# Patient Record
Sex: FEMALE | Race: WHITE | NOT HISPANIC OR LATINO | Employment: PART TIME | URBAN - NONMETROPOLITAN AREA
[De-identification: names, ages, dates, MRNs, and addresses within clinical notes are randomized per-mention and may not be internally consistent; named-entity substitution may affect disease eponyms.]

---

## 2017-01-02 ENCOUNTER — HISTORY (OUTPATIENT)
Dept: INTERNAL MEDICINE | Facility: OTHER | Age: 37
End: 2017-01-02

## 2017-01-02 ENCOUNTER — OFFICE VISIT - GICH (OUTPATIENT)
Dept: INTERNAL MEDICINE | Facility: OTHER | Age: 37
End: 2017-01-02

## 2017-01-02 DIAGNOSIS — H60.502 ACUTE NONINFECTIVE OTITIS EXTERNA OF LEFT EAR: ICD-10-CM

## 2017-01-02 ASSESSMENT — ANXIETY QUESTIONNAIRES
3. WORRYING TOO MUCH ABOUT DIFFERENT THINGS: SEVERAL DAYS
7. FEELING AFRAID AS IF SOMETHING AWFUL MIGHT HAPPEN: SEVERAL DAYS
5. BEING SO RESTLESS THAT IT IS HARD TO SIT STILL: NEARLY EVERY DAY
2. NOT BEING ABLE TO STOP OR CONTROL WORRYING: SEVERAL DAYS
6. BECOMING EASILY ANNOYED OR IRRITABLE: SEVERAL DAYS
GAD7 TOTAL SCORE: 9
1. FEELING NERVOUS, ANXIOUS, OR ON EDGE: SEVERAL DAYS
4. TROUBLE RELAXING: SEVERAL DAYS

## 2017-01-02 ASSESSMENT — PATIENT HEALTH QUESTIONNAIRE - PHQ9: SUM OF ALL RESPONSES TO PHQ QUESTIONS 1-9: 8

## 2017-01-09 ENCOUNTER — COMMUNICATION - GICH (OUTPATIENT)
Dept: PEDIATRICS | Facility: OTHER | Age: 37
End: 2017-01-09

## 2017-01-09 DIAGNOSIS — F41.1 GENERALIZED ANXIETY DISORDER: ICD-10-CM

## 2017-01-09 DIAGNOSIS — F33.2 MAJOR DEPRESSIVE DISORDER, RECURRENT SEVERE WITHOUT PSYCHOTIC FEATURES (H): ICD-10-CM

## 2017-01-25 ENCOUNTER — COMMUNICATION - GICH (OUTPATIENT)
Dept: INTERNAL MEDICINE | Facility: OTHER | Age: 37
End: 2017-01-25

## 2017-01-25 DIAGNOSIS — N76.0 ACUTE VAGINITIS: ICD-10-CM

## 2017-02-10 ENCOUNTER — COMMUNICATION - GICH (OUTPATIENT)
Dept: PEDIATRICS | Facility: OTHER | Age: 37
End: 2017-02-10

## 2017-02-10 DIAGNOSIS — F41.1 GENERALIZED ANXIETY DISORDER: ICD-10-CM

## 2017-02-10 DIAGNOSIS — F33.2 MAJOR DEPRESSIVE DISORDER, RECURRENT SEVERE WITHOUT PSYCHOTIC FEATURES (H): ICD-10-CM

## 2017-09-14 ENCOUNTER — AMBULATORY - GICH (OUTPATIENT)
Dept: PEDIATRICS | Facility: OTHER | Age: 37
End: 2017-09-14

## 2017-09-19 ENCOUNTER — AMBULATORY - GICH (OUTPATIENT)
Dept: INTERNAL MEDICINE | Facility: OTHER | Age: 37
End: 2017-09-19

## 2017-09-21 ENCOUNTER — AMBULATORY - GICH (OUTPATIENT)
Dept: INTERNAL MEDICINE | Facility: OTHER | Age: 37
End: 2017-09-21

## 2017-10-31 ENCOUNTER — AMBULATORY - GICH (OUTPATIENT)
Dept: INTERNAL MEDICINE | Facility: OTHER | Age: 37
End: 2017-10-31

## 2018-01-02 NOTE — PROGRESS NOTES
"Patient Information     Patient Name MRN Sex Hodan Aguirre [id number] Female 1980      Progress Notes by Leda Medina NP at 2017  9:30 AM     Author:  Leda Medina NP Service:  (none) Author Type:  PHYS- Nurse Practitioner     Filed:  2017  9:56 AM Encounter Date:  2017 Status:  Signed     :  Leda Medina NP (PHYS- Nurse Practitioner)            SUBJECTIVE:    Hodan Iniguez is a 36 y.o. female who presents for plugged, painful left ear.    HPI  she reports that for the past 2 days her left ear has been plugged and swollen. She has been afebrile. There has been no drainage from the ear. She denies URI symptoms. No history of allergies. She does take baths daily but does not swim regularly. She reports she has taken ibuprofen for discomfort.  No Known Allergies,   Current Outpatient Prescriptions on File Prior to Visit       Medication  Sig Dispense Refill     levothyroxine (SYNTHROID) 175 mcg tablet Take 1 tablet by mouth before breakfast. 90 tablet 4     sertraline (ZOLOFT) 100 mg tablet Take 1 tablet by mouth once daily. Note dose change 90 tablet 1     traZODone (DESYREL) 50 mg tablet Take 1 tablet by mouth at bedtime if needed for Sleep. 30 tablet 1     venlafaxine (EFFEXOR XR) 75 mg cp24 Extended-Release capsule 75 mg daily x 7 days then 150 mg daily 60 capsule 1     No current facility-administered medications on file prior to visit.     and   Past Medical History     Diagnosis  Date     Anxiety      GERD (gastroesophageal reflux disease)      Hypothyroidism        REVIEW OF SYSTEMS:  ROS  see history of present illness  OBJECTIVE:  /82  Temp 97.9  F (36.6  C) (Tympanic)  Ht 1.645 m (5' 4.75\")  Wt 97.1 kg (214 lb)  LMP 2016  Breastfeeding? No  BMI 35.89 kg/m2    EXAM:   Physical Exam  pleasant female without acute distress. Affect normal. Alert and oriented ×4. Left ear canal swollen and mildly erythematous. Unable to evaluate TM. Right " TM and canal clear. Oral mucosa pink and moist. Throat without erythema. Neck supple and without adenopathy.    ASSESSMENT/PLAN:    ICD-10-CM    1. Acute otitis externa of left ear, unspecified type H60.502 neomycin-polymyxin-hydrocortisone (CORTISPORIN OTIC) otic suspension        Plan:  Treat with Cortisporin otic 4 drops left ear 3 times daily for 1 week. If symptoms not clear in the next week or if develops worsening then will need follow-up. Explained to patient that because of the swelling of the ear canal was not able to visualize the tympanic membrane. Suspect she does not have left otitis media but if symptoms are not improving she will need to have her ear rechecked.

## 2018-01-02 NOTE — NURSING NOTE
Patient Information     Patient Name MRN Sex Hodan Aguirre [id number] Female 1980      Nursing Note by Eri Nagy LPN at 2017  9:30 AM     Author:  Eri Nagy LPN Service:  (none) Author Type:  NURS- Licensed Practical Nurse     Filed:  2017  9:48 AM Encounter Date:  2017 Status:  Signed     :  Eri Nagy LPN (NURS- Licensed Practical Nurse)            Hodan Iniguez is a 36 y.o. female here today for left ear pain that has been ongoing for the past couple days.  Eri Nagy LPN.........2017   9:29 AM

## 2018-01-03 NOTE — TELEPHONE ENCOUNTER
Patient Information     Patient Name MRN Hodan Del Valle [id number] Female 1980      Telephone Encounter by Eri Nagy LPN at 2/10/2017 12:38 PM     Author:  Eri Nagy LPN Service:  (none) Author Type:  NURS- Licensed Practical Nurse     Filed:  2/10/2017 12:38 PM Encounter Date:  2/10/2017 Status:  Signed     :  Eri Nagy LPN (NURS- Licensed Practical Nurse)            Left message for patient to return call.  Eri Nagy LPN.........2/10/2017   12:38 PM

## 2018-01-03 NOTE — TELEPHONE ENCOUNTER
Patient Information     Patient Name MRN Hodan Del Valle [id number] Female 1980      Telephone Encounter by Leda Medina NP at 2017 12:33 PM     Author:  Leda Medina NP Service:  (none) Author Type:  PHYS- Nurse Practitioner     Filed:  2017 12:34 PM Encounter Date:  2017 Status:  Signed     :  Leda Medina NP (PHYS- Nurse Practitioner)            rx sent.  Let her know to be seen if doesn't get better with treatment

## 2018-01-03 NOTE — TELEPHONE ENCOUNTER
Patient Information     Patient Name MRN Hodan Del Valle [id number] Female 1980      Telephone Encounter by Nuvia Arrieta RN at 2/10/2017  8:39 AM     Author:  Nuvia Arrieta RN  Service:  (none) Author Type:  NURS- Registered Nurse     Filed:  2/10/2017  8:59 AM  Encounter Date:  2/10/2017 Status:  Addendum     :  Nuvia Arrieta RN (NURS- Registered Nurse)        Related Notes: Original Note by Nuvia Arrieta RN (NURS- Registered Nurse) filed at 2/10/2017  8:44 AM            venlafaxine (EFFEXOR XR) 75 mg cp24 Extended-Release capsule  TAKE 1 CAPSULE BY MOUTH ONCE DAILY FOR 7 DAYS THEN 2 CAPSULES(150 MG) BY MOUTH DAILY.  Disp: 60 capsule Refills: 0    Class: eRx Start: 2/10/2017    For: Severe episode of recurrent major depressive disorder, without psychotic features (HC), JUAN CARLOS (generalized anxiety disorder)  Originally ordered: 2 months ago by Sudheer Marquez MD  Last refill: 2017  To be filled at: [facility] [address] [place] [address] AT [address] - [phone]: [phone]    Last visit with SUDHEER MARQUEZ was on: 2016 in [facility]  PCP:  Leda Medina NP    New medication started on 16 and patient was to follow up in 4 weeks and no follow up on this noted.    Left message for patient to return call to schedule follow up.    Patient returned call and states she only has one day left of her medication and that the 2 capsules a day are working good for her.  Would you want to work patient in today for follow up or refill until she can get in another day.  Patients PCP in [place] and patient wants to keep her as her PCP .    Unable to complete prescription refill per RN Medication Refill Policy.................... NUVIA ARRIETA RN ....................  2/10/2017   8:40 AM

## 2018-01-03 NOTE — TELEPHONE ENCOUNTER
Patient Information     Patient Name MRN Sex Hodan Aguirre [id number] Female 1980      Telephone Encounter by Trudy Morin at 2/10/2017  9:25 AM     Author:  Trudy Morin Service:  (none) Author Type:  (none)     Filed:  2/10/2017  9:25 AM Encounter Date:  2/10/2017 Status:  Signed     :  Trudy Morin            Left message to call back.  Trudy Morin [facility] ([facility]) ....................  2/10/2017   9:25 AM

## 2018-01-03 NOTE — TELEPHONE ENCOUNTER
Patient Information     Patient Name MRN Hodan Del Valle [id number] Female 1980      Telephone Encounter by Abraham Draper RN at 2017  2:25 PM     Author:  Abraham Draper RN Service:  (none) Author Type:  NURS- Registered Nurse     Filed:  2017  2:35 PM Encounter Date:  2017 Status:  Signed     :  Abraham Draper RN (NURS- Registered Nurse)            Medication is not on refill protocol or medication list.  Unable to complete prescription refill per RN Medication Refill Policy.................... ABRAHAM DRAPER RN ....................  2017   2:25 PM  Call place to patient.  Patient states she thinks she has a yeast infection.  She is having slimy clear discharge, pain on the outside of her opening, and is chaffed.  Denies fevers, or burning with urination.  Denies abdominal pain.      This is a Refill request from: [facility]  Name of Medication: Diflucan 150mg tablet  Quantity requested: 1 tab  Last fill date: 16  Last visit with LEDA ESPINAL was on: 2017 in [facility]  PCP:  Leda Espinal NP  Controlled Substance Agreement:  na   Diagnosis r/t this medication request: unsure

## 2018-01-03 NOTE — TELEPHONE ENCOUNTER
Patient Information     Patient Name MRN Sex Hodan Aguirre [id number] Female 1980      Telephone Encounter by Trudy Morin at 2017 12:43 PM     Author:  Trudy Morin Service:  (none) Author Type:  (none)     Filed:  2017 12:43 PM Encounter Date:  2017 Status:  Signed     :  Trudy Morin            Left message to call back.   Trudy Morin [facility] ([facility]) ....................  2017   12:43 PM

## 2018-01-03 NOTE — TELEPHONE ENCOUNTER
Patient Information     Patient Name MRN Hodan Del Valle [id number] Female 1980      Telephone Encounter by Eri Nagy LPN at 2017  8:58 AM     Author:  Eri Nagy LPN Service:  (none) Author Type:  NURS- Licensed Practical Nurse     Filed:  2017  8:58 AM Encounter Date:  2/10/2017 Status:  Signed     :  Eri Nagy LPN (NURS- Licensed Practical Nurse)            Left message for patient to return call.  Eri Nagy LPN.........2017   8:58 AM

## 2018-01-03 NOTE — TELEPHONE ENCOUNTER
Patient Information     Patient Name MRN Sex Hodan Aguirre [id number] Female 1980      Telephone Encounter by Gosselin, Norma J at 2017  2:25 PM     Author:  Gosselin, Norma J Service:  (none) Author Type:  (none)     Filed:  2017  2:25 PM Encounter Date:  2017 Status:  Signed     :  Gosselin, Norma J            After patient's name and date of birth verified, patient given the below information.  Norma J Gosselin LPN....................  2017   2:25 PM

## 2018-01-08 ENCOUNTER — COMMUNICATION - GICH (OUTPATIENT)
Dept: INTERNAL MEDICINE | Facility: OTHER | Age: 38
End: 2018-01-08

## 2018-01-08 DIAGNOSIS — F33.2 MAJOR DEPRESSIVE DISORDER, RECURRENT SEVERE WITHOUT PSYCHOTIC FEATURES (H): ICD-10-CM

## 2018-01-08 DIAGNOSIS — F41.1 GENERALIZED ANXIETY DISORDER: ICD-10-CM

## 2018-01-26 VITALS
DIASTOLIC BLOOD PRESSURE: 82 MMHG | WEIGHT: 214 LBS | BODY MASS INDEX: 35.65 KG/M2 | HEIGHT: 65 IN | TEMPERATURE: 97.9 F | SYSTOLIC BLOOD PRESSURE: 120 MMHG

## 2018-02-01 ASSESSMENT — ANXIETY QUESTIONNAIRES: GAD7 TOTAL SCORE: 9

## 2018-02-01 ASSESSMENT — PATIENT HEALTH QUESTIONNAIRE - PHQ9: SUM OF ALL RESPONSES TO PHQ QUESTIONS 1-9: 8

## 2018-02-12 NOTE — TELEPHONE ENCOUNTER
Patient Information     Patient Name MRN Hodan Del Valle [id number] Female 1980      Telephone Encounter by Zina Pryor RN at 1/10/2018 10:34 AM     Author:  Zina Pryor RN Service:  (none) Author Type:  NURS- Registered Nurse     Filed:  1/10/2018 10:39 AM Encounter Date:  2018 Status:  Signed     :  Zina Pryor RN (NURS- Registered Nurse)            Depression-in adults 18 and over  Serotonin/Norepinephrine Reuptake Inhibitors    Office visit in the past 12 months or as indicated in chart.  Should have clinic visit 1-2 months after initial prescription.    Last visit with SAM ESPINAL was on: 2017 in [facility]  Next visit with SAM ESPINAL is on: No future appointment listed with this provider  Next visit with Internal Medicine is on: No future appointment listed in this department    Max refills 12 months from last office visit or per providers notes. Letter sent to remind patient she is due for annual med management appointment. 90 day supply given at this time.   Prescription refilled per RN Medication Refill Policy.................... Zina Pryor RN ....................  1/10/2018   10:35 AM

## 2018-02-16 ENCOUNTER — DOCUMENTATION ONLY (OUTPATIENT)
Dept: FAMILY MEDICINE | Facility: OTHER | Age: 38
End: 2018-02-16

## 2018-02-16 PROBLEM — K21.9 ESOPHAGEAL REFLUX: Status: ACTIVE | Noted: 2018-02-16

## 2018-02-16 PROBLEM — N76.0 ACUTE VAGINITIS: Status: ACTIVE | Noted: 2017-01-27

## 2018-02-16 RX ORDER — SERTRALINE HYDROCHLORIDE 100 MG/1
100 TABLET, FILM COATED ORAL DAILY
COMMUNITY
Start: 2016-03-31 | End: 2018-06-19

## 2018-02-16 RX ORDER — LEVOTHYROXINE SODIUM 175 UG/1
175 TABLET ORAL
COMMUNITY
Start: 2016-12-13 | End: 2018-02-22

## 2018-02-16 RX ORDER — VENLAFAXINE HYDROCHLORIDE 150 MG/1
1 CAPSULE, EXTENDED RELEASE ORAL
COMMUNITY
Start: 2018-01-10 | End: 2018-04-30

## 2018-02-16 RX ORDER — TRAZODONE HYDROCHLORIDE 50 MG/1
50 TABLET, FILM COATED ORAL
COMMUNITY
Start: 2016-12-12 | End: 2018-06-19

## 2018-02-22 DIAGNOSIS — E03.9 ACQUIRED HYPOTHYROIDISM: Primary | ICD-10-CM

## 2018-02-22 NOTE — LETTER
February 27, 2018    Hodan Iniguez  [address]  [place] [address]    Dear Miss Iniguez,      A refill of LEVOTHYROXINE has been requested by your pharmacy and we noticed that you are overdue for an annual exam and labs.  Your last comprehensive visit with Sudheer Marquez MD was on 12/12/2016.    This refill request has been sent to your provider for consideration at this time.    Your health is very important to us.  Please call the clinic at [phone] to schedule your appointment.    If you are no longer seeing Leda Medina NP for primary care, please call to let us know. Doing so will remove you from our call/contact list.    Thank you for choosing [facility] for your health care needs.    Sincerely,    Reftad BECERRA  [facility]

## 2018-02-27 RX ORDER — LEVOTHYROXINE SODIUM 175 UG/1
TABLET ORAL
Qty: 90 TABLET | Refills: 3 | Status: SHIPPED | OUTPATIENT
Start: 2018-02-27 | End: 2018-06-20

## 2018-02-27 NOTE — TELEPHONE ENCOUNTER
"Refill requested from [facility] for:  levothyroxine (SYNTHROID/LEVOTHROID) 175 MCG tablet.    LOV with prescribing- Sudheer Marquez MD:  12/12/2016  Patient was to follow-up in 4 weeks.  Was seen on 1/2/2017 by Leda Medina NP for ear problem.  LOV was with Leda Medina, NP:  1/2/2017  Last TSH 12/12/2016.    No upcoming appointments noted at this time.      Attempted to contact patient-no answer and no message left at this time.      Letter has been sent and medication teed up for 90 days for consideration.    Medication:levothyroxine (SYNTHROID) 175 mcg tablet    Qty:90 tablet   Ref:4  Start:12/13/2016  End:              Route:Oral                  NARESH:No   Class:eRx    Sig:Take 1 tablet by mouth before breakfast.    Note to Pharmacy:Dose increase    [facility] [address] [place] [address] [address]  AT [address]              OF [facility] [address] [facility] [phone]    Requested Prescriptions   Pending Prescriptions Disp Refills     levothyroxine (SYNTHROID/LEVOTHROID) 175 MCG tablet [Pharmacy Med Name: LEVOTHYROXINE 0.175MG (175MCG)TABS] 90 tablet 0     Sig: TAKE 1 TABLET BY MOUTH BEFORE BREAKFAST    Thyroid Protocol Failed    2/27/2018 10:18 AM       Failed - Recent or future visit with authorizing provider's specialty    Patient had office visit in the last year or has a visit in the next 30 days with authorizing provider.  See \"Patient Info\" tab in inbasket, or \"Choose Columns\" in Meds & Orders section of the refill encounter.            Failed - Normal TSH on file in past 12 months    No lab results found.          Failed - No positive pregnancy test in past 12 months    If patient is pregnant or has had a positive pregnancy test, please check TSH.         Passed - Patient is 12 years or older       Passed - No active pregnancy on record    If patient is pregnant or has had a positive pregnancy test, please check TSH.          Nicolle Oscar RN  " ....................  2/27/2018   10:56 AM

## 2018-02-27 NOTE — TELEPHONE ENCOUNTER
TSH lab ordered/signed for future.    Nicolle Oscar RN  ....................  2/27/2018   11:14 AM

## 2018-04-30 DIAGNOSIS — F41.1 GAD (GENERALIZED ANXIETY DISORDER): ICD-10-CM

## 2018-04-30 DIAGNOSIS — F33.9 RECURRENT MAJOR DEPRESSIVE DISORDER, REMISSION STATUS UNSPECIFIED (H): Primary | ICD-10-CM

## 2018-05-02 RX ORDER — VENLAFAXINE HYDROCHLORIDE 150 MG/1
CAPSULE, EXTENDED RELEASE ORAL
Qty: 90 CAPSULE | Refills: 3 | Status: SHIPPED | OUTPATIENT
Start: 2018-05-02 | End: 2018-06-19

## 2018-06-19 ENCOUNTER — OFFICE VISIT (OUTPATIENT)
Dept: INTERNAL MEDICINE | Facility: OTHER | Age: 38
End: 2018-06-19
Attending: NURSE PRACTITIONER
Payer: COMMERCIAL

## 2018-06-19 ENCOUNTER — MYC MEDICAL ADVICE (OUTPATIENT)
Dept: INTERNAL MEDICINE | Facility: OTHER | Age: 38
End: 2018-06-19

## 2018-06-19 VITALS
DIASTOLIC BLOOD PRESSURE: 82 MMHG | HEART RATE: 88 BPM | BODY MASS INDEX: 34.42 KG/M2 | TEMPERATURE: 97.4 F | WEIGHT: 206.6 LBS | SYSTOLIC BLOOD PRESSURE: 128 MMHG | HEIGHT: 65 IN

## 2018-06-19 DIAGNOSIS — N97.9 INABILITY TO CONCEIVE, FEMALE: ICD-10-CM

## 2018-06-19 DIAGNOSIS — Z83.3 FAMILY HISTORY OF DIABETES MELLITUS: ICD-10-CM

## 2018-06-19 DIAGNOSIS — E03.9 HYPOTHYROIDISM, UNSPECIFIED TYPE: Primary | ICD-10-CM

## 2018-06-19 DIAGNOSIS — F33.9 RECURRENT MAJOR DEPRESSIVE DISORDER, REMISSION STATUS UNSPECIFIED (H): ICD-10-CM

## 2018-06-19 PROBLEM — N76.0 ACUTE VAGINITIS: Status: RESOLVED | Noted: 2017-01-27 | Resolved: 2018-06-19

## 2018-06-19 PROBLEM — K21.9 ESOPHAGEAL REFLUX: Status: RESOLVED | Noted: 2018-02-16 | Resolved: 2018-06-19

## 2018-06-19 LAB
GLUCOSE SERPL-MCNC: 95 MG/DL (ref 70–105)
T4 FREE SERPL-MCNC: 1.37 NG/DL (ref 0.6–1.6)
TSH SERPL DL<=0.05 MIU/L-ACNC: <0.2 IU/ML (ref 0.34–5.6)

## 2018-06-19 PROCEDURE — 99215 OFFICE O/P EST HI 40 MIN: CPT | Performed by: NURSE PRACTITIONER

## 2018-06-19 PROCEDURE — 84443 ASSAY THYROID STIM HORMONE: CPT | Performed by: NURSE PRACTITIONER

## 2018-06-19 PROCEDURE — 84439 ASSAY OF FREE THYROXINE: CPT | Performed by: NURSE PRACTITIONER

## 2018-06-19 PROCEDURE — 82947 ASSAY GLUCOSE BLOOD QUANT: CPT | Performed by: NURSE PRACTITIONER

## 2018-06-19 PROCEDURE — 36415 COLL VENOUS BLD VENIPUNCTURE: CPT | Performed by: NURSE PRACTITIONER

## 2018-06-19 RX ORDER — VENLAFAXINE HYDROCHLORIDE 75 MG/1
225 CAPSULE, EXTENDED RELEASE ORAL DAILY
Qty: 270 CAPSULE | Refills: 1 | Status: SHIPPED | OUTPATIENT
Start: 2018-06-19 | End: 2018-07-05

## 2018-06-19 ASSESSMENT — ANXIETY QUESTIONNAIRES
2. NOT BEING ABLE TO STOP OR CONTROL WORRYING: MORE THAN HALF THE DAYS
6. BECOMING EASILY ANNOYED OR IRRITABLE: MORE THAN HALF THE DAYS
7. FEELING AFRAID AS IF SOMETHING AWFUL MIGHT HAPPEN: SEVERAL DAYS
5. BEING SO RESTLESS THAT IT IS HARD TO SIT STILL: SEVERAL DAYS
IF YOU CHECKED OFF ANY PROBLEMS ON THIS QUESTIONNAIRE, HOW DIFFICULT HAVE THESE PROBLEMS MADE IT FOR YOU TO DO YOUR WORK, TAKE CARE OF THINGS AT HOME, OR GET ALONG WITH OTHER PEOPLE: SOMEWHAT DIFFICULT
1. FEELING NERVOUS, ANXIOUS, OR ON EDGE: MORE THAN HALF THE DAYS
GAD7 TOTAL SCORE: 11
3. WORRYING TOO MUCH ABOUT DIFFERENT THINGS: MORE THAN HALF THE DAYS

## 2018-06-19 ASSESSMENT — ENCOUNTER SYMPTOMS
CHILLS: 0
NERVOUS/ANXIOUS: 0
SORE THROAT: 0
FREQUENCY: 0
HEARTBURN: 0
DYSPHORIC MOOD: 1
AGITATION: 0
DIARRHEA: 0
FEVER: 0
COUGH: 0
EYE DISCHARGE: 0
POLYDIPSIA: 0
ABDOMINAL PAIN: 0
UNEXPECTED WEIGHT CHANGE: 0
HALLUCINATIONS: 0
SHORTNESS OF BREATH: 0
WEAKNESS: 0
WOUND: 0
NAUSEA: 0
PALPITATIONS: 0
VOMITING: 0
DYSURIA: 0
PARESTHESIAS: 0
CONSTIPATION: 0
ADENOPATHY: 0
JOINT SWELLING: 0
SLEEP DISTURBANCE: 0
HEMATOCHEZIA: 0
NUMBNESS: 0
DIZZINESS: 0
CONFUSION: 0
MYALGIAS: 0
POLYPHAGIA: 0
WHEEZING: 0
EYE REDNESS: 0
ARTHRALGIAS: 0
HEADACHES: 0

## 2018-06-19 ASSESSMENT — PAIN SCALES - GENERAL: PAINLEVEL: NO PAIN (0)

## 2018-06-19 ASSESSMENT — PATIENT HEALTH QUESTIONNAIRE - PHQ9: 5. POOR APPETITE OR OVEREATING: SEVERAL DAYS

## 2018-06-19 NOTE — PATIENT INSTRUCTIONS
Schedule appointment with Mireya Johnson or other mental health practitioner.  You should also see a mental health counselor.  Call 911 or First call for help with any suicidal ideation.  You will be referred to gynecology for infertility.  I would like you to be stable with mental health issues before conceiving.  Increase the Effexor XR to 225 mg daily and discuss with gynecology and mental health counselor safest medication for depression if you are to get pregnant.  You will need a pap which can be done at the gynecology appointment.  We will check labs for thyroid and glucose today and will adjust medication as needed.

## 2018-06-19 NOTE — MR AVS SNAPSHOT
After Visit Summary   6/19/2018    Hodan Iniguez    MRN: [id number]           Patient Information     Date Of Birth          1980        Visit Information        Provider Department      6/19/2018 9:00 AM Leda Medina NP [facility]        Today's Diagnoses     Hypothyroidism, unspecified type    -  1    Recurrent major depressive disorder, remission status unspecified (H)        Family history of diabetes mellitus        Inability to conceive, female          Care Instructions    Schedule appointment with Mireya Johnson or other mental health practitioner.  You should also see a mental health counselor.  Call 911 or First call for help with any suicidal ideation.  You will be referred to gynecology for infertility.  I would like you to be stable with mental health issues before conceiving.  Increase the Effexor XR to 225 mg daily and discuss with gynecology and mental health counselor safest medication for depression if you are to get pregnant.  You will need a pap which can be done at the gynecology appointment.  We will check labs for thyroid and glucose today and will adjust medication as needed.            Follow-ups after your visit        Additional Services     OB/GYN REFERRAL       Your provider has referred you to:  [facility]: [facility] [phone]   [email or web address]    Please be aware that coverage of these services is subject to the terms and limitations of your health insurance plan.  Call member services at your health plan with any benefit or coverage questions.      Please bring the following with you to your appointment:    (1) Any X-Rays, CTs or MRIs which have been performed.  Contact the facility where they were done to arrange for  prior to your scheduled appointment.   (2) List of current medications   (3) This referral request   (4) Any documents/labs given to you for this referral       "            Who to contact     If you have questions or need follow up information about today's clinic visit or your schedule please contact [facility] directly at [phone].  Normal or non-critical lab and imaging results will be communicated to you by [employer], letter or phone within 4 business days after the clinic has received the results. If you do not hear from us within 7 days, please contact the clinic through [employer] or phone. If you have a critical or abnormal lab result, we will notify you by phone as soon as possible.  Submit refill requests through [employer] or call your pharmacy and they will forward the refill request to us. Please allow 3 business days for your refill to be completed.          Additional Information About Your Visit        [employer] Information     [employer] gives you secure access to your electronic health record. If you see a primary care provider, you can also send messages to your care team and make appointments. If you have questions, please call your primary care clinic.  If you do not have a primary care provider, please call [phone] and they will assist you.        Care EveryWhere ID     This is your Care EveryWhere ID. This could be used by other organizations to access your [facility] medical records  [id number]        Your Vitals Were     Pulse Temperature Height Last Period Breastfeeding? BMI (Body Mass Index)    88 97.4  F (36.3  C) (Tympanic) 1.651 m (5' 5\") 05/18/2018 No 34.38 kg/m2       Blood Pressure from Last 3 Encounters:   06/19/18 128/82   01/02/17 120/82   12/12/16 130/86    Weight from Last 3 Encounters:   06/19/18 93.7 kg (206 lb 9.6 oz)   01/02/17 97.1 kg (214 lb)   12/12/16 98.6 kg (217 lb 5 oz)              We Performed the Following     Glucose     OB/GYN REFERRAL     T4 free     Thyrotropin GH          Today's Medication Changes          These changes are accurate as of 6/19/18  9:47 AM.  If you have any questions, ask your nurse " or doctor.               These medicines have changed or have updated prescriptions.        Dose/Directions    venlafaxine 75 MG 24 hr capsule   Commonly known as:  EFFEXOR-XR   This may have changed:    - medication strength  - See the new instructions.   Used for:  Recurrent major depressive disorder, remission status unspecified (H)   Changed by:  Leda Medina NP        Dose:  225 mg   Take 3 capsules (225 mg) by mouth daily   Quantity:  270 capsule   Refills:  1            Where to get your medicines      These medications were sent to [id number] - [place], [place] - [address]  [address], [facility] [address]     Phone:  [phone]     venlafaxine 75 MG 24 hr capsule                Primary Care Provider Office Phone # Fax #    Leda Medina [phone]       [address]  [facility] [address]        Equal Access to Services     [facility]: Hadii gerry ku hadasho Soomaali, waaxda luqadaha, qaybta kaalmada adeegyada, bryce stevens . So [facility] [phone].    ATENCIÓN: Si masonla espdave, tiene a pedersen disposición servicios gratuitos de asistencia lingüística. [facility] [phone].    We comply with applicable federal civil rights laws and [place] laws. We do not discriminate on the basis of race, color, national origin, age, disability, sex, sexual orientation, or gender identity.            Thank you!     Thank you for choosing [facility]  for your care. Our goal is always to provide you with excellent care. Hearing back from our patients is one way we can continue to improve our services. Please take a few minutes to complete the written survey that you may receive in the mail after your visit with us. Thank you!             Your Updated Medication List - Protect others around you: Learn how to safely use, store and throw away your medicines at  www.disposemymeds.org.          This list is accurate as of 6/19/18  9:47 AM.  Always use your most recent med list.                   Brand Name Dispense Instructions for use Diagnosis    levothyroxine 175 MCG tablet    SYNTHROID/LEVOTHROID    90 tablet    TAKE 1 TABLET BY MOUTH BEFORE BREAKFAST    Acquired hypothyroidism       venlafaxine 75 MG 24 hr capsule    EFFEXOR-XR    270 capsule    Take 3 capsules (225 mg) by mouth daily    Recurrent major depressive disorder, remission status unspecified (H)

## 2018-06-19 NOTE — PROGRESS NOTES
Subjective:  She is here today to discuss several concerns.  Her primary concern is that she has not been able to get pregnant.  She has used no contraception in the past 5 years.  She has had more than 1 sex partner in 5 years.  Her most current partner has been with her for the past 2 years.  He has been told that he has a low sperm count.  He does have some paralysis issues.  She states that not being able to conceive does increase her stress and depression.  She feels that she needs a higher dose of medication to treat her depression.  Over the past 3 weeks she has felt more depressed and stressed.  She has had fleeting suicidal thoughts but has no plan to proceed.  She states when she feels this way normally she will talk with her significant other or mother.  She has a good family support system.  She has not seen a mental health practitioner nor mental health counselor in the past.  She also has known hypothyroidism.  Last TSH was in November and was elevated.  She has been taking Synthroid daily.  She is due to have this rechecked.  She also has a family history of diabetes.  She is overweight but denies changes in her weight, polyphagia, polydipsia and polyuria.  She is also in need of Pap smear and general GYN exam.  She is not on any contraception.  Menses is regular.    Patient Active Problem List   Diagnosis     Hypothyroidism     Major depression, recurrent (H)     Past Medical History:   Diagnosis Date     Anxiety disorder     No Comments Provided     Gastro-esophageal reflux disease without esophagitis     No Comments Provided     Hypothyroidism     No Comments Provided     Past Surgical History:   Procedure Laterality Date     CHOLECYSTECTOMY      No Comments Provided     Social History     Social History     Marital status: Single     Spouse name: N/A     Number of children: N/A     Years of education: N/A     Occupational History     Not on file.     Social History Main Topics     Smoking status:  Never Smoker     Smokeless tobacco: Never Used     Alcohol use 0.0 oz/week      Comment: Alcoholic Drinks/day: social     Drug use: No     Sexual activity: Yes     Partners: Male     Other Topics Concern     Not on file     Social History Narrative    Lives with parents.  Works at [employer]     Family History   Problem Relation Age of Onset     Prostate Cancer Father      Cancer-prostate     Hypertension Father      Hypertension     Hypertension Mother      Hypertension     Mental Illness Mother      Mental illness,sees a therapist     Other - See Comments Sister      infertility     Other - See Comments Sister      infertility     Mental Illness Brother      Mental illness,anger, therapy     Cancer Paternal Grandmother 80     Cancer,lung cancer smoker     Thyroid Disease Sister      Thyroid Disease,hypothyroid     Current Outpatient Prescriptions   Medication Sig Dispense Refill     levothyroxine (SYNTHROID/LEVOTHROID) 175 MCG tablet TAKE 1 TABLET BY MOUTH BEFORE BREAKFAST 90 tablet 3     venlafaxine (EFFEXOR-XR) 75 MG 24 hr capsule Take 3 capsules (225 mg) by mouth daily 270 capsule 1     [DISCONTINUED] venlafaxine (EFFEXOR-XR) 150 MG 24 hr capsule TAKE 1 CAPSULE BY MOUTH EVERY DAY WITH A MEAL 90 capsule 3     Review of patient's allergies indicates no known allergies.      Review of Systems:  Review of Systems   Constitutional: Negative for chills, fever and unexpected weight change.   HENT: Negative for congestion, ear pain and sore throat.    Eyes: Negative for discharge and redness.   Respiratory: Negative for cough, shortness of breath and wheezing.    Cardiovascular: Negative for chest pain, palpitations and peripheral edema.   Gastrointestinal: Negative for abdominal pain, constipation, diarrhea, heartburn, hematochezia, nausea and vomiting.   Endocrine: Negative for cold intolerance, heat intolerance, polydipsia, polyphagia and polyuria.   Genitourinary: Negative for dysuria, frequency, menstrual problem,  "pelvic pain and vaginal discharge.   Musculoskeletal: Negative for arthralgias, joint swelling and myalgias.   Skin: Negative for pallor, rash and wound.   Allergic/Immunologic: Negative for immunocompromised state.   Neurological: Negative for dizziness, weakness, numbness, headaches and paresthesias.   Hematological: Negative for adenopathy.   Psychiatric/Behavioral: Positive for dysphoric mood, mood changes and suicidal ideas. Negative for agitation, confusion, hallucinations, self-injury and sleep disturbance. The patient is not nervous/anxious.        Objective:   /82 (BP Location: Right arm, Patient Position: Chair, Cuff Size: Adult Large)  Pulse 88  Temp 97.4  F (36.3  C) (Tympanic)  Ht 5' 5\" (1.651 m)  Wt 206 lb 9.6 oz (93.7 kg)  LMP 05/18/2018  Breastfeeding? No  BMI 34.38 kg/m2  Physical Exam   Constitutional: She is oriented to person, place, and time. She appears well-developed. No distress.   Overweight   HENT:   Mouth/Throat: Oropharynx is clear and moist. No oropharyngeal exudate.   TMs clear bilaterally.  Throat without erythema.  She has several piercings in her ear, face and also chest   Eyes: Conjunctivae are normal. Right eye exhibits no discharge. Left eye exhibits no discharge. No scleral icterus.   Neck: Neck supple. No thyromegaly present.   Cardiovascular: Normal rate, regular rhythm, normal heart sounds and intact distal pulses.  Exam reveals no gallop.    No murmur heard.  Pulmonary/Chest: Effort normal and breath sounds normal. She has no wheezes. She has no rales.   Abdominal: Soft. She exhibits no distension and no mass. There is no tenderness.   No hepatosplenomegaly   Musculoskeletal: She exhibits no edema or tenderness.   Lymphadenopathy:     She has no cervical adenopathy.   Neurological: She is alert and oriented to person, place, and time. She exhibits normal muscle tone. Coordination normal.   Skin: Skin is warm. No rash noted. She is not diaphoretic. No pallor. "   Psychiatric: She has a normal mood and affect. Her behavior is normal. Judgment and thought content normal.   Nursing note and vitals reviewed.    Previous labs reviewed and discussed with patient  Last Pap 2015 normal  Assessment:    ICD-10-CM    1. Hypothyroidism, unspecified type E03.9 Thyrotropin GH     T4 free   2. Recurrent major depressive disorder, remission status unspecified (H) F33.9 venlafaxine (EFFEXOR-XR) 75 MG 24 hr capsule   3. Family history of diabetes mellitus Z83.3 Glucose   4. Inability to conceive, female N97.9 OB/GYN REFERRAL       Plan:   1.  Will check TSH and free T4.  Continue with same Synthroid dose and adjustment will be made depending upon results from today.  2.  Patient has chronic depression.  Has worsened over the past few weeks.  Infertility has been a stressor.  Had a long discussion with patient about the amount of stress that he can increase in her life if she was to become pregnant and have a child.  Explained to her that I firmly believe that she should have her depression well controlled before she proceeds with conceiving.  Increase Effexor XR to 225 mg daily.  If she is planning to conceive then she needs to discuss this with gynecologist and a mental health practitioner to make sure this is a safe medication in pregnancy.  She has fleeting suicidal thoughts but no plan.  She believes that she never would do anything to hurt herself but sometimes she thinks it would make life easier.  She has called 911 before and will do that again if needed.  She also has a number for first call for help.  I recommend that she schedule an appointment with a mental health practitioner and also mental health counselor to help manage her depression.  Also discussed with her different coping mechanisms that can help to get her through stressful situations.  3.  She has family history of diabetes.  Will check nonfasting glucose level today.  Recommend weight loss through diet and recommend  daily exercise.  4.  She is referred to gynecology to evaluate fertility issues.  Patient is also in need of Pap but she would like to wait until her gynecology appointment.    40 minutes of face-to-face time spent with patient with greater than 50% in care coordination and counseling  TIARA Brewer   6/19/2018  9:33 AM

## 2018-06-19 NOTE — NURSING NOTE
Patient is here for discuss multiple issues. Did not want any medications refilled today when asked.   Dinora Ashley LPN...................6/19/2018   9:09 AM

## 2018-06-20 RX ORDER — LEVOTHYROXINE SODIUM 150 UG/1
150 TABLET ORAL DAILY
Qty: 90 TABLET | Refills: 1 | Status: SHIPPED | OUTPATIENT
Start: 2018-06-20 | End: 2018-12-19

## 2018-06-20 ASSESSMENT — PATIENT HEALTH QUESTIONNAIRE - PHQ9: SUM OF ALL RESPONSES TO PHQ QUESTIONS 1-9: 13

## 2018-06-20 ASSESSMENT — ANXIETY QUESTIONNAIRES: GAD7 TOTAL SCORE: 11

## 2018-07-05 ENCOUNTER — MYC MEDICAL ADVICE (OUTPATIENT)
Dept: INTERNAL MEDICINE | Facility: OTHER | Age: 38
End: 2018-07-05

## 2018-07-05 ENCOUNTER — OFFICE VISIT (OUTPATIENT)
Dept: OBGYN | Facility: OTHER | Age: 38
End: 2018-07-05
Attending: NURSE PRACTITIONER
Payer: COMMERCIAL

## 2018-07-05 VITALS
HEART RATE: 100 BPM | SYSTOLIC BLOOD PRESSURE: 122 MMHG | WEIGHT: 203.3 LBS | BODY MASS INDEX: 33.83 KG/M2 | DIASTOLIC BLOOD PRESSURE: 90 MMHG

## 2018-07-05 DIAGNOSIS — N97.9 INABILITY TO CONCEIVE, FEMALE: ICD-10-CM

## 2018-07-05 PROCEDURE — G0463 HOSPITAL OUTPT CLINIC VISIT: HCPCS

## 2018-07-05 PROCEDURE — 99202 OFFICE O/P NEW SF 15 MIN: CPT | Performed by: OBSTETRICS & GYNECOLOGY

## 2018-07-05 ASSESSMENT — PAIN SCALES - GENERAL: PAINLEVEL: NO PAIN (0)

## 2018-07-05 ASSESSMENT — ANXIETY QUESTIONNAIRES
2. NOT BEING ABLE TO STOP OR CONTROL WORRYING: NOT AT ALL
1. FEELING NERVOUS, ANXIOUS, OR ON EDGE: NOT AT ALL

## 2018-07-05 NOTE — MR AVS SNAPSHOT
After Visit Summary   7/5/2018    Hodan Iniguez    MRN: [id number]           Patient Information     Date Of Birth          1980        Visit Information        Provider Department      7/5/2018 3:00 PM Wang Skinner MD [facility]        Today's Diagnoses     Inability to conceive, female          Care Instructions    Try mid-cycle luteinizing hormone ovulation prediction testing.  Consider doing a hysterosalpingogram (HSG) for tubal patency.  Consider having your partner retested to check for sperm count.          Follow-ups after your visit        Future tests that were ordered for you today     Open Future Orders        Priority Expected Expires Ordered    XR Hysterosalpingogram Routine 7/25/2018 7/5/2019 7/5/2018            Who to contact     If you have questions or need follow up information about today's clinic visit or your schedule please contact [facility] directly at [phone].  Normal or non-critical lab and imaging results will be communicated to you by [employer], letter or phone within 4 business days after the clinic has received the results. If you do not hear from us within 7 days, please contact the clinic through [employer] or phone. If you have a critical or abnormal lab result, we will notify you by phone as soon as possible.  Submit refill requests through [employer] or call your pharmacy and they will forward the refill request to us. Please allow 3 business days for your refill to be completed.          Additional Information About Your Visit        MyChart Information     [employer] gives you secure access to your electronic health record. If you see a primary care provider, you can also send messages to your care team and make appointments. If you have questions, please call your primary care clinic.  If you do not have a primary care provider, please call [phone] and they will assist you.        Care EveryWhere ID     This is  your Care EveryWhere ID. This could be used by other organizations to access your [place] medical records  [id number]        Your Vitals Were     Pulse Last Period Breastfeeding? BMI (Body Mass Index)          100 06/25/2018 No 33.83 kg/m2         Blood Pressure from Last 3 Encounters:   07/05/18 122/90   06/19/18 128/82   01/02/17 120/82    Weight from Last 3 Encounters:   07/05/18 203 lb 4.8 oz (92.2 kg)   06/19/18 206 lb 9.6 oz (93.7 kg)   01/02/17 214 lb (97.1 kg)               Primary Care Provider Office Phone # Fax #    Leda COLLIER CarolMITCHELL [phone]       [address] [facility] [address]        Equal Access to Services     [facility] : Hadii aad ku hadasho Sorojas, waaxda luqadaha, qaybta kaalmada mathew, bryce stevens . So [facility] [phone].    ATENCIÓN: Si habla español, tiene a pedersen disposición servicios gratuitos de asistencia lingüística. Isaak al [phone].    We comply with applicable federal civil rights laws and [place] laws. We do not discriminate on the basis of race, color, national origin, age, disability, sex, sexual orientation, or gender identity.            Thank you!     Thank you for choosing [facility]  for your care. Our goal is always to provide you with excellent care. Hearing back from our patients is one way we can continue to improve our services. Please take a few minutes to complete the written survey that you may receive in the mail after your visit with us. Thank you!             Your Updated Medication List - Protect others around you: Learn how to safely use, store and throw away your medicines at www.disposemymeds.org.          This list is accurate as of 7/5/18  3:45 PM.  Always use your most recent med list.                   Brand Name Dispense Instructions for use Diagnosis    levothyroxine 150 MCG tablet    SYNTHROID/LEVOTHROID    90 tablet    Take 1 tablet (150 mcg) by mouth  daily    Hypothyroidism, unspecified type

## 2018-07-05 NOTE — PROGRESS NOTES
CC: primary infertility    HPI: Hodan Iniguez presents for discussion of primary infertility. She has never conceived. Her partner has a child with another partner. He has been told he had a borderline low sperm count. They have had no testing to date for infertility otherwise. She is hypothyroid and has been recently tested.    Past Medical History:   Diagnosis Date     Anxiety disorder     No Comments Provided     Gastro-esophageal reflux disease without esophagitis     No Comments Provided     Hypothyroidism     No Comments Provided     Past Surgical History:   Procedure Laterality Date     CHOLECYSTECTOMY      No Comments Provided       Current Outpatient Prescriptions   Medication     levothyroxine (SYNTHROID/LEVOTHROID) 150 MCG tablet     No current facility-administered medications for this visit.        REVIEW OF SYSTEMS  General: negative  GI: negative  : negative  Endocrine: negative    Exam:  Gen: NAD    Results for orders placed or performed in visit on 06/19/18   Thyrotropin GH   Result Value Ref Range    Thyrotropin <0.20 (L) 0.34 - 5.60 IU/mL   T4 free   Result Value Ref Range    T4 Free 1.37 0.60 - 1.60 ng/dL   Glucose   Result Value Ref Range    Glucose 95 70 - 105 mg/dL       I/P:  (N97.9) Inability to conceive, female  Comment:   Plan: XR Hysterosalpingogram          She will discuss with her partner, recommend prior to day 9 of her cycle.  Recommend Urinary LH testing mid-cycle for ovulation  Recommend semen analysis to revisit her partner's count.    Wang Skinner MD FACOG  4:04 PM 7/5/2018

## 2018-07-05 NOTE — LETTER
7/5/2018     RE: Hodan Iniguez  [address]  [facility] [address]     Dear Colleague,    Thank you for referring your patient, Hodan Iniguez, to the [facility] at [facility]. Please see a copy of my visit note below.    CC: primary infertility    HPI: Hodan Iniguez presents for discussion of primary infertility. She has never conceived. Her partner has a child with another partner. He has been told he had a borderline low sperm count. They have had no testing to date for infertility otherwise. She is hypothyroid and has been recently tested.    Past Medical History:   Diagnosis Date     Anxiety disorder     No Comments Provided     Gastro-esophageal reflux disease without esophagitis     No Comments Provided     Hypothyroidism     No Comments Provided     Past Surgical History:   Procedure Laterality Date     CHOLECYSTECTOMY      No Comments Provided       Current Outpatient Prescriptions   Medication     levothyroxine (SYNTHROID/LEVOTHROID) 150 MCG tablet     No current facility-administered medications for this visit.        REVIEW OF SYSTEMS  General: negative  GI: negative  : negative  Endocrine: negative    Exam:  Gen: NAD    Results for orders placed or performed in visit on 06/19/18   Thyrotropin GH   Result Value Ref Range    Thyrotropin <0.20 (L) 0.34 - 5.60 IU/mL   T4 free   Result Value Ref Range    T4 Free 1.37 0.60 - 1.60 ng/dL   Glucose   Result Value Ref Range    Glucose 95 70 - 105 mg/dL     I/P:  (N97.9) Inability to conceive, female  Comment:   Plan: XR Hysterosalpingogram          She will discuss with her partner, recommend prior to day 9 of her cycle.  Recommend Urinary LH testing mid-cycle for ovulation  Recommend semen analysis to revisit her partner's count.    Wang Skinner MD FACOG  4:04 PM 7/5/2018

## 2018-07-05 NOTE — PATIENT INSTRUCTIONS
Try mid-cycle luteinizing hormone ovulation prediction testing.  Consider doing a hysterosalpingogram (HSG) for tubal patency.  Consider having your partner retested to check for sperm count.

## 2018-07-07 ENCOUNTER — MYC MEDICAL ADVICE (OUTPATIENT)
Dept: INTERNAL MEDICINE | Facility: OTHER | Age: 38
End: 2018-07-07

## 2018-07-07 DIAGNOSIS — F33.1 MODERATE EPISODE OF RECURRENT MAJOR DEPRESSIVE DISORDER (H): Primary | ICD-10-CM

## 2018-07-09 NOTE — TELEPHONE ENCOUNTER
Is she currently taking the effexor and wants to add zoloft or does she want to come off effexor and restart zoloft?   Was she ever on effexor and zoloft at the same time in the past?    Does she have an appointment with a mental health provider?

## 2018-07-09 NOTE — TELEPHONE ENCOUNTER
Patient requested I don't call back and to send a [employer] message see below  Dinora Ashley LPN...................7/9/2018   11:44 AM

## 2018-07-09 NOTE — TELEPHONE ENCOUNTER
Patient states had stopped the zoloft around when she saw you last or the day after. Wondering about going on this medication again? She would like to start with a low dose. She is not taking any anti depressant right now. She feels she needs this at this time, and states she does not feel great with out. Specifically mentioned Zoloft.       Dinora Ashley LPN...................7/9/2018   10:21 AM

## 2018-07-23 NOTE — PROGRESS NOTES
Patient Information     Patient Name  Hodan Iniguez MRN  [id number] Sex  Female   1980      Letter by Leda Medina NP at      Author:  Leda Medina NP Service:  (none) Author Type:  (none)    Filed:   Encounter Date:  2018 Status:  (Other)           Hodan Iniguez  [address] [facility] [address]          January 10, 2018        Dear Ms. Iniguez:    This letter is to remind you that you are due for your annual exam with Leda Medina NP. Your last comprehensive medication visit was more than 12 months ago.     A 90-day refill of Effexor was sent into your pharmacy. Additional refills of medications will require an annual medication management appointment with Leda Medina NP. Please call the clinic at [phone] to schedule your appointment.    Thank you for choosing [facility] and [facility] for your health care needs.     Sincerely,        The Refill Nurse  [facility]

## 2018-08-23 ENCOUNTER — MYC MEDICAL ADVICE (OUTPATIENT)
Dept: INTERNAL MEDICINE | Facility: OTHER | Age: 38
End: 2018-08-23

## 2018-08-23 DIAGNOSIS — B37.9 CANDIDA INFECTION: Primary | ICD-10-CM

## 2018-08-23 RX ORDER — FLUCONAZOLE 150 MG/1
150 TABLET ORAL ONCE
Qty: 1 TABLET | Refills: 1 | Status: SHIPPED | OUTPATIENT
Start: 2018-08-23 | End: 2018-08-23

## 2018-08-23 NOTE — TELEPHONE ENCOUNTER
Patient thinks she might have a yeast infection and would like a RX.    Tiny Waller LPN Supervisor 8/23/2018   7:05 AM

## 2018-09-17 DIAGNOSIS — E03.9 HYPOTHYROIDISM, UNSPECIFIED TYPE: ICD-10-CM

## 2018-09-17 NOTE — LETTER
September 19, 2018      Hodan D Geetha  [address]  [place] [address]        Dear Ms. Iniguez,    Your pharmacy has requested a refill of Synthroid.  This medication request is being addressed.     Just a reminder that you are overdue for a recheck on your thyroid labs. After your last visit on 6/19/2018, Amber Medina NP recommended that your TSH be checked in 6 weeks to ensure proper dosing of medication.     Your health is very important to us. Please contact our scheduling line at [phone] to set up this appointment at your earliest convenience, and before your next medication refills are needed.     Thank you for choosing [facility] for your health care needs.     Sincerely,        The Refill Nurses  [facility]

## 2018-09-19 RX ORDER — LEVOTHYROXINE SODIUM 150 UG/1
TABLET ORAL
Qty: 90 TABLET | Refills: 0 | OUTPATIENT
Start: 2018-09-19

## 2018-09-19 NOTE — TELEPHONE ENCOUNTER
Too soon, filled 6-20-18 for # 90 X 1 refill. Not due until December. Zina Pryor RN on 9/19/2018 at 2:22 PM

## 2018-12-19 ENCOUNTER — MYC REFILL (OUTPATIENT)
Dept: INTERNAL MEDICINE | Facility: OTHER | Age: 38
End: 2018-12-19

## 2018-12-19 DIAGNOSIS — E03.9 HYPOTHYROIDISM, UNSPECIFIED TYPE: ICD-10-CM

## 2018-12-19 RX ORDER — LEVOTHYROXINE SODIUM 150 UG/1
150 TABLET ORAL DAILY
Qty: 90 TABLET | Refills: 3 | Status: SHIPPED | OUTPATIENT
Start: 2018-12-19 | End: 2020-03-04

## 2019-02-15 ENCOUNTER — HEALTH MAINTENANCE LETTER (OUTPATIENT)
Age: 39
End: 2019-02-15

## 2019-05-06 ENCOUNTER — OFFICE VISIT (OUTPATIENT)
Dept: FAMILY MEDICINE | Facility: OTHER | Age: 39
End: 2019-05-06
Attending: NURSE PRACTITIONER
Payer: COMMERCIAL

## 2019-05-06 VITALS
SYSTOLIC BLOOD PRESSURE: 126 MMHG | DIASTOLIC BLOOD PRESSURE: 66 MMHG | WEIGHT: 208.5 LBS | HEART RATE: 84 BPM | TEMPERATURE: 96.1 F | RESPIRATION RATE: 18 BRPM | BODY MASS INDEX: 34.74 KG/M2 | HEIGHT: 65 IN

## 2019-05-06 DIAGNOSIS — N63.20 LEFT BREAST LUMP: ICD-10-CM

## 2019-05-06 DIAGNOSIS — Z00.00 ROUTINE GENERAL MEDICAL EXAMINATION AT A HEALTH CARE FACILITY: ICD-10-CM

## 2019-05-06 DIAGNOSIS — Z12.4 SCREENING FOR CERVICAL CANCER: ICD-10-CM

## 2019-05-06 DIAGNOSIS — B96.89 BACTERIAL VAGINOSIS: ICD-10-CM

## 2019-05-06 DIAGNOSIS — N76.0 BACTERIAL VAGINOSIS: ICD-10-CM

## 2019-05-06 DIAGNOSIS — Z11.51 SCREENING FOR HUMAN PAPILLOMAVIRUS: ICD-10-CM

## 2019-05-06 DIAGNOSIS — E03.9 HYPOTHYROIDISM, UNSPECIFIED TYPE: Primary | ICD-10-CM

## 2019-05-06 DIAGNOSIS — N89.8 VAGINAL DISCHARGE: ICD-10-CM

## 2019-05-06 DIAGNOSIS — B37.31 YEAST INFECTION OF THE VAGINA: ICD-10-CM

## 2019-05-06 LAB
ALBUMIN SERPL-MCNC: 4.5 G/DL (ref 3.5–5.7)
ALP SERPL-CCNC: 70 U/L (ref 34–104)
ALT SERPL W P-5'-P-CCNC: 10 U/L (ref 7–52)
ANION GAP SERPL CALCULATED.3IONS-SCNC: 7 MMOL/L (ref 3–14)
AST SERPL W P-5'-P-CCNC: 11 U/L (ref 13–39)
BILIRUB SERPL-MCNC: 0.3 MG/DL (ref 0.3–1)
BUN SERPL-MCNC: 20 MG/DL (ref 7–25)
C TRACH DNA SPEC QL PROBE+SIG AMP: NOT DETECTED
CALCIUM SERPL-MCNC: 9.5 MG/DL (ref 8.6–10.3)
CHLORIDE SERPL-SCNC: 106 MMOL/L (ref 98–107)
CO2 SERPL-SCNC: 27 MMOL/L (ref 21–31)
CREAT SERPL-MCNC: 0.77 MG/DL (ref 0.6–1.2)
ERYTHROCYTE [DISTWIDTH] IN BLOOD BY AUTOMATED COUNT: 12.9 % (ref 10–15)
GFR SERPL CREATININE-BSD FRML MDRD: 83 ML/MIN/{1.73_M2}
GLUCOSE SERPL-MCNC: 97 MG/DL (ref 70–105)
HCT VFR BLD AUTO: 39.4 % (ref 35–47)
HGB BLD-MCNC: 12.8 G/DL (ref 11.7–15.7)
MCH RBC QN AUTO: 30 PG (ref 26.5–33)
MCHC RBC AUTO-ENTMCNC: 32.5 G/DL (ref 31.5–36.5)
MCV RBC AUTO: 92 FL (ref 78–100)
N GONORRHOEA DNA SPEC QL PROBE+SIG AMP: NOT DETECTED
PLATELET # BLD AUTO: 336 10E9/L (ref 150–450)
POTASSIUM SERPL-SCNC: 3.4 MMOL/L (ref 3.5–5.1)
PROT SERPL-MCNC: 7.4 G/DL (ref 6.4–8.9)
RBC # BLD AUTO: 4.27 10E12/L (ref 3.8–5.2)
SODIUM SERPL-SCNC: 140 MMOL/L (ref 134–144)
SPECIMEN SOURCE: ABNORMAL
SPECIMEN SOURCE: NORMAL
TSH SERPL DL<=0.05 MIU/L-ACNC: <0.2 IU/ML (ref 0.34–5.6)
WBC # BLD AUTO: 11.8 10E9/L (ref 4–11)
WET PREP SPEC: ABNORMAL

## 2019-05-06 PROCEDURE — 87210 SMEAR WET MOUNT SALINE/INK: CPT | Mod: ZL | Performed by: NURSE PRACTITIONER

## 2019-05-06 PROCEDURE — 84443 ASSAY THYROID STIM HORMONE: CPT | Mod: ZL | Performed by: NURSE PRACTITIONER

## 2019-05-06 PROCEDURE — 80053 COMPREHEN METABOLIC PANEL: CPT | Mod: ZL | Performed by: NURSE PRACTITIONER

## 2019-05-06 PROCEDURE — 90715 TDAP VACCINE 7 YRS/> IM: CPT

## 2019-05-06 PROCEDURE — 36415 COLL VENOUS BLD VENIPUNCTURE: CPT | Mod: ZL | Performed by: NURSE PRACTITIONER

## 2019-05-06 PROCEDURE — 85027 COMPLETE CBC AUTOMATED: CPT | Mod: ZL | Performed by: NURSE PRACTITIONER

## 2019-05-06 PROCEDURE — G0463 HOSPITAL OUTPT CLINIC VISIT: HCPCS

## 2019-05-06 PROCEDURE — 99395 PREV VISIT EST AGE 18-39: CPT | Performed by: NURSE PRACTITIONER

## 2019-05-06 PROCEDURE — 88142 CYTOPATH C/V THIN LAYER: CPT | Performed by: NURSE PRACTITIONER

## 2019-05-06 PROCEDURE — 87491 CHLMYD TRACH DNA AMP PROBE: CPT | Mod: ZL | Performed by: NURSE PRACTITIONER

## 2019-05-06 PROCEDURE — 87624 HPV HI-RISK TYP POOLED RSLT: CPT | Mod: ZL | Performed by: NURSE PRACTITIONER

## 2019-05-06 PROCEDURE — 87591 N.GONORRHOEAE DNA AMP PROB: CPT | Mod: ZL | Performed by: NURSE PRACTITIONER

## 2019-05-06 PROCEDURE — 90471 IMMUNIZATION ADMIN: CPT

## 2019-05-06 PROCEDURE — G0123 SCREEN CERV/VAG THIN LAYER: HCPCS | Performed by: NURSE PRACTITIONER

## 2019-05-06 RX ORDER — FLUCONAZOLE 150 MG/1
150 TABLET ORAL ONCE
Qty: 1 TABLET | Refills: 0 | Status: SHIPPED | OUTPATIENT
Start: 2019-05-06 | End: 2019-05-30

## 2019-05-06 RX ORDER — METRONIDAZOLE 500 MG/1
500 TABLET ORAL 2 TIMES DAILY
Qty: 14 TABLET | Refills: 0 | Status: SHIPPED | OUTPATIENT
Start: 2019-05-06 | End: 2019-05-13

## 2019-05-06 RX ORDER — LEVOTHYROXINE SODIUM 100 UG/1
100 TABLET ORAL DAILY
Qty: 90 TABLET | Refills: 3 | Status: SHIPPED | OUTPATIENT
Start: 2019-05-06 | End: 2020-04-30

## 2019-05-06 SDOH — HEALTH STABILITY: MENTAL HEALTH: HOW MANY STANDARD DRINKS CONTAINING ALCOHOL DO YOU HAVE ON A TYPICAL DAY?: 1 OR 2

## 2019-05-06 SDOH — HEALTH STABILITY: MENTAL HEALTH: HOW OFTEN DO YOU HAVE A DRINK CONTAINING ALCOHOL?: MONTHLY OR LESS

## 2019-05-06 SDOH — HEALTH STABILITY: MENTAL HEALTH: HOW OFTEN DO YOU HAVE 6 OR MORE DRINKS ON ONE OCCASION?: LESS THAN MONTHLY

## 2019-05-06 ASSESSMENT — ANXIETY QUESTIONNAIRES
IF YOU CHECKED OFF ANY PROBLEMS ON THIS QUESTIONNAIRE, HOW DIFFICULT HAVE THESE PROBLEMS MADE IT FOR YOU TO DO YOUR WORK, TAKE CARE OF THINGS AT HOME, OR GET ALONG WITH OTHER PEOPLE: SOMEWHAT DIFFICULT
6. BECOMING EASILY ANNOYED OR IRRITABLE: MORE THAN HALF THE DAYS
7. FEELING AFRAID AS IF SOMETHING AWFUL MIGHT HAPPEN: NOT AT ALL
2. NOT BEING ABLE TO STOP OR CONTROL WORRYING: SEVERAL DAYS
1. FEELING NERVOUS, ANXIOUS, OR ON EDGE: NEARLY EVERY DAY
3. WORRYING TOO MUCH ABOUT DIFFERENT THINGS: SEVERAL DAYS
5. BEING SO RESTLESS THAT IT IS HARD TO SIT STILL: SEVERAL DAYS
GAD7 TOTAL SCORE: 9

## 2019-05-06 ASSESSMENT — PAIN SCALES - GENERAL: PAINLEVEL: MILD PAIN (2)

## 2019-05-06 ASSESSMENT — PATIENT HEALTH QUESTIONNAIRE - PHQ9
5. POOR APPETITE OR OVEREATING: SEVERAL DAYS
SUM OF ALL RESPONSES TO PHQ QUESTIONS 1-9: 7

## 2019-05-06 ASSESSMENT — MIFFLIN-ST. JEOR: SCORE: 1617.66
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
## 2019-05-06 NOTE — NURSING NOTE
Patient presents to clinic for physical and annual exam.  She has had some vaginal discharge with burning sensation for past 2 days.    Medication Reconciliation: complete    Zina Whitfield LPN

## 2019-05-07 PROCEDURE — G0123 SCREEN CERV/VAG THIN LAYER: HCPCS | Performed by: NURSE PRACTITIONER

## 2019-05-07 ASSESSMENT — ANXIETY QUESTIONNAIRES: GAD7 TOTAL SCORE: 9

## 2019-05-09 LAB
COPATH REPORT: NORMAL
PAP: NORMAL

## 2019-05-14 LAB
FINAL DIAGNOSIS: NORMAL
HPV HR 12 DNA CVX QL NAA+PROBE: NEGATIVE
HPV16 DNA SPEC QL NAA+PROBE: NEGATIVE
HPV18 DNA SPEC QL NAA+PROBE: NEGATIVE
SPECIMEN DESCRIPTION: NORMAL
SPECIMEN SOURCE CVX/VAG CYTO: NORMAL

## 2019-05-28 DIAGNOSIS — B37.31 YEAST INFECTION OF THE VAGINA: ICD-10-CM

## 2019-05-29 NOTE — TELEPHONE ENCOUNTER
Writer received response as follows from NP:    Wilda Solis, NP  [facility] Nurse 4 hours ago (12:25 PM)      Can you see if you can get hold of patient. I would like to know if her previous yeast infection resolved and this is new or a continuation of her previous yeast infection on 5/6.   -Wilda     Routing comment        Writer had previously attempted to reach patient without success and additional attempt was unsuccessful at this time. Writer will re-route Rx request to PCP for her consideration/approval. Writer is unable to determine if yeast infection has recurred.    George Payan RN on 5/29/2019 at 4:39 PM

## 2019-05-29 NOTE — TELEPHONE ENCOUNTER
Chart review shows that Rx as requested was last filled at office visit with PCP on 5/6/19. Patient with vaginosis as well as a yeast infection as per office visit notes on that date. Call placed to patient to see if she is again symptomatic. Patient was not available at this time. PCP is in the office and Rx as requested is not on RN refill protocol. Writer will zoya up and route Rx request to PCP for her consideration/approval.    Unable to complete prescription refill per RN Medication Refill Policy. George Payan 5/29/2019 8:29 AM

## 2019-05-30 RX ORDER — FLUCONAZOLE 150 MG/1
150 TABLET ORAL ONCE
Qty: 1 TABLET | Refills: 0 | Status: SHIPPED | OUTPATIENT
Start: 2019-05-30 | End: 2019-05-30

## 2019-06-05 DIAGNOSIS — F33.1 MODERATE EPISODE OF RECURRENT MAJOR DEPRESSIVE DISORDER (H): ICD-10-CM

## 2019-06-10 NOTE — TELEPHONE ENCOUNTER
LOV with PCP was on 5/6/19 for a physical. RN refill protocol fails as PHQ9 on that date was a score of 7. Writer is unable to fill Rx as requested. Will zoya up and route Rx request to PCP for her consideration/approval.    Unable to complete prescription refill per RN Medication Refill Policy. George Payan 6/10/2019 11:16 AM

## 2019-06-17 ENCOUNTER — MYC MEDICAL ADVICE (OUTPATIENT)
Dept: FAMILY MEDICINE | Facility: OTHER | Age: 39
End: 2019-06-17

## 2019-09-09 ENCOUNTER — MYC MEDICAL ADVICE (OUTPATIENT)
Dept: FAMILY MEDICINE | Facility: OTHER | Age: 39
End: 2019-09-09

## 2019-09-09 NOTE — TELEPHONE ENCOUNTER
Left message for patient to return call to clinic to inquire why she would like increase in Sertraline dose.  Need to completed the PHQ and JUAN CARLOS screenings.    Zina Whitfield LPN............9/9/2019 2:43 PM

## 2019-10-30 ENCOUNTER — MYC MEDICAL ADVICE (OUTPATIENT)
Dept: FAMILY MEDICINE | Facility: OTHER | Age: 39
End: 2019-10-30

## 2019-10-31 ENCOUNTER — MYC MEDICAL ADVICE (OUTPATIENT)
Dept: INTERNAL MEDICINE | Facility: OTHER | Age: 39
End: 2019-10-31

## 2019-10-31 ENCOUNTER — HOSPITAL ENCOUNTER (EMERGENCY)
Facility: OTHER | Age: 39
Discharge: HOME | End: 2019-10-31
Attending: FAMILY MEDICINE | Admitting: FAMILY MEDICINE
Payer: COMMERCIAL

## 2019-10-31 VITALS
DIASTOLIC BLOOD PRESSURE: 95 MMHG | BODY MASS INDEX: 35.85 KG/M2 | TEMPERATURE: 98.4 F | SYSTOLIC BLOOD PRESSURE: 148 MMHG | RESPIRATION RATE: 16 BRPM | HEIGHT: 64 IN | WEIGHT: 210 LBS | OXYGEN SATURATION: 100 %

## 2019-10-31 DIAGNOSIS — K08.89 PAIN, DENTAL: ICD-10-CM

## 2019-10-31 PROCEDURE — 99283 EMERGENCY DEPT VISIT LOW MDM: CPT | Mod: Z6 | Performed by: FAMILY MEDICINE

## 2019-10-31 PROCEDURE — 99282 EMERGENCY DEPT VISIT SF MDM: CPT | Performed by: FAMILY MEDICINE

## 2019-10-31 RX ORDER — HYDROCODONE BITARTRATE AND ACETAMINOPHEN 5; 325 MG/1; MG/1
1 TABLET ORAL EVERY 6 HOURS PRN
Qty: 12 TABLET | Refills: 0 | Status: SHIPPED | OUTPATIENT
Start: 2019-10-31 | End: 2020-03-04

## 2019-10-31 ASSESSMENT — MIFFLIN-ST. JEOR: SCORE: 1607.55

## 2019-10-31 NOTE — DISCHARGE INSTRUCTIONS
There is 325 mg of tylenol in each pill. Please count these in when you are taking the tylenol. You can take up to 4000 mg daily of tylenol.   You can take up to total of 2400 mg of ibuprofen daily - I 'd recommend 800 mg three times daily or 600 mg 4 times daily.   Norco 1 tablet every 6 hours as needed for pain.  Continue antibiotics.  Followup with primary dentist as directed.

## 2019-10-31 NOTE — ED PROVIDER NOTES
History     Chief Complaint   Patient presents with     Jaw Pain     HPI  Hodan Iniguez is a 39 year old female who is in the ER with dental pain that she is started to have her dentist yesterday.  She is scheduled to have a molar removed and oral surgery and was placed on antibiotics but has only taken 4 doses of it total.  She was using ibuprofen and Tylenol alternating but has not been able to control the discomfort and now is going down into her lower jaw.  She has not had any fever or chills.  She has been taking as much as 6 Tylenol at a time and 6-8 ibuprofen every time she takes them.    Allergies:  No Known Allergies    Problem List:    Patient Active Problem List    Diagnosis Date Noted     Major depression, recurrent (H) 01/04/2013     Priority: Medium     Hypothyroidism 09/24/2010     Priority: Medium        Past Medical History:    Past Medical History:   Diagnosis Date     Anxiety disorder      Gastro-esophageal reflux disease without esophagitis      Hypothyroidism      Migraine        Past Surgical History:    Past Surgical History:   Procedure Laterality Date     CHOLECYSTECTOMY      No Comments Provided       Family History:    Family History   Problem Relation Age of Onset     Prostate Cancer Father         Cancer-prostate     Hypertension Father         Hypertension     Hypertension Mother         Hypertension     Mental Illness Mother         Mental illness,sees a therapist     Infertility Sister      Thyroid Disease Sister      Infertility Sister      Thyroid Disease Sister      Cancer Paternal Grandmother 80        Cancer,lung cancer smoker     Mental Illness Brother         Mental illness,anger, therapy       Social History:  Marital Status:  Single [1]  Social History     Tobacco Use     Smoking status: Never Smoker     Smokeless tobacco: Never Used   Substance Use Topics     Alcohol use: Never     Alcohol/week: 0.0 standard drinks     Frequency: Monthly or less     Drinks per session: 1  "or 2     Binge frequency: Less than monthly     Comment: Alcoholic Drinks/day: social     Drug use: No        Medications:    HYDROcodone-acetaminophen (NORCO) 5-325 MG tablet  Cholecalciferol (UPSPRINGBABY VITAMIN D3 PO)  levothyroxine (SYNTHROID/LEVOTHROID) 100 MCG tablet  levothyroxine (SYNTHROID/LEVOTHROID) 150 MCG tablet  sertraline (ZOLOFT) 50 MG tablet          Review of Systems   All other systems reviewed and are negative.      Physical Exam   BP: (!) 148/95  Heart Rate: 90  Temp: 98.4  F (36.9  C)  Resp: 16  Height: 162.6 cm (5' 4\")  Weight: 95.3 kg (210 lb)  SpO2: 100 %      Physical Exam  Vitals signs and nursing note reviewed.   Constitutional:       Appearance: Normal appearance.   HENT:      Head: Normocephalic and atraumatic.      Nose: Nose normal.      Mouth/Throat:      Mouth: Mucous membranes are moist.      Comments: Patient does not have any obvious decay.  There is little bit of swelling around the last molar on the left upper jaw.  It is nontender to percussion.  She does have some slight discomfort to palpation around the TMJ and then down into the lower jaw.  Neurological:      Mental Status: She is alert.         ED Course   Recent seen and examined.   consulted and she has not had any narcotics in the last year.  Discussed with patient the importance of not over dosing on Tylenol or ibuprofen as this can cause other problems for her.  Recommended no more than 4000 mg of Tylenol per day and 2400 mg of ibuprofen alternating.  She understood this to mean thousand milligrams every 6 hours of Tylenol and that she needs to take away the Tylenol amount that is in the Norco she uses that instead.    Norco 5/325 mg 1 every 6 hours as needed for severe pain.  She will follow-up with her dentist for continued care.  She was comfortable that plan.  Recommended warm packs and saline gargles to help with any discomfort.     Procedures    No results found for this or any previous visit (from the past " 24 hour(s)).    Medications - No data to display    Assessments & Plan (with Medical Decision Making)     I have reviewed the nursing notes.    I have reviewed the findings, diagnosis, plan and need for follow up with the patient.    Discharge Medication List as of 10/31/2019  2:38 PM      START taking these medications    Details   HYDROcodone-acetaminophen (NORCO) 5-325 MG tablet Take 1 tablet by mouth every 6 hours as needed for severe pain, Disp-12 tablet, R-0, Local Print             Final diagnoses:   Pain, dental       10/31/2019   [facility] MD  10/31/19 [id number]

## 2019-10-31 NOTE — ED AVS SNAPSHOT
[facility]  [address]  [place] [address]  Phone:  [phone]  Fax:  [phone]                                    Hodan Iniguez   MRN: [id number]    Department:  [facility]   Date of Visit:  10/31/2019           After Visit Summary Signature Page    I have received my discharge instructions, and my questions have been answered. I have discussed any challenges I see with this plan with the nurse or doctor.    ..........................................................................................................................................  Patient/Patient Representative Signature      ..........................................................................................................................................  Patient Representative Print Name and Relationship to Patient    ..................................................               ................................................  Date                                   Time    ..........................................................................................................................................  Reviewed by Signature/Title    ...................................................              ..............................................  Date                                               Time          22EPIC Rev 08/18

## 2019-10-31 NOTE — ED TRIAGE NOTES
"ED Nursing Triage Note (General)   ________________________________    Hodan Iniguez is a 39 year old Female that presents to triage private car  With history of  Dental issues stating she has to have an molar removed and after seeing her dentist yesterday and told she has gum disease also has been having some pain at her upper jaw/incisor area that is not relieved with the combination of ibuprofen/acetaminophen reported by patient   Significant symptoms had onset 24 hour(s) ago.  BP (!) 148/95   Temp 98.4  F (36.9  C) (Oral)   Resp 16   Ht 1.626 m (5' 4\")   Wt 95.3 kg (210 lb)   LMP 09/30/2019   SpO2 100%   Breastfeeding? No   BMI 36.05 kg/m  t  Patient appears alert , in moderate distress., and cooperative behavior.    GCS Total = 15  Airway: intact  Breathing noted as Normal.  Circulation Normal  Skin normal  Action taken:  To waiting room      PRE HOSPITAL PRIOR LIVING SITUATION Spouse  "

## 2019-11-09 ENCOUNTER — MYC MEDICAL ADVICE (OUTPATIENT)
Dept: FAMILY MEDICINE | Facility: OTHER | Age: 39
End: 2019-11-09

## 2020-01-03 ENCOUNTER — TELEPHONE (OUTPATIENT)
Dept: FAMILY MEDICINE | Facility: OTHER | Age: 40
End: 2020-01-03

## 2020-01-03 NOTE — TELEPHONE ENCOUNTER
Attempted to reach pharmacy. Unable to get through, due to volume of calls. This medication has been discontinued.

## 2020-02-07 ENCOUNTER — TELEPHONE (OUTPATIENT)
Dept: INTERNAL MEDICINE | Facility: OTHER | Age: 40
End: 2020-02-07

## 2020-03-04 ENCOUNTER — MYC MEDICAL ADVICE (OUTPATIENT)
Dept: FAMILY MEDICINE | Facility: OTHER | Age: 40
End: 2020-03-04

## 2020-03-04 ENCOUNTER — OFFICE VISIT (OUTPATIENT)
Dept: FAMILY MEDICINE | Facility: OTHER | Age: 40
End: 2020-03-04
Attending: NURSE PRACTITIONER
Payer: COMMERCIAL

## 2020-03-04 VITALS
SYSTOLIC BLOOD PRESSURE: 134 MMHG | HEART RATE: 89 BPM | TEMPERATURE: 97.8 F | RESPIRATION RATE: 20 BRPM | OXYGEN SATURATION: 96 % | DIASTOLIC BLOOD PRESSURE: 80 MMHG | WEIGHT: 218.13 LBS | BODY MASS INDEX: 37.24 KG/M2 | HEIGHT: 64 IN

## 2020-03-04 DIAGNOSIS — E03.9 HYPOTHYROIDISM, UNSPECIFIED TYPE: ICD-10-CM

## 2020-03-04 DIAGNOSIS — N76.0 BACTERIAL VAGINOSIS: ICD-10-CM

## 2020-03-04 DIAGNOSIS — E05.90 HYPERTHYROIDISM: Primary | ICD-10-CM

## 2020-03-04 DIAGNOSIS — R53.83 FATIGUE, UNSPECIFIED TYPE: ICD-10-CM

## 2020-03-04 DIAGNOSIS — N89.8 VAGINAL DISCHARGE: ICD-10-CM

## 2020-03-04 DIAGNOSIS — B96.89 BACTERIAL VAGINOSIS: ICD-10-CM

## 2020-03-04 DIAGNOSIS — B37.31 YEAST INFECTION OF THE VAGINA: ICD-10-CM

## 2020-03-04 DIAGNOSIS — Z20.2 POSSIBLE EXPOSURE TO STD: ICD-10-CM

## 2020-03-04 LAB
C TRACH DNA SPEC QL PROBE+SIG AMP: NOT DETECTED
ERYTHROCYTE [DISTWIDTH] IN BLOOD BY AUTOMATED COUNT: 12.9 % (ref 10–15)
HCG UR QL: NEGATIVE
HCT VFR BLD AUTO: 39.7 % (ref 35–47)
HGB BLD-MCNC: 12.6 G/DL (ref 11.7–15.7)
MCH RBC QN AUTO: 29.9 PG (ref 26.5–33)
MCHC RBC AUTO-ENTMCNC: 31.7 G/DL (ref 31.5–36.5)
MCV RBC AUTO: 94 FL (ref 78–100)
N GONORRHOEA DNA SPEC QL PROBE+SIG AMP: NOT DETECTED
PLATELET # BLD AUTO: 332 10E9/L (ref 150–450)
RBC # BLD AUTO: 4.21 10E12/L (ref 3.8–5.2)
SPECIMEN SOURCE: ABNORMAL
SPECIMEN SOURCE: NORMAL
T4 FREE SERPL-MCNC: 0.76 NG/DL (ref 0.6–1.6)
TSH SERPL DL<=0.05 MIU/L-ACNC: 24.82 IU/ML (ref 0.34–5.6)
WBC # BLD AUTO: 6.5 10E9/L (ref 4–11)
WET PREP SPEC: ABNORMAL

## 2020-03-04 PROCEDURE — 36415 COLL VENOUS BLD VENIPUNCTURE: CPT | Mod: ZL | Performed by: NURSE PRACTITIONER

## 2020-03-04 PROCEDURE — 81025 URINE PREGNANCY TEST: CPT | Mod: ZL | Performed by: NURSE PRACTITIONER

## 2020-03-04 PROCEDURE — 87210 SMEAR WET MOUNT SALINE/INK: CPT | Mod: ZL | Performed by: NURSE PRACTITIONER

## 2020-03-04 PROCEDURE — 84439 ASSAY OF FREE THYROXINE: CPT | Mod: ZL | Performed by: NURSE PRACTITIONER

## 2020-03-04 PROCEDURE — G0463 HOSPITAL OUTPT CLINIC VISIT: HCPCS

## 2020-03-04 PROCEDURE — 85027 COMPLETE CBC AUTOMATED: CPT | Mod: ZL | Performed by: NURSE PRACTITIONER

## 2020-03-04 PROCEDURE — 87491 CHLMYD TRACH DNA AMP PROBE: CPT | Mod: ZL | Performed by: NURSE PRACTITIONER

## 2020-03-04 PROCEDURE — 84443 ASSAY THYROID STIM HORMONE: CPT | Mod: ZL | Performed by: NURSE PRACTITIONER

## 2020-03-04 PROCEDURE — 87591 N.GONORRHOEAE DNA AMP PROB: CPT | Mod: ZL | Performed by: NURSE PRACTITIONER

## 2020-03-04 PROCEDURE — 99214 OFFICE O/P EST MOD 30 MIN: CPT | Performed by: NURSE PRACTITIONER

## 2020-03-04 RX ORDER — METRONIDAZOLE 500 MG/1
500 TABLET ORAL 2 TIMES DAILY
Qty: 14 TABLET | Refills: 0 | Status: SHIPPED | OUTPATIENT
Start: 2020-03-04 | End: 2020-03-11

## 2020-03-04 RX ORDER — LEVOTHYROXINE SODIUM 25 UG/1
25 TABLET ORAL DAILY
Qty: 90 TABLET | Refills: 0 | Status: SHIPPED | OUTPATIENT
Start: 2020-03-04 | End: 2020-06-03

## 2020-03-04 RX ORDER — FLUCONAZOLE 150 MG/1
150 TABLET ORAL ONCE
Qty: 1 TABLET | Refills: 0 | Status: SHIPPED | OUTPATIENT
Start: 2020-03-04 | End: 2020-03-04

## 2020-03-04 ASSESSMENT — ENCOUNTER SYMPTOMS
FATIGUE: 1
CHILLS: 0
FEVER: 0
UNEXPECTED WEIGHT CHANGE: 0

## 2020-03-04 ASSESSMENT — PAIN SCALES - GENERAL: PAINLEVEL: NO PAIN (0)

## 2020-03-04 ASSESSMENT — MIFFLIN-ST. JEOR: SCORE: 1644.41

## 2020-03-04 NOTE — PROGRESS NOTES
"  SUBJECTIVE:   Hodan Iniguez is a 39 year old female who presents to clinic today for the following health issues:    HPI  Patient presents for evaluation of fatigue and STD check. Concern for gonorrhea from boyfriend. He reports being tested yesterday, but did not have his results. Patient reports no vaginal discharge or vaginal pain. No urinary symptoms. Is not on birth control. Has PCOS and they are trying to get pregnant. Difficult as her boyfriend lives in [place], but sounds like he is planning on moving up here.   Notes ongoing fatigue. Reports worse in the last week feeling like she can't keep her eyes open. Worked 40 hours last week, which is more than usual. She reports sleep unchanged. Depression remains unchanged. Still on Zoloft.   LMP last week. Periods have seemed more heavy than usual. Usually consistently getting one a month.      Patient Active Problem List    Diagnosis Date Noted     Major depression, recurrent (H) 01/04/2013     Priority: Medium     Hypothyroidism 09/24/2010     Priority: Medium     Past Medical History:   Diagnosis Date     Anxiety disorder     No Comments Provided     Gastro-esophageal reflux disease without esophagitis     No Comments Provided     Hypothyroidism     No Comments Provided     Migraine       Past Surgical History:   Procedure Laterality Date     CHOLECYSTECTOMY      No Comments Provided       Review of Systems   Constitutional: Positive for fatigue. Negative for chills, fever and unexpected weight change.   HENT: Negative.    Genitourinary: Negative for menstrual problem, pelvic pain, vaginal bleeding, vaginal discharge and vaginal pain.        OBJECTIVE:     /80 (BP Location: Right arm, Patient Position: Sitting, Cuff Size: Adult Large)   Pulse 89   Temp 97.8  F (36.6  C) (Tympanic)   Resp 20   Ht 1.626 m (5' 4\")   Wt 98.9 kg (218 lb 2 oz)   LMP  (LMP Unknown)   SpO2 96%   Breastfeeding No   BMI 37.44 kg/m    Body mass index is 37.44 " kg/m .  Physical Exam  Genitourinary:     Vagina: Vaginal discharge present. No erythema, tenderness, bleeding or lesions.         Diagnostic Test Results:  Results for orders placed or performed in visit on 03/04/20 (from the past 24 hour(s))   TSH Reflex GH   Result Value Ref Range    TSH Reflex 24.82 (H) 0.34 - 5.60 IU/mL   CBC W PLT No Diff   Result Value Ref Range    WBC 6.5 4.0 - 11.0 10e9/L    RBC Count 4.21 3.8 - 5.2 10e12/L    Hemoglobin 12.6 11.7 - 15.7 g/dL    Hematocrit 39.7 35.0 - 47.0 %    MCV 94 78 - 100 fl    MCH 29.9 26.5 - 33.0 pg    MCHC 31.7 31.5 - 36.5 g/dL    RDW 12.9 10.0 - 15.0 %    Platelet Count 332 150 - 450 10e9/L   Pregnancy, Urine (HCG)   Result Value Ref Range    HCG Qual Urine Negative NEG^Negative   GC/Chlamydia by PCR - [facility]   Result Value Ref Range    Specimen Source Cervical     Neisseria gonorrhoreae PCR Not Detected NDET^Not Detected    Chlamydia Trachomatis PCR Not Detected NDET^Not Detected   Wet Prep, Genital   Result Value Ref Range    Specimen Description Vagina     Wet Prep Yeast seen (A)     Wet Prep Clue cells seen (A)     Wet Prep No Trichomonas seen        ASSESSMENT/PLAN:   1. Hyperthyroidism  TSH pending. Previously level was <0.20 on 5/6/2019 and had dose decreased. She did not complete further follow-up as directed. TSH from today shows hypothyroidism and is likely contributing to her fatigue. Medication to be adjusted to 125 mcg. Recheck in 6 weeks. Lab work ordered. 90 day supply given.   - TSH Reflex GH; Future  - TSH Reflex GH    2. Fatigue, unspecified type  Pregnancy negative. No evidence of low hemoglobin. Likely related to hypothyroidism. Medication to be adjusted.   - Pregnancy, Urine (HCG)  - CBC W PLT No Diff; Future  - CBC W PLT No Diff    3. Possible exposure to STD  GC/Chlamydia negative. She will call if boyfriend has positive results and then would advise treatment.   - GC/Chlamydia by PCR - [facility]    4. Vaginal discharge  Moderate amount of white  thick discharge noted upon exam. Shows both yeast and clue cells. Unable to get a hold of patient to ask preference for oral treatment or intravaginal treatment. Plan on intravaginal miconazole x 1 and oral treatment with Flagyl x 7 days. Follow-up if discharge persists.   - Wet Prep, Genital          Wilda Solis FNP-[facility] AND [facility]

## 2020-03-04 NOTE — TELEPHONE ENCOUNTER
Prescription has been sent into [facility] pharmacy for patient's treatment of bacterial vaginosis.    Zina Whitfield LPN............3/4/2020 3:03 PM

## 2020-03-04 NOTE — NURSING NOTE
Patient presents to clinic experiencing constant fatigue and headaches.  Also, boyfriend might have gonorrhea and she would like STD testing.    Medication Reconciliation: complete    Zina Whitfield LPN

## 2020-03-11 ENCOUNTER — HEALTH MAINTENANCE LETTER (OUTPATIENT)
Age: 40
End: 2020-03-11

## 2020-04-17 ENCOUNTER — VIRTUAL VISIT (OUTPATIENT)
Dept: FAMILY MEDICINE | Facility: OTHER | Age: 40
End: 2020-04-17
Attending: PHYSICIAN ASSISTANT
Payer: COMMERCIAL

## 2020-04-17 ENCOUNTER — MYC MEDICAL ADVICE (OUTPATIENT)
Dept: FAMILY MEDICINE | Facility: OTHER | Age: 40
End: 2020-04-17

## 2020-04-17 VITALS — HEIGHT: 64 IN | BODY MASS INDEX: 36.02 KG/M2 | TEMPERATURE: 98.7 F | WEIGHT: 211 LBS

## 2020-04-17 DIAGNOSIS — B37.0 THRUSH: ICD-10-CM

## 2020-04-17 DIAGNOSIS — E03.9 HYPOTHYROIDISM, UNSPECIFIED TYPE: ICD-10-CM

## 2020-04-17 DIAGNOSIS — E28.2 PCOS (POLYCYSTIC OVARIAN SYNDROME): Primary | ICD-10-CM

## 2020-04-17 DIAGNOSIS — R09.82 PND (POST-NASAL DRIP): ICD-10-CM

## 2020-04-17 DIAGNOSIS — N97.9 FEMALE INFERTILITY: ICD-10-CM

## 2020-04-17 PROCEDURE — 99442 ZZC PHYSICIAN TELEPHONE EVALUATION 11-20 MIN: CPT | Performed by: PHYSICIAN ASSISTANT

## 2020-04-17 RX ORDER — FLUTICASONE PROPIONATE 50 MCG
2 SPRAY, SUSPENSION (ML) NASAL DAILY
Qty: 16 G | Refills: 3 | Status: SHIPPED | OUTPATIENT
Start: 2020-04-17 | End: 2021-04-08

## 2020-04-17 RX ORDER — NYSTATIN 100000/ML
500000 SUSPENSION, ORAL (FINAL DOSE FORM) ORAL 4 TIMES DAILY
Qty: 140 ML | Refills: 0 | Status: SHIPPED | OUTPATIENT
Start: 2020-04-17 | End: 2020-04-24

## 2020-04-17 RX ORDER — LEVOTHYROXINE SODIUM 150 UG/1
TABLET ORAL
Qty: 90 TABLET | Refills: 3 | OUTPATIENT
Start: 2020-04-17

## 2020-04-17 ASSESSMENT — PAIN SCALES - GENERAL: PAINLEVEL: NO PAIN (0)

## 2020-04-17 ASSESSMENT — MIFFLIN-ST. JEOR: SCORE: 1612.09

## 2020-04-17 NOTE — PATIENT INSTRUCTIONS
Sent nystatin to the pharmacy for thrush treatment.  Use 4 times daily for 7 days for treatment.  Please call or return if symptoms are persistent or worsening.    Started on metformin once every morning for PCOS treatment.  Please take with breakfast.  Please complete a telephone recheck in the next month for medication monitoring.    Patient was given a referral to OB/GYN to discuss infertility concerns along with PCOS.  Please schedule your convenience.    Please set up a lab only appointment in approximately 3 to 4 weeks to have your thyroid level rechecked.

## 2020-04-17 NOTE — TELEPHONE ENCOUNTER
Patient states she would like to try Metformin for weight loss, hair growth and PCOS ( trying to get pregnant.)  She is experiencing sinus drainage, white film with bumps on the tongue.    Zina Whitfield LPN............4/17/2020 11:41 AM

## 2020-04-17 NOTE — TELEPHONE ENCOUNTER
Levothyroxine 100 mcg refilled on 5/6/19 #90 x 3 reffills  Levothyroxine 25 mcg refilled on 3/4/2020 #90 to [facility] [place].    Nuvia Arrieta RN on 4/17/2020 at 2:17 PM

## 2020-04-17 NOTE — TELEPHONE ENCOUNTER
Are we able to change this note into a telephone visit to discuss her concerns?  Toña Beckham PA-C.......... 4/17/2020 3:25 PM

## 2020-04-17 NOTE — PROGRESS NOTES
"Hodan Iniguez is a 39 year old female who is being evaluated via a billable telephone visit.      The patient has been notified of following:     \"This telephone visit will be conducted via a call between you and your physician/provider. We have found that certain health care needs can be provided without the need for a physical exam.  This service lets us provide the care you need with a short phone conversation.  If a prescription is necessary we can send it directly to your pharmacy.  If lab work is needed we can place an order for that and you can then stop by our lab to have the test done at a later time.    Telephone visits are billed at different rates depending on your insurance coverage. During this emergency period, for some insurers they may be billed the same as an in-person visit.  Please reach out to your insurance provider with any questions.    If during the course of the call the physician/provider feels a telephone visit is not appropriate, you will not be charged for this service.\"    Patient has given verbal consent for Telephone visit?  Yes    How would you like to obtain your AVS? Carla Olivera     Hodan Iniguez is a 39 year old female who presents to clinic today for the following health issues:    Previously discussed starting on metformin with Wilda Solis NP.  Patient is requesting starting on this medication.  She has history of PCOS..  Have body hair that is getting worse.  Her hair tends to grow very fast.  She is interested in trying to get pregnant.  She has been trying with her partner for several years.  Is already been to OB/GYN to discuss infertility.  Interested in going OB/GYN again.  Previously took metformin several years ago.  Helped before.  Would like to restart.    Recently she has noticed bumps on back of tongue with white film.  Concerned about thrush. Bumps on back of tongue.  Able to scrape white stuff off tongue.  No sore throat, fevers, chills.  No " recent antibiotics.      Nose has been running for months. Bad cough a few weeks ago.  She has noticed some postnasal drip.  She has been sick for a few weeks.  Initially had hot and cold symptoms a few weeks ago.  Sinus pain and pressure is also been resolving.  She has headaches off and on.  Has been using sinus medication as needed.  DayQuil is not helping.  Wondering what she can use for her postnasal drip.  Had to take some time off work.  Keeping food and fluids down.  No dehydration concerns.      Patient Active Problem List   Diagnosis     Hypothyroidism     Major depression, recurrent (H)     Past Surgical History:   Procedure Laterality Date     CHOLECYSTECTOMY      No Comments Provided       Social History     Tobacco Use     Smoking status: Never Smoker     Smokeless tobacco: Never Used   Substance Use Topics     Alcohol use: Never     Alcohol/week: 0.0 standard drinks     Frequency: Monthly or less     Drinks per session: 1 or 2     Binge frequency: Less than monthly     Comment: Alcoholic Drinks/day: social     Family History   Problem Relation Age of Onset     Prostate Cancer Father         Cancer-prostate     Hypertension Father         Hypertension     Hypertension Mother         Hypertension     Mental Illness Mother         Mental illness,sees a therapist     Infertility Sister      Thyroid Disease Sister      Infertility Sister      Thyroid Disease Sister      Cancer Paternal Grandmother 80        Cancer,lung cancer smoker     Mental Illness Brother         Mental illness,anger, therapy         Current Outpatient Medications   Medication Sig Dispense Refill     levothyroxine (SYNTHROID/LEVOTHROID) 100 MCG tablet Take 1 tablet (100 mcg) by mouth daily 90 tablet 3     levothyroxine (SYNTHROID/LEVOTHROID) 25 MCG tablet Take 1 tablet (25 mcg) by mouth daily Take with 100 mcg dose for a total of 125 mcg daily. Follow-up with lab work in 6 weeks. 90 tablet 0     metFORMIN (GLUCOPHAGE) 500 MG tablet  "Take 1 tablet (500 mg) by mouth daily (with breakfast) 90 tablet 1     nystatin (MYCOSTATIN) 908616 UNIT/ML suspension Take 5 mLs (500,000 Units) by mouth 4 times daily for 7 days 140 mL 0     sertraline (ZOLOFT) 50 MG tablet TAKE 1 TABLET(50 MG) BY MOUTH DAILY 90 tablet 3     No Known Allergies  BP Readings from Last 3 Encounters:   03/04/20 134/80   10/31/19 (!) 148/95   05/06/19 126/66    Wt Readings from Last 3 Encounters:   04/17/20 95.7 kg (211 lb)   03/04/20 98.9 kg (218 lb 2 oz)   10/31/19 95.3 kg (210 lb)                    Reviewed and updated as needed this visit by Provider         Review of Systems   ROS COMP: Constitutional, HEENT, cardiovascular, pulmonary, gi and gu systems are negative, except as otherwise noted.       Objective   Reported vitals:  Temp 98.7  F (37.1  C) (Oral)   Ht 1.626 m (5' 4\")   Wt 95.7 kg (211 lb)   LMP  (LMP Unknown)   Breastfeeding No   BMI 36.22 kg/m     healthy, alert and no distress  PSYCH: Alert and oriented times 3; coherent speech, normal   rate and volume, able to articulate logical thoughts, able   to abstract reason, no tangential thoughts, no hallucinations   or delusions  Her affect is normal  RESP: No cough, no audible wheezing, able to talk in full sentences  Remainder of exam unable to be completed due to telephone visits    Diagnostic Test Results:  none         Assessment/Plan:  1. PCOS (polycystic ovarian syndrome)  Started on metformin once every morning for PCOS treatment.  Please take with breakfast.  Please complete a telephone recheck in the next month for medication monitoring.  Side effect profile was discussed.  Encouraged to set up an appointment with OB/GYN for consult.  - OB/GYN REFERRAL  - metFORMIN (GLUCOPHAGE) 500 MG tablet; Take 1 tablet (500 mg) by mouth daily (with breakfast)  Dispense: 90 tablet; Refill: 1    2. Thrush  Sent nystatin to the pharmacy for thrush treatment.  Use 4 times daily for 7 days for treatment.  Please call or " return if symptoms are persistent or worsening.  - nystatin (MYCOSTATIN) 646931 UNIT/ML suspension; Take 5 mLs (500,000 Units) by mouth 4 times daily for 7 days  Dispense: 140 mL; Refill: 0    3. Female infertility  Patient was given a referral to OB/GYN to discuss infertility concerns along with PCOS.  Please schedule your convenience.  - OB/GYN REFERRAL    4.  Postnasal drip  Patient was started on fluticasone nasal spray daily.  Encouraged to use over-the-counter cough and cold remedies as needed.  Can also try Claritin or Zyrtec daily as needed. Return to clinic or call with change/worsening of symptoms.     Return if symptoms worsen or fail to improve.    Patient Instructions   Sent nystatin to the pharmacy for thrush treatment.  Use 4 times daily for 7 days for treatment.  Please call or return if symptoms are persistent or worsening.    Started on metformin once every morning for PCOS treatment.  Please take with breakfast.  Please complete a telephone recheck in the next month for medication monitoring.    Patient was given a referral to OB/GYN to discuss infertility concerns along with PCOS.  Please schedule your convenience.    Please set up a lab only appointment in approximately 3 to 4 weeks to have your thyroid level rechecked.     Phone call duration:  11 minutes    Toña Beckham PA-C

## 2020-04-24 ENCOUNTER — MYC REFILL (OUTPATIENT)
Dept: FAMILY MEDICINE | Facility: OTHER | Age: 40
End: 2020-04-24

## 2020-04-24 DIAGNOSIS — E03.9 HYPOTHYROIDISM, UNSPECIFIED TYPE: ICD-10-CM

## 2020-04-27 RX ORDER — LEVOTHYROXINE SODIUM 100 UG/1
100 TABLET ORAL DAILY
Qty: 90 TABLET | Refills: 3 | Status: CANCELLED | OUTPATIENT
Start: 2020-04-27

## 2020-04-28 NOTE — TELEPHONE ENCOUNTER
Can we have patient come in for TSH lab visit? I have it as a future order. Then I can refill her on her prescription based on her lab work. Please confirm that her current dosing is 125 mcg of synthroid.    Wilda Solis NP

## 2020-04-29 ENCOUNTER — MYC REFILL (OUTPATIENT)
Dept: FAMILY MEDICINE | Facility: OTHER | Age: 40
End: 2020-04-29

## 2020-04-29 ENCOUNTER — MYC MEDICAL ADVICE (OUTPATIENT)
Dept: FAMILY MEDICINE | Facility: OTHER | Age: 40
End: 2020-04-29

## 2020-04-29 DIAGNOSIS — E03.9 HYPOTHYROIDISM, UNSPECIFIED TYPE: ICD-10-CM

## 2020-04-29 RX ORDER — LEVOTHYROXINE SODIUM 100 UG/1
100 TABLET ORAL DAILY
Qty: 90 TABLET | Refills: 3 | Status: CANCELLED | OUTPATIENT
Start: 2020-04-29

## 2020-04-29 NOTE — TELEPHONE ENCOUNTER
Left message for patient to return call to clinic.  Please see below response per Leda BAILEY.    Zina Whitfield LPN............4/29/2020 9:51 AM

## 2020-04-29 NOTE — TELEPHONE ENCOUNTER
Per Carla encounter on 04/24/20.  Sent refill request to provider and her response was:    Can we have patient come in for TSH lab visit? I have it as a future order. Then I can refill her on her prescription based on her lab work. Please confirm that her current dosing is 125 mcg of synthroid.  Wilda Solis NP    Replying to patient with this message.  Zina Whitfield LPN............4/29/2020 3:23 PM

## 2020-04-30 RX ORDER — LEVOTHYROXINE SODIUM 25 UG/1
25 TABLET ORAL DAILY
Qty: 30 TABLET | Refills: 0 | Status: CANCELLED | OUTPATIENT
Start: 2020-04-30

## 2020-04-30 RX ORDER — LEVOTHYROXINE SODIUM 100 UG/1
100 TABLET ORAL DAILY
Qty: 90 TABLET | Refills: 0 | Status: SHIPPED | OUTPATIENT
Start: 2020-04-30 | End: 2020-09-13

## 2020-05-14 DIAGNOSIS — F33.1 MODERATE EPISODE OF RECURRENT MAJOR DEPRESSIVE DISORDER (H): ICD-10-CM

## 2020-05-18 NOTE — TELEPHONE ENCOUNTER
Refilled Rx.   Please call and help the patient set up a lab appointment to have her thyroid rechecked.   Toña Beckham PA-C ..................5/18/2020 1:07 PM

## 2020-05-18 NOTE — TELEPHONE ENCOUNTER
"Requested Prescriptions   Pending Prescriptions Disp Refills     sertraline (ZOLOFT) 50 MG tablet 90 tablet 3     Sig: Take 1 tablet (50 mg) by mouth daily       SSRIs Protocol Failed - 5/14/2020 12:11 PM        Failed - PHQ-9 score less than 5 in past 6 months     Please review last PHQ-9 score.           Failed - Recent (6 mo) or future (30 days) visit within the authorizing provider's specialty     Patient had office visit in the last 6 months or has a visit in the next 30 days with authorizing provider or within the authorizing provider's specialty.  See \"Patient Info\" tab in inbasket, or \"Choose Columns\" in Meds & Orders section of the refill encounter.            Passed - Medication is active on med list        Passed - Patient is age 18 or older        Passed - No active pregnancy on record        Passed - No positive pregnancy test in last 12 months            Last Written Prescription Date:  06/10/2019  Last Fill Quantity: 90,   # refills: 3  Last Office Visit: 04/17/2020 with Toña Beckahm NP  Future Office visit:  None noted.  Unable to complete prescription refill per RN medication refill policy. Will route to provider for review and consideration.  Amy Barboza RN on 5/18/2020 at 8:25 AM                 "

## 2020-06-01 DIAGNOSIS — E03.9 HYPOTHYROIDISM, UNSPECIFIED TYPE: ICD-10-CM

## 2020-06-02 NOTE — TELEPHONE ENCOUNTER
"Routing refill request to provider for review/approval because:  Labs out of range:  TSH  LOV: 4/17/2020  Per [employer] message on 4/30/2020  \"Hello,  I refilled levothyroxine 100 mcg to the pharmacy.  Please continue taking the 100 mcg plus the 25 mcg tablet for a total of 125 mcg.  Please walk into the clinic in 1 to 2 weeks to have your labs rechecked for monitoring.   Please let me know if you have any questions or concerns.  Warm regards,  Toña Beckham PA-C.......... \"    My chart message sent to remind patient she is due for follow up TSH.  Nuvia Arrieta RN on 6/2/2020 at 8:48 AM              "

## 2020-06-03 RX ORDER — LEVOTHYROXINE SODIUM 25 UG/1
25 TABLET ORAL DAILY
Qty: 30 TABLET | Refills: 0 | Status: SHIPPED | OUTPATIENT
Start: 2020-06-03 | End: 2020-09-13

## 2020-06-03 NOTE — TELEPHONE ENCOUNTER
Please call and notify her that I refilled the medication for 30 days.  Needs to come in for a lab appointment prior to future refills.  Toña Beckham PA-C.......... 6/3/2020 9:12 AM

## 2020-06-27 DIAGNOSIS — E03.9 HYPOTHYROIDISM, UNSPECIFIED TYPE: ICD-10-CM

## 2020-06-29 RX ORDER — LEVOTHYROXINE SODIUM 25 UG/1
TABLET ORAL
Qty: 15 TABLET | Refills: 0 | OUTPATIENT
Start: 2020-06-29

## 2020-06-29 NOTE — TELEPHONE ENCOUNTER
[facility] [place]  sent Rx request for the following:    levothyroxine (SYNTHROID/LEVOTHROID) 25 MCG tablet   Sig:Take 1 tablet (25 mcg) by mouth daily Take with 100 mcg dose for a total of 125 mcg daily. Follow-up with lab work in 6 weeks. - Oral     Last Prescription Date:   06/03/2020  Last Fill Qty/Refills:         30, R-0    Last Office Visit:              04/17/2020-virtual visit   Future Office visit:           none    Routing refill request to provider for review/approval because:    Labs not current:  TSH      Pt has had letters and mychart messages sent, has not followed up with labs or established with a PCP. Attempted to call pt, unable to leave message. Routing to teamlet for review and consideration of refill.        Katharine Escobar RN  ....................  6/29/2020   2:43 PM

## 2020-07-31 NOTE — TELEPHONE ENCOUNTER
I have attempted to contact this patient by phone to return their call or discuss lab results, but there is no response.  Adina Bermudez LPN .............7/31/2020  3:29 PM

## 2020-09-13 ENCOUNTER — MYC REFILL (OUTPATIENT)
Dept: FAMILY MEDICINE | Facility: OTHER | Age: 40
End: 2020-09-13

## 2020-09-13 DIAGNOSIS — E03.9 HYPOTHYROIDISM, UNSPECIFIED TYPE: ICD-10-CM

## 2020-09-15 ENCOUNTER — MYC REFILL (OUTPATIENT)
Dept: FAMILY MEDICINE | Facility: OTHER | Age: 40
End: 2020-09-15

## 2020-09-15 DIAGNOSIS — E03.9 HYPOTHYROIDISM, UNSPECIFIED TYPE: ICD-10-CM

## 2020-09-15 RX ORDER — LEVOTHYROXINE SODIUM 100 UG/1
100 TABLET ORAL DAILY
Qty: 90 TABLET | Refills: 0 | Status: CANCELLED | OUTPATIENT
Start: 2020-09-15

## 2020-09-15 RX ORDER — LEVOTHYROXINE SODIUM 25 UG/1
25 TABLET ORAL DAILY
Qty: 30 TABLET | Refills: 0 | Status: CANCELLED | OUTPATIENT
Start: 2020-09-15

## 2020-09-16 RX ORDER — LEVOTHYROXINE SODIUM 25 UG/1
25 TABLET ORAL DAILY
Qty: 60 TABLET | Refills: 0 | Status: SHIPPED | OUTPATIENT
Start: 2020-09-16 | End: 2020-11-18

## 2020-09-16 RX ORDER — LEVOTHYROXINE SODIUM 100 UG/1
100 TABLET ORAL DAILY
Qty: 60 TABLET | Refills: 0 | Status: SHIPPED | OUTPATIENT
Start: 2020-09-16 | End: 2020-11-18

## 2020-09-16 NOTE — TELEPHONE ENCOUNTER
Reviewed other telephone encounter.  Patient has lab appointment set up tomorrow.  Toña Beckham PA-C.......... 9/16/2020 2:18 PM

## 2020-09-16 NOTE — TELEPHONE ENCOUNTER
Routing refill request to provider for review/approval because:  Labs out of range:  TSH  Labs not current:  TSH    LOV: 4/17/2020    Nuvia Arrieta RN on 9/16/2020 at 8:13 AM

## 2020-09-17 DIAGNOSIS — E03.9 HYPOTHYROIDISM, UNSPECIFIED TYPE: ICD-10-CM

## 2020-09-17 LAB — TSH SERPL DL<=0.05 MIU/L-ACNC: 3.76 IU/ML (ref 0.34–5.6)

## 2020-09-17 PROCEDURE — 36415 COLL VENOUS BLD VENIPUNCTURE: CPT | Mod: ZL | Performed by: NURSE PRACTITIONER

## 2020-09-17 PROCEDURE — 84443 ASSAY THYROID STIM HORMONE: CPT | Mod: ZL | Performed by: NURSE PRACTITIONER

## 2020-09-17 NOTE — TELEPHONE ENCOUNTER
Both Levothyroxine 100mcg and 25mcg refilled on 9/16/2020 to [facility].    Nuvia Arrieta RN on 9/17/2020 at 7:52 AM

## 2020-10-04 ENCOUNTER — MYC MEDICAL ADVICE (OUTPATIENT)
Dept: FAMILY MEDICINE | Facility: OTHER | Age: 40
End: 2020-10-04

## 2020-10-04 DIAGNOSIS — E28.2 PCOS (POLYCYSTIC OVARIAN SYNDROME): Primary | ICD-10-CM

## 2020-10-05 NOTE — TELEPHONE ENCOUNTER
[facility] sent Rx request for the following:      Requested Prescriptions   Pending Prescriptions Disp Refills   metFORMIN (GLUCOPHAGE) 500 MG tablet [Pharmacy Med Name: METFORMIN 500MG TABLETS] 90 tablet 1    Sig: TAKE 1 TABLET(500 MG) BY MOUTH DAILY WITH BREAKFAST   Last Prescription Date:   4/17/20  Last Fill Qty/Refills:         90, R-1    Last Office Visit:              4/17/20  Future Office visit:           None  Routing refill request to provider for review/approval because:   Biguanide Agents Failed - 10/5/2020  2:48 PM       Failed - Patient has documented A1c within the specified period of time.    If HgbA1C is 8 or greater, it needs to be on file within the past 3 months.  If less than 8, must be on file within the past 6 months.     No lab results found.         Failed - Patient's CR is NOT>1.4 OR Patient's EGFR is NOT<45 within past 12 mos.     Unable to complete prescription refill per RN Medication Refill Policy. Zina Kim RN .............. 10/5/2020  2:50 PM

## 2020-10-05 NOTE — TELEPHONE ENCOUNTER
It looks like this message is for FREIDA Aranda.  I have not seen patient in a few years.  Please find out who PCP is but in the meantime, it looks like the last TSH was ordered by Wilda Solis who is no longer here.  TSH was normal.

## 2020-11-18 DIAGNOSIS — E03.9 HYPOTHYROIDISM, UNSPECIFIED TYPE: ICD-10-CM

## 2020-11-18 RX ORDER — LEVOTHYROXINE SODIUM 100 UG/1
100 TABLET ORAL DAILY
Qty: 60 TABLET | Refills: 1 | Status: SHIPPED | OUTPATIENT
Start: 2020-11-18 | End: 2021-03-09

## 2020-11-18 RX ORDER — LEVOTHYROXINE SODIUM 25 UG/1
25 TABLET ORAL DAILY
Qty: 60 TABLET | Refills: 1 | Status: SHIPPED | OUTPATIENT
Start: 2020-11-18 | End: 2021-04-08

## 2020-11-18 NOTE — TELEPHONE ENCOUNTER
[facility] [place] sent Rx request for the following:   levothyroxine (SYNTHROID/LEVOTHROID) 25 MCG tablet  Sig:  Take 1 tablet (25 mcg) by mouth daily Take with 100 mcg dose for a total of 125 mcg daily.  Last Prescription Date:   9/16/2020  Last Fill Qty/Refills:         60, R-0        levothyroxine (SYNTHROID/LEVOTHROID) 25 MCG tablet  Sig: Take 1 tablet (100 mcg) by mouth daily    Last Prescription Date:   9/16/2020  Last Fill Qty/Refills:         60, R-0    Last Office Visit:              4/17/2020   Future Office visit:           None noted     Thyroid Protocol Passed - 11/18/2020  3:16 PM     Prescription approved per [facility] Refill Protocol.  Nona Belel RN ....................  11/18/2020   4:46 PM

## 2020-11-20 DIAGNOSIS — F33.1 MODERATE EPISODE OF RECURRENT MAJOR DEPRESSIVE DISORDER (H): ICD-10-CM

## 2020-11-20 NOTE — LETTER
November 23, 2020      Hodan Iniguez  [address]  [place] [address]        Dear Hodan,     A refill request was received from your pharmacy for Zoloft.    Additional refills require an office visit with a primary care provider here at [facility] to Establish care and 6 month follow up.    Please call [phone] to schedule appointment.          Sincerely,        Refill Nurse

## 2020-11-23 NOTE — TELEPHONE ENCOUNTER
Routing refill request to provider for review/approval because:   PHQ-9 score less than 5 in past 6 months Protocol Details    Recent (6 mo) or future (30 days) visit within the authorizing provider's specialty      LOV: 4/17/2020  Letter sent patient is due for 6 month follow up  Letter and my chart message sent.  Nuvia Arrieta RN on 11/23/2020 at 10:04 AM

## 2020-12-27 ENCOUNTER — HEALTH MAINTENANCE LETTER (OUTPATIENT)
Age: 40
End: 2020-12-27

## 2021-01-04 DIAGNOSIS — E28.2 PCOS (POLYCYSTIC OVARIAN SYNDROME): ICD-10-CM

## 2021-01-05 NOTE — TELEPHONE ENCOUNTER
Refilled medication.  Needs to be seen in clinic for recheck along with lab work prior to future refills.  Toña Beckham PA-C.......... 1/5/2021 2:27 PM

## 2021-01-05 NOTE — TELEPHONE ENCOUNTER
" Disp Refills Start End NARESH   metFORMIN (GLUCOPHAGE) 500 MG tablet 90 tablet 0 10/5/2020  No   Sig: TAKE 1 TABLET(500 MG) BY MOUTH DAILY WITH BREAKFAST       LOV: 4/17/2020  Future Office visit:  No appointment scheduled at this time. Patient was to set up an appointment and establish with a provider.      Made attempt to contact patient via phone to schedule an appointment. Unsure if patient is still living in the area as pharmacy is from [place]. Left message waiting on call back as patient is overdue for an office visit and labs.      Routing refill request to provider for review/approval because:  Failed protocol    Requested Prescriptions   Pending Prescriptions Disp Refills     metFORMIN (GLUCOPHAGE) 500 MG tablet 90 tablet 0       Biguanide Agents Failed - 1/4/2021  1:54 PM        Failed - Patient has documented A1c within the specified period of time.     If HgbA1C is 8 or greater, it needs to be on file within the past 3 months.  If less than 8, must be on file within the past 6 months.     No lab results found.          Failed - Patient's CR is NOT>1.4 OR Patient's EGFR is NOT<45 within past 12 mos.     Recent Labs   Lab Test 05/06/19  1614   GFRESTIMATED 83   GFRESTBLACK >90       Recent Labs   Lab Test 05/06/19  1614   CR 0.77             Failed - Recent (6 mo) or future (30 days) visit within the authorizing provider's specialty     Patient had office visit in the last 6 months or has a visit in the next 30 days with authorizing provider or within the authorizing provider's specialty.  See \"Patient Info\" tab in inbasket, or \"Choose Columns\" in Meds & Orders section of the refill encounter.            Passed - Patient is age 10 or older        Passed - Patient does NOT have a diagnosis of CHF.        Passed - Medication is active on med list        Passed - Patient is not pregnant        Passed - Patient has not had a positive pregnancy test within the past 12 mos.            "

## 2021-02-08 DIAGNOSIS — F33.1 MODERATE EPISODE OF RECURRENT MAJOR DEPRESSIVE DISORDER (H): Primary | ICD-10-CM

## 2021-02-09 NOTE — TELEPHONE ENCOUNTER
[facility] [place] [place] sent Rx request for the following:      Requested Prescriptions   Pending Prescriptions Disp Refills   sertraline (ZOLOFT) 50 MG tablet 90 tablet 0    Sig: Take 1 tablet (50 mg) by mouth daily   Last Prescription Date:   11/23/20  Last Fill Qty/Refills:         90, R-0  Last Office Visit:              4/17/20  Future Office visit:           None  Routing refill request to provider for review/approval because:   SSRIs Protocol Failed - 2/9/2021  9:06 AM       Failed - PHQ-9 score less than 5 in past 6 months       Failed - Recent (6 mo) or future (30 days) visit within the authorizing provider's specialty     Per refill protocol, Pt was due for 6-month follow up around 10/17/20. Letter and MyChart message sent to Pt on 11/20/20. Per refill encounter, dated 1/4/21, Pt needs to be seen in clinic for recheck along with lab work prior to refills (of other medication - Metformin). Attempted reaching Pt, to assist her in scheduling appointment. Left message on machine to call back. Zina Kim RN .............. 2/9/2021  9:56 AM

## 2021-02-10 NOTE — TELEPHONE ENCOUNTER
Unable to reach Pt. Routing to Toña Beckham for refill refusal/approval consideration. Zina Kim RN .............. 2/10/2021  12:47 PM

## 2021-03-08 DIAGNOSIS — E03.9 HYPOTHYROIDISM, UNSPECIFIED TYPE: ICD-10-CM

## 2021-03-08 NOTE — LETTER
March 9, 2021      Hodan Iniguez  [address]  [place] [address]        Dear Hodan,       A refill of Synthroid has been requested by your pharmacy and it appears you are a former patient of Wilda Solis.  As Wilda Solis is no longer with the clinic, we ask that you please contact [facility] for assistance in scheduling a medication management appointment with another one of our providers.    The refill request for this medication can then be discussed and addressed accordingly with your new primary care physician.  Your health is very important to us.  Please call the clinic at [phone] to schedule your appointment.    A limited michael refill has been sent to your pharmacy to allow time to schedule your appointment.      Thank you for choosing [facility] and [facility] for your health care needs.    Sincerely,    Refill RN  [facility]

## 2021-03-09 RX ORDER — LEVOTHYROXINE SODIUM 100 UG/1
100 TABLET ORAL DAILY
Qty: 90 TABLET | Refills: 0 | Status: SHIPPED | OUTPATIENT
Start: 2021-03-09 | End: 2021-04-08

## 2021-03-09 NOTE — TELEPHONE ENCOUNTER
[facility] sent Rx request for the following:      levothyroxine (SYNTHROID/LEVOTHROID) 100 MCG tablet  Sig: Take 1 tablet (100 mcg) by mouth daily      Last Prescription Date:   11/18/2020  Last Fill Qty/Refills:         60, R-1    Last Office Visit:              4/17/2020   Future Office visit:           none    Reminder letter sent to patient to schedule annual appt and establish care with a new PCP. Will give 90 day michael refill. Michael Crump RN, BSN  ....................  12/8/2020   1:21 PM

## 2021-04-01 DIAGNOSIS — E28.2 PCOS (POLYCYSTIC OVARIAN SYNDROME): ICD-10-CM

## 2021-04-01 NOTE — TELEPHONE ENCOUNTER
"[facility] [place] sent Rx request for the following:      metFORMIN (GLUCOPHAGE) 500 MG tablet  Sig: TAKE 1 TABLET(500 MG) BY MOUTH DAILY WITH BREAKFAST      Last Prescription Date:   1/5/2021  Last Fill Qty/Refills:         90, R-0    Last Office Visit:              4/17/2020   Future Office visit:           none    Attempted contact with patient to request that she schedule an appt with Toña Beckham to establish care in order to get med refills. Patient has been contacted multiple times and has not yet scheduled an appointment. Medications are also being requested at a pharmacy out of state. Will send to a provider for review.    Requested Prescriptions   Pending Prescriptions Disp Refills     metFORMIN (GLUCOPHAGE) 500 MG tablet 90 tablet 0     Sig: TAKE 1 TABLET(500 MG) BY MOUTH DAILY WITH BREAKFAST       Biguanide Agents Failed - 4/1/2021 10:32 AM        Failed - Patient has documented A1c within the specified period of time.     If HgbA1C is 8 or greater, it needs to be on file within the past 3 months.  If less than 8, must be on file within the past 6 months.     No lab results found.          Failed - Patient's CR is NOT>1.4 OR Patient's EGFR is NOT<45 within past 12 mos.     Recent Labs   Lab Test 05/06/19  1614   GFRESTIMATED 83   GFRESTBLACK >90       Recent Labs   Lab Test 05/06/19  1614   CR 0.77             Failed - Recent (6 mo) or future (30 days) visit within the authorizing provider's specialty     Patient had office visit in the last 6 months or has a visit in the next 30 days with authorizing provider or within the authorizing provider's specialty.  See \"Patient Info\" tab in inbasket, or \"Choose Columns\" in Meds & Orders section of the refill encounter.            Passed - Patient is age 10 or older        Passed - Patient does NOT have a diagnosis of CHF.        Passed - Medication is active on med list        Passed - Patient is not pregnant        Passed - Patient has not had a positive " pregnancy test within the past 12 mos.            In clinical absence of Toña Beckham,  this message will be sent to the primary provider's Teamlet for consideration.        Unable to complete prescription refill per RN Medication Refill Policy.................... Michael Crump RN ....................  4/1/2021   10:49 AM

## 2021-04-06 ENCOUNTER — IMMUNIZATION (OUTPATIENT)
Dept: FAMILY MEDICINE | Facility: OTHER | Age: 41
End: 2021-04-06
Attending: FAMILY MEDICINE
Payer: COMMERCIAL

## 2021-04-06 PROCEDURE — 91303 PR COVID VAC JANSSEN AD26 0.5ML: CPT

## 2021-04-08 ENCOUNTER — OFFICE VISIT (OUTPATIENT)
Dept: FAMILY MEDICINE | Facility: OTHER | Age: 41
End: 2021-04-08
Attending: PHYSICIAN ASSISTANT
Payer: COMMERCIAL

## 2021-04-08 VITALS
WEIGHT: 216.4 LBS | RESPIRATION RATE: 18 BRPM | DIASTOLIC BLOOD PRESSURE: 84 MMHG | TEMPERATURE: 98.5 F | SYSTOLIC BLOOD PRESSURE: 132 MMHG | BODY MASS INDEX: 37.14 KG/M2 | HEART RATE: 84 BPM

## 2021-04-08 DIAGNOSIS — E78.2 MIXED HYPERLIPIDEMIA: ICD-10-CM

## 2021-04-08 DIAGNOSIS — E28.2 PCOS (POLYCYSTIC OVARIAN SYNDROME): ICD-10-CM

## 2021-04-08 DIAGNOSIS — F33.1 MODERATE EPISODE OF RECURRENT MAJOR DEPRESSIVE DISORDER (H): ICD-10-CM

## 2021-04-08 DIAGNOSIS — E03.9 HYPOTHYROIDISM, UNSPECIFIED TYPE: Primary | ICD-10-CM

## 2021-04-08 LAB
ANION GAP SERPL CALCULATED.3IONS-SCNC: 7 MMOL/L (ref 3–14)
BUN SERPL-MCNC: 20 MG/DL (ref 7–25)
CALCIUM SERPL-MCNC: 9 MG/DL (ref 8.6–10.3)
CHLORIDE SERPL-SCNC: 107 MMOL/L (ref 98–107)
CHOLEST SERPL-MCNC: 188 MG/DL
CO2 SERPL-SCNC: 24 MMOL/L (ref 21–31)
CREAT SERPL-MCNC: 0.72 MG/DL (ref 0.6–1.2)
GFR SERPL CREATININE-BSD FRML MDRD: 89 ML/MIN/{1.73_M2}
GLUCOSE SERPL-MCNC: 100 MG/DL (ref 70–105)
HBA1C MFR BLD: 5.4 % (ref 4–6)
HDLC SERPL-MCNC: 51 MG/DL (ref 23–92)
LDLC SERPL CALC-MCNC: 113 MG/DL
NONHDLC SERPL-MCNC: 137 MG/DL
POTASSIUM SERPL-SCNC: 3.8 MMOL/L (ref 3.5–5.1)
SODIUM SERPL-SCNC: 138 MMOL/L (ref 134–144)
TRIGL SERPL-MCNC: 120 MG/DL
TSH SERPL DL<=0.05 MIU/L-ACNC: 2.9 IU/ML (ref 0.34–5.6)

## 2021-04-08 PROCEDURE — 83036 HEMOGLOBIN GLYCOSYLATED A1C: CPT | Mod: ZL | Performed by: PHYSICIAN ASSISTANT

## 2021-04-08 PROCEDURE — 80048 BASIC METABOLIC PNL TOTAL CA: CPT | Mod: ZL | Performed by: PHYSICIAN ASSISTANT

## 2021-04-08 PROCEDURE — G0463 HOSPITAL OUTPT CLINIC VISIT: HCPCS

## 2021-04-08 PROCEDURE — 80061 LIPID PANEL: CPT | Mod: ZL | Performed by: PHYSICIAN ASSISTANT

## 2021-04-08 PROCEDURE — 99214 OFFICE O/P EST MOD 30 MIN: CPT | Performed by: PHYSICIAN ASSISTANT

## 2021-04-08 PROCEDURE — 36415 COLL VENOUS BLD VENIPUNCTURE: CPT | Mod: ZL | Performed by: PHYSICIAN ASSISTANT

## 2021-04-08 PROCEDURE — 84443 ASSAY THYROID STIM HORMONE: CPT | Mod: ZL | Performed by: PHYSICIAN ASSISTANT

## 2021-04-08 RX ORDER — LEVOTHYROXINE SODIUM 125 UG/1
125 TABLET ORAL DAILY
Qty: 90 TABLET | Refills: 0 | Status: SHIPPED | OUTPATIENT
Start: 2021-04-08 | End: 2021-07-12

## 2021-04-08 RX ORDER — SERTRALINE HYDROCHLORIDE 100 MG/1
100 TABLET, FILM COATED ORAL DAILY
Qty: 90 TABLET | Refills: 3 | Status: SHIPPED | OUTPATIENT
Start: 2021-04-08 | End: 2021-09-17

## 2021-04-08 ASSESSMENT — PATIENT HEALTH QUESTIONNAIRE - PHQ9
5. POOR APPETITE OR OVEREATING: MORE THAN HALF THE DAYS
SUM OF ALL RESPONSES TO PHQ QUESTIONS 1-9: 9

## 2021-04-08 ASSESSMENT — ANXIETY QUESTIONNAIRES
5. BEING SO RESTLESS THAT IT IS HARD TO SIT STILL: MORE THAN HALF THE DAYS
7. FEELING AFRAID AS IF SOMETHING AWFUL MIGHT HAPPEN: MORE THAN HALF THE DAYS
1. FEELING NERVOUS, ANXIOUS, OR ON EDGE: NEARLY EVERY DAY
2. NOT BEING ABLE TO STOP OR CONTROL WORRYING: MORE THAN HALF THE DAYS
3. WORRYING TOO MUCH ABOUT DIFFERENT THINGS: MORE THAN HALF THE DAYS
GAD7 TOTAL SCORE: 15
6. BECOMING EASILY ANNOYED OR IRRITABLE: MORE THAN HALF THE DAYS

## 2021-04-08 NOTE — PROGRESS NOTES
Nursing Notes:   Jeanette Vargas LPN  4/8/2021  3:06 PM  Signed  Chief Complaint   Patient presents with     Medication Therapy Management     Headache         Medication Reconciliation: complete    Jeanette Vargas LPN        HPI:    Hodan Iniguez is a 40 year old female who presents for medication check.   Depression: Patient is wondering about increasing her sertraline dose.  Currently taking 50 mg daily.  Her cat passed away this past year.  She has had a lot of increased pressors.  Increased depression.  Symptoms are worsening lately.  Feels tired and fatigued.  No suicidal or homicidal ideation.  Keeping food and fluids down.  No dehydration concerns.    PCOS: Patient is currently taking Metformin 500 mg daily in order to help decrease her hair growth.  She has not noticed a difference.  Wondering about increasing the dose to help better treat symptoms.  Her periods tend to shift with her thyroid medication.  Has occasional cramps.  Gets some cramping/bleeding with her menstrual cycles.  No STD concerns.  Not lightheaded or dizzy.  Wondering about getting her thyroid level rechecked.  She has been tired lately.  Sleeping all day.  Wondering if her mid dose is off.  No chest pain, palpitations, problems breathing, cough or cold symptoms, fevers, chills.    Hyperlipidemia: Patient has history of hyperlipidemia that is diet controlled.  Wondering about getting her labs rechecked for monitoring.    Past Medical History:   Diagnosis Date     Anxiety disorder     No Comments Provided     Gastro-esophageal reflux disease without esophagitis     No Comments Provided     Hypothyroidism     No Comments Provided     Migraine        Past Surgical History:   Procedure Laterality Date     CHOLECYSTECTOMY      No Comments Provided       Family History   Problem Relation Age of Onset     Prostate Cancer Father         Cancer-prostate     Hypertension Father         Hypertension     Hypertension Mother          Hypertension     Mental Illness Mother         Mental illness,sees a therapist     Infertility Sister      Thyroid Disease Sister      Infertility Sister      Thyroid Disease Sister      Cancer Paternal Grandmother 80        Cancer,lung cancer smoker     Mental Illness Brother         Mental illness,anger, therapy       Social History     Tobacco Use     Smoking status: Never Smoker     Smokeless tobacco: Never Used   Substance Use Topics     Alcohol use: Never     Alcohol/week: 0.0 standard drinks     Frequency: Monthly or less     Drinks per session: 1 or 2     Binge frequency: Less than monthly     Comment: Alcoholic Drinks/day: social       Current Outpatient Medications   Medication Sig Dispense Refill     levothyroxine (SYNTHROID/LEVOTHROID) 125 MCG tablet Take 1 tablet (125 mcg) by mouth daily 90 tablet 0     metFORMIN (GLUCOPHAGE) 500 MG tablet TAKE 1 TABLET(500 MG) BY MOUTH BID with meals 180 tablet 3     sertraline (ZOLOFT) 100 MG tablet Take 1 tablet (100 mg) by mouth daily 90 tablet 3       No Known Allergies    REVIEW OF SYSTEMS:  Refer to HPI.    EXAM:   Vitals:    /84 (BP Location: Right arm, Patient Position: Sitting, Cuff Size: Adult Large)   Pulse 84   Temp 98.5  F (36.9  C)   Resp 18   Wt 98.2 kg (216 lb 6.4 oz)   BMI 37.14 kg/m      General Appearance: Pleasant, alert, appropriate appearance for age. No acute distress  Neck Exam:  Supple, no masses or nodes.  Thyroid Exam: No nodules or enlargement.  Chest/Respiratory Exam: Normal chest wall and respirations. Clear to auscultation.  Cardiovascular Exam: Regular rate and rhythm. S1, S2, no murmur, click, gallop, or rubs.  Skin: no rash or abnormalities  Neurologic Exam: Nonfocal, normal gross motor, tone coordination and no tremor.  Psychiatric Exam: Alert and oriented - appropriate affect.    PHQ Depression Screen  PHQ-9 SCORE 6/19/2018 5/6/2019 4/8/2021   PHQ-9 Total Score 13 7 9     Labs:   Results for orders placed or performed in  visit on 04/08/21   Hemoglobin A1c     Status: None   Result Value Ref Range    Hemoglobin A1C 5.4 4.0 - 6.0 %   Lipid Panel     Status: Abnormal   Result Value Ref Range    Cholesterol 188 <200 mg/dL    Triglycerides 120 <150 mg/dL    HDL Cholesterol 51 23 - 92 mg/dL    LDL Cholesterol Calculated 113 (H) <100 mg/dL    Non HDL Cholesterol 137 (H) <130 mg/dL   Basic Metabolic Panel     Status: None   Result Value Ref Range    Sodium 138 134 - 144 mmol/L    Potassium 3.8 3.5 - 5.1 mmol/L    Chloride 107 98 - 107 mmol/L    Carbon Dioxide 24 21 - 31 mmol/L    Anion Gap 7 3 - 14 mmol/L    Glucose 100 70 - 105 mg/dL    Urea Nitrogen 20 7 - 25 mg/dL    Creatinine 0.72 0.60 - 1.20 mg/dL    GFR Estimate 89 >60 mL/min/[1.73_m2]    GFR Estimate If Black >90 >60 mL/min/[1.73_m2]    Calcium 9.0 8.6 - 10.3 mg/dL   TSH     Status: None   Result Value Ref Range    Thyrotropin 2.90 0.34 - 5.60 IU/mL       ASSESSMENT AND PLAN:      ICD-10-CM    1. Hypothyroidism, unspecified type  E03.9 TSH     levothyroxine (SYNTHROID/LEVOTHROID) 125 MCG tablet     TSH   2. PCOS (polycystic ovarian syndrome)  E28.2 Basic Metabolic Panel     Hemoglobin A1c     metFORMIN (GLUCOPHAGE) 500 MG tablet     Hemoglobin A1c     Basic Metabolic Panel   3. Mixed hyperlipidemia  E78.2 Lipid Panel     Lipid Panel   4. Moderate episode of recurrent major depressive disorder (H)  F33.1 sertraline (ZOLOFT) 100 MG tablet     Hypothyroidism: Completed TSH for monitoring.  Labs stable.  Refill levothyroxine 125 mcg daily.    PCOS:  Increase metformin to 500 mg twice daily.  No acute concerns at this time.  Completed hemoglobin A1c and BMP for monitoring.  Labs are stable.    Depression:  Started on sertraline 100 mg.  Gave side effect profile.  Encouraged good diet and exercise.   Encouraged to see a counselor.   Return in 4-6 weeks for recheck.   Return to clinic with change/worsening of symptoms.     Hyperlipidemia: Complete lipid panel for monitoring.  Lab is  stable.  No medications are warranted at this time.  Encourage good diet and exercise.  Encouraged weight loss.    30 minutes spent on the date of the encounter doing chart review, history and exam, documentation and further activities as noted above        Patient Instructions     Recheck thyroid in 2 months with a lab only appointment.     Increase metformin to 500 mg twice daily.     Started on sertraline 100 mg.  Encouraged good diet and exercise.   Encouraged to see a counselor.   Return in 4-6 weeks for recheck.   Return to clinic with change/worsening of symptoms.       Suicide Emergency First call for help:  [phone]    Depression: Tips to Help Yourself  As your healthcare providers help treat your depression, you can also help yourself. Keep in mind that your illness affects you emotionally, physically, mentally, and socially. So full recovery will take time. Take care of your body and your soul, and be patient with yourself as you get better.    Self-care    Educate yourself. Read about treatment and medicine options. If you have the energy, attend local conferences or support groups. Keep a list of useful websites and helpful books and use them as needed. This illness is not your fault. Don t blame yourself for your depression.    Manage early symptoms. If you notice symptoms returning, experience triggers, or identify other factors that may lead to a depressive episode, get help as soon as possible. Ask trusted friends and family to monitor your behavior and let you know if they see anything of concern.    Work with your provider. Find a provider you can trust. Communicate honestly with that person and share information on your treatment for depression and your reaction to medicines.    Be prepared for a crisis. Know what to do if you experience a crisis. Keep the phone number of a crisis hotline and know the location of your community's urgent care centers and the closest emergency  department.    Hold off on big decisions. Depression can cloud your judgment. So wait until you feel better before making major life decisions, such as changing jobs, moving, or getting  or .    Be patient. Recovering from depression is a process. Don t be discouraged if it takes some time to feel better.    Keep it simple. Depression saps your energy and concentration. So you won t be able to do all the things you used to do. Set small goals and do what you can.    Be with others. Don t isolate yourself--you ll only feel worse. Try to be with other people. And take part in fun activities when you can. Go to a movie, [employer]game, Worship service, or social event. Talk openly with people you can trust. And accept help when it s offered.  Take care of your body  People with depression often lose the desire to take care of themselves. That only makes their problems worse. During treatment and afterward, make a point to:    Exercise. It s a great way to take care of your body. And studies have shown that exercise helps fight depression.    Avoid drugs and alcohol. These may ease the pain in the short term. But they ll only make your problems worse in the long run.    Get relief from stress. Ask your healthcare provider for relaxation exercises and techniques to help relieve stress.    Eat right. A balanced and healthy diet helps keep your body healthy.  Date Last Reviewed: 1/1/2017 2000-2017 [employer]. [address] [place] [id number]. All rights reserved. This information is not intended as a substitute for professional medical care. Always follow your healthcare professional's instructions.         Treating Anxiety Disorders with Therapy    If you have an anxiety disorder, you don t have to suffer anymore. Treatment is available. Therapy (also called counseling) is often a helpful treatment for anxiety disorders. With therapy, a specially trained professional (therapist) helps  you face and learn to manage your anxiety. Therapy can be short-term or long-term depending on your needs. In some cases, medicine may also be prescribed with therapy. It may take time before you notice how much therapy is helping, but stick with it. With therapy, you can feel better.  Cognitive behavioral therapy (CBT)  Cognitive behavioral therapy (CBT) teaches you to manage anxiety. It does this by helping you understand how you think and act when you re anxious. Research has shown CBT to be a very effective treatment for anxiety disorders. How CBT is run is almost like a class. It involves homework and activities to build skills that teach you to cope with anxiety step by step. It can be done in a group or one-on-one, and often takes place for a set number of sessions. CBT has two main parts:    Cognitive therapy helps you identify the negative, irrational thoughts that occur with your anxiety. You ll learn to replace these with more positive, realistic thoughts.    Behavioral therapy helps you change how you react to anxiety. You ll learn coping skills and methods for relaxing to help you better deal with anxiety.  Other forms of therapy  Other therapy methods may work better for you than CBT. Or, you may move from CBT to another form of therapy as your treatment needs change. This may mean meeting with a therapist by yourself or in a group. Therapy can also help you work through problems in your life, such as drug or alcohol dependence, that may be making your anxiety worse.  Getting better takes time  Therapy will help you feel better and teach you skills to help manage anxiety long term. But change doesn t happen right away. It takes a commitment from you. And treatment only works if you learn to face the causes of your anxiety. So, you might feel worse before you feel better. This can sometimes make it hard to stick with it. But remember: Therapy is a very effective treatment. The results will be well worth  it.  Helping yourself  If anxiety is wearing you down, here are some things you can do to cope:    Check with your doctor and rule out any physical problems that may be causing the anxiety symptoms.    If an anxiety disorder is diagnosed, seek mental healthcare. This is an illness and it can respond to treatment. Most types of anxiety disorders will respond to talk therapy and medicine.    Educate yourself about anxiety disorders. Keep track of helpful online resources and books you can use during stressful periods.    Try stress management techniques such as meditation.    Consider online or in-person support groups.    Don t fight your feelings. Anxiety feeds itself. The more you worry about it, the worse it gets. Instead, try to identify what might have triggered your anxiety. Then try to put this threat in perspective.    Keep in mind that you can t control everything about a situation. Change what you can and let the rest take its course.    Exercise -- it s a great way to relieve tension and help your body feel relaxed.    Examine your life for stress, and try to find ways to reduce it.    Avoid caffeine and nicotine, which can make anxiety symptoms worse.    Fight the temptation to turn to alcohol or unprescribed drugs for relief. They only make things worse in the long run.   Date Last Reviewed: 1/1/2017 2000-2017 [employer]. [address] [place] [address], [place] [address]. All rights reserved. This information is not intended as a substitute for professional medical care. Always follow your healthcare professional's instructions.               Toña Beckham PA-C PA-C..................4/8/2021 3:05 PM

## 2021-04-08 NOTE — PATIENT INSTRUCTIONS
Recheck thyroid in 2 months with a lab only appointment.     Increase metformin to 500 mg twice daily.     Started on sertraline 100 mg.  Encouraged good diet and exercise.   Encouraged to see a counselor.   Return in 4-6 weeks for recheck.   Return to clinic with change/worsening of symptoms.       Suicide Emergency First call for help:  [phone]    Depression: Tips to Help Yourself  As your healthcare providers help treat your depression, you can also help yourself. Keep in mind that your illness affects you emotionally, physically, mentally, and socially. So full recovery will take time. Take care of your body and your soul, and be patient with yourself as you get better.    Self-care    Educate yourself. Read about treatment and medicine options. If you have the energy, attend local conferences or support groups. Keep a list of useful websites and helpful books and use them as needed. This illness is not your fault. Don t blame yourself for your depression.    Manage early symptoms. If you notice symptoms returning, experience triggers, or identify other factors that may lead to a depressive episode, get help as soon as possible. Ask trusted friends and family to monitor your behavior and let you know if they see anything of concern.    Work with your provider. Find a provider you can trust. Communicate honestly with that person and share information on your treatment for depression and your reaction to medicines.    Be prepared for a crisis. Know what to do if you experience a crisis. Keep the phone number of a crisis hotline and know the location of your community's urgent care centers and the closest emergency department.    Hold off on big decisions. Depression can cloud your judgment. So wait until you feel better before making major life decisions, such as changing jobs, moving, or getting  or .    Be patient. Recovering from depression is a process. Don t be discouraged  if it takes some time to feel better.    Keep it simple. Depression saps your energy and concentration. So you won t be able to do all the things you used to do. Set small goals and do what you can.    Be with others. Don t isolate yourself--you ll only feel worse. Try to be with other people. And take part in fun activities when you can. Go to a movie, ballgame, [employer] service, or social event. Talk openly with people you can trust. And accept help when it s offered.  Take care of your body  People with depression often lose the desire to take care of themselves. That only makes their problems worse. During treatment and afterward, make a point to:    Exercise. It s a great way to take care of your body. And studies have shown that exercise helps fight depression.    Avoid drugs and alcohol. These may ease the pain in the short term. But they ll only make your problems worse in the long run.    Get relief from stress. Ask your healthcare provider for relaxation exercises and techniques to help relieve stress.    Eat right. A balanced and healthy diet helps keep your body healthy.  Date Last Reviewed: 1/1/2017 2000-2017 [employer]. [address] [place], [place] [id number]. All rights reserved. This information is not intended as a substitute for professional medical care. Always follow your healthcare professional's instructions.         Treating Anxiety Disorders with Therapy    If you have an anxiety disorder, you don t have to suffer anymore. Treatment is available. Therapy (also called counseling) is often a helpful treatment for anxiety disorders. With therapy, a specially trained professional (therapist) helps you face and learn to manage your anxiety. Therapy can be short-term or long-term depending on your needs. In some cases, medicine may also be prescribed with therapy. It may take time before you notice how much therapy is helping, but stick with it. With therapy, you can feel  better.  Cognitive behavioral therapy (CBT)  Cognitive behavioral therapy (CBT) teaches you to manage anxiety. It does this by helping you understand how you think and act when you re anxious. Research has shown CBT to be a very effective treatment for anxiety disorders. How CBT is run is almost like a class. It involves homework and activities to build skills that teach you to cope with anxiety step by step. It can be done in a group or one-on-one, and often takes place for a set number of sessions. CBT has two main parts:    Cognitive therapy helps you identify the negative, irrational thoughts that occur with your anxiety. You ll learn to replace these with more positive, realistic thoughts.    Behavioral therapy helps you change how you react to anxiety. You ll learn coping skills and methods for relaxing to help you better deal with anxiety.  Other forms of therapy  Other therapy methods may work better for you than CBT. Or, you may move from CBT to another form of therapy as your treatment needs change. This may mean meeting with a therapist by yourself or in a group. Therapy can also help you work through problems in your life, such as drug or alcohol dependence, that may be making your anxiety worse.  Getting better takes time  Therapy will help you feel better and teach you skills to help manage anxiety long term. But change doesn t happen right away. It takes a commitment from you. And treatment only works if you learn to face the causes of your anxiety. So, you might feel worse before you feel better. This can sometimes make it hard to stick with it. But remember: Therapy is a very effective treatment. The results will be well worth it.  Helping yourself  If anxiety is wearing you down, here are some things you can do to cope:    Check with your doctor and rule out any physical problems that may be causing the anxiety symptoms.    If an anxiety disorder is diagnosed, seek mental healthcare. This is an  illness and it can respond to treatment. Most types of anxiety disorders will respond to talk therapy and medicine.    Educate yourself about anxiety disorders. Keep track of helpful online resources and books you can use during stressful periods.    Try stress management techniques such as meditation.    Consider online or in-person support groups.    Don t fight your feelings. Anxiety feeds itself. The more you worry about it, the worse it gets. Instead, try to identify what might have triggered your anxiety. Then try to put this threat in perspective.    Keep in mind that you can t control everything about a situation. Change what you can and let the rest take its course.    Exercise -- it s a great way to relieve tension and help your body feel relaxed.    Examine your life for stress, and try to find ways to reduce it.    Avoid caffeine and nicotine, which can make anxiety symptoms worse.    Fight the temptation to turn to alcohol or unprescribed drugs for relief. They only make things worse in the long run.   Date Last Reviewed: 1/1/2017 2000-2017 [employer]. [address] [place] [address], [place] [address]. All rights reserved. This information is not intended as a substitute for professional medical care. Always follow your healthcare professional's instructions.

## 2021-04-08 NOTE — NURSING NOTE
Chief Complaint   Patient presents with     Medication Therapy Management     Headache         Medication Reconciliation: complete    Jeanette Vargas, LPN

## 2021-04-09 ASSESSMENT — ANXIETY QUESTIONNAIRES: GAD7 TOTAL SCORE: 15

## 2021-05-06 ENCOUNTER — VIRTUAL VISIT (OUTPATIENT)
Dept: FAMILY MEDICINE | Facility: OTHER | Age: 41
End: 2021-05-06
Attending: PHYSICIAN ASSISTANT
Payer: COMMERCIAL

## 2021-05-06 DIAGNOSIS — R09.82 PND (POST-NASAL DRIP): Primary | ICD-10-CM

## 2021-05-06 DIAGNOSIS — K21.9 GASTROESOPHAGEAL REFLUX DISEASE WITHOUT ESOPHAGITIS: ICD-10-CM

## 2021-05-06 DIAGNOSIS — G43.109 MIGRAINE WITH AURA AND WITHOUT STATUS MIGRAINOSUS, NOT INTRACTABLE: ICD-10-CM

## 2021-05-06 PROCEDURE — 99213 OFFICE O/P EST LOW 20 MIN: CPT | Performed by: PHYSICIAN ASSISTANT

## 2021-05-06 RX ORDER — SUMATRIPTAN 50 MG/1
50 TABLET, FILM COATED ORAL
Qty: 12 TABLET | Refills: 3 | Status: SHIPPED | OUTPATIENT
Start: 2021-05-06 | End: 2021-08-10

## 2021-05-06 RX ORDER — FLUTICASONE PROPIONATE 50 MCG
2 SPRAY, SUSPENSION (ML) NASAL DAILY
Qty: 16 G | Refills: 3 | Status: SHIPPED | OUTPATIENT
Start: 2021-05-06 | End: 2021-09-17

## 2021-05-06 NOTE — PROGRESS NOTES
Hodan is a 40 year old who is being evaluated via a billable telephone visit.      What phone number would you like to be contacted at? [phone]   How would you like to obtain your AVS? [email or web address]    Assessment & Plan   Problem List Items Addressed This Visit     None      Visit Diagnoses     PND (post-nasal drip)    -  Primary    Relevant Medications    fluticasone (FLONASE) 50 MCG/ACT nasal spray    Migraine with aura and without status migrainosus, not intractable        Relevant Medications    SUMAtriptan (IMITREX) 50 MG tablet    Gastroesophageal reflux disease without esophagitis             GERD: Patient would like to do omeprazole over-the-counter over the next 2 to 4 weeks to help calm down her GERD.  She will discontinue Pepcid while taking omeprazole.  She will then restart the Pepcid to see if this continues to maintain control of her reflux.  Encourage good diet and exercise along with weight loss in order to improve heartburn symptoms.    Migraines: Discussed symptoms at length.  Patient can continue to use Tylenol ibuprofen sparingly as needed however encouraged to not take daily.  Can use over-the-counter migraine medications as needed.  Patient was also given a prescription for Imitrex to use sparingly as needed.  Return for a clinic appointment for recheck if symptoms are not calming down or worsening if needed.    Postnasal drip: Encouraged to restart her Flonase nasal spray.  Can use over-the-counter allergy medications as needed for symptomatic relief.  Return as needed for recheck.    No follow-ups on file.    Toña Beckham PA-C  [facility]    Subjective   Hodan is a 40 year old who presents for the following health issues     HPI     Sore throat, acid reflux, headaches, sharp lower back pain on the left side    Patient has had a sore throat over the last couple weeks.  Hurts with swallowing.  Unsure if it is due to her reflux or postnasal drip.  No cough or cold  symptoms.  Except 17 and trying to swallow with a postnasal drip.  Her postnasal drip has been flaring for a bit.  She did have stopping her Flonase nasal spray as symptoms had been resolved with her postnasal drip.  Currently flaring.  History of struggling with acid reflux in the past.  Currently taking famotidine 40 mg every morning.  Occasionally will take an extra tablet in the evening.  Heartburn tends to flare at nighttime.  Notices it when she is sleeping.  Worse with certain foods.    Patient gets headaches often.  Father has history of migraines.  Her dad gets cluster headaches.  Currently using Tylenol and ibuprofen 800 mg as needed.  Does not seem to help.  Tries to lay in bed and fall asleep when she gets symptoms coming on.  Affected by light and noise.  Pain is above her eyes, worse over the left eye.  Nauseous at times.  No vomiting.  No head injury or trauma.  She has had headaches for long time however they are recently worsening.    Headache  Description  Location: bilateral in the frontal area   Character: throbbing pain, sharp pain, constant pressure  Frequency:  Goes up and down  Duration:  changes  Wake with headaches: sometimes lingers from the night before  Able to do daily activities when headache present: YES  Intensity:  moderate  Progression of Symptoms:  worsening  Accompanying signs and symptoms:  Stiff neck: no  Neck or upper back pain: no  Sinus or URI symptoms no   Fever: no  Nausea or vomiting: nausea  Dizziness: no  Numbness/tingling: no  Weakness: no  History  Head trauma: no  Family history of migraines: YES - father  Daily pain medication use: take tylenol or ibuprofen daily for body aches while at work  Previous tests for headaches: no  Neurologist evaluation: no  Precipitating or Alleviating factors (light/sound/sleep/caffeine): light and sound  Therapies tried and outcome: Ibuprofen (Advil, Motrin) and Tylenol              Outcome - not effective      Review of Systems    Constitutional, HEENT, cardiovascular, pulmonary, gi and gu systems are negative, except as otherwise noted.      Objective           Vitals:  No vitals were obtained today due to virtual visit.    Physical Exam   healthy, alert and no distress  PSYCH: Alert and oriented times 3; coherent speech, normal   rate and volume, able to articulate logical thoughts, able   to abstract reason, no tangential thoughts, no hallucinations   or delusions  Her affect is normal  RESP: No cough, no audible wheezing, able to talk in full sentences  Remainder of exam unable to be completed due to telephone visits            Phone call duration: 19 minutes

## 2021-06-03 ENCOUNTER — MYC MEDICAL ADVICE (OUTPATIENT)
Dept: FAMILY MEDICINE | Facility: OTHER | Age: 41
End: 2021-06-03

## 2021-06-03 DIAGNOSIS — E03.9 HYPOTHYROIDISM, UNSPECIFIED TYPE: ICD-10-CM

## 2021-06-03 RX ORDER — LEVOTHYROXINE SODIUM 125 UG/1
125 TABLET ORAL DAILY
Qty: 90 TABLET | Refills: 0 | Status: CANCELLED | OUTPATIENT
Start: 2021-06-03

## 2021-06-03 NOTE — TELEPHONE ENCOUNTER
Called and spoke with patient and patient states she only has a few tablets left of her Sertraline.  Informed patient that Sertraline was refilled on 4/8/2021 for a years supply.  Informed patient that Levothyroxine was refilled only for #90 as Toña Beckham wanted thyroid lab done in 2 months.    Patient verbalized understanding.    Nuvia Arrieta RN on 6/3/2021 at 10:07 AM

## 2021-06-23 DIAGNOSIS — E28.2 PCOS (POLYCYSTIC OVARIAN SYNDROME): ICD-10-CM

## 2021-06-24 NOTE — TELEPHONE ENCOUNTER
Disp Refills Start End NARESH   metFORMIN (GLUCOPHAGE) 500 MG tablet 180 tablet 3 4/8/2021  No   Sig: TAKE 1 TABLET(500 MG) BY MOUTH BID with meals     Attempted to contact patient. Left message as patient should have enough refills. Request is too soon. Attempt to verify if patient requested refill from requesting pharmacy. Faye Sanford RN  ....................  6/24/2021   10:54 AM

## 2021-06-25 NOTE — TELEPHONE ENCOUNTER
Unable to reach Pt. [facility] in [place] sent message to transfer the following as appropriate:    metFORMIN (GLUCOPHAGE) 500 MG tablet 180 tablet 3 4/8/2021  No   Sig: TAKE 1 TABLET(500 MG) BY MOUTH BID with meals     [id number] - [place], [place] - [address]     Unable to complete prescription refill per RN Medication Refill Policy. Zina Kim RN .............. 6/25/2021  9:39 AM

## 2021-07-12 ENCOUNTER — MYC REFILL (OUTPATIENT)
Dept: FAMILY MEDICINE | Facility: OTHER | Age: 41
End: 2021-07-12

## 2021-07-12 DIAGNOSIS — E03.9 HYPOTHYROIDISM, UNSPECIFIED TYPE: ICD-10-CM

## 2021-07-13 RX ORDER — LEVOTHYROXINE SODIUM 125 UG/1
125 TABLET ORAL DAILY
Qty: 90 TABLET | Refills: 0 | Status: SHIPPED | OUTPATIENT
Start: 2021-07-13 | End: 2021-09-17

## 2021-07-13 NOTE — TELEPHONE ENCOUNTER
"Pt  sent Rx request for the following:     Requested Prescriptions   Pending Prescriptions Disp Refills     levothyroxine (SYNTHROID/LEVOTHROID) 125 MCG tablet 90 tablet 0     Sig: Take 1 tablet (125 mcg) by mouth daily        Last Prescription Date:   4/8/2021  Last Fill Qty/Refills:         90, R-0    Last Office Visit:              4/8/2021  Future Office visit:           none     Thyroid Protocol Passed - 7/12/2021 11:55 AM        Passed - Patient is 12 years or older        Passed - Recent (12 mo) or future (30 days) visit within the authorizing provider's specialty     Patient has had an office visit with the authorizing provider or a provider within the authorizing providers department within the previous 12 mos or has a future within next 30 days. See \"Patient Info\" tab in inbasket, or \"Choose Columns\" in Meds & Orders section of the refill encounter.              Passed - Medication is active on med list        Passed - Normal TSH on file in past 12 months     No lab results found.           Passed - No active pregnancy on record     If patient is pregnant or has had a positive pregnancy test, please check TSH.          Passed - No positive pregnancy test in past 12 months     If patient is pregnant or has had a positive pregnancy test, please check TSH.             Prescription approved per [facility] Refill Protocol.  Unique Bhagat RN ,....................  7/13/2021   10:30 AM    "

## 2021-08-09 DIAGNOSIS — G43.109 MIGRAINE WITH AURA AND WITHOUT STATUS MIGRAINOSUS, NOT INTRACTABLE: ICD-10-CM

## 2021-08-10 RX ORDER — SUMATRIPTAN 50 MG/1
50 TABLET, FILM COATED ORAL
Qty: 12 TABLET | Refills: 11 | Status: SHIPPED | OUTPATIENT
Start: 2021-08-10 | End: 2021-09-27

## 2021-08-10 NOTE — TELEPHONE ENCOUNTER
[facility] [place] sent Rx request for the following:     Requested Prescriptions   Pending Prescriptions Disp Refills     SUMAtriptan (IMITREX) 50 MG tablet 12 tablet 3     Sig: Take 1 tablet (50 mg) by mouth at onset of headache for migraine May repeat in 2 hours. Max 4 tablets/24 hours.     Last Prescription Date:   5/6/2021  Last Fill Qty/Refills:         12, R-3    Last Office Visit:              5/6/2021  Future Office visit:           none    Routing refill request to provider for review/approval because:        Serotonin Agonists Failed - 8/9/2021  9:47 AM        Failed - Serotonin Agonist request needs review.     Please review patient's record. If patient has had 8 or more treatments in the past month, please forward to provider.       Unable to complete prescription refill per RN Medication Refill Policy.................... Unique Bhagat RN ....................  8/10/2021   3:37 PM

## 2021-08-16 ENCOUNTER — MYC MEDICAL ADVICE (OUTPATIENT)
Dept: FAMILY MEDICINE | Facility: OTHER | Age: 41
End: 2021-08-16

## 2021-09-01 ENCOUNTER — OFFICE VISIT (OUTPATIENT)
Dept: FAMILY MEDICINE | Facility: OTHER | Age: 41
End: 2021-09-01
Attending: NURSE PRACTITIONER
Payer: COMMERCIAL

## 2021-09-01 VITALS
DIASTOLIC BLOOD PRESSURE: 80 MMHG | BODY MASS INDEX: 37.04 KG/M2 | OXYGEN SATURATION: 97 % | SYSTOLIC BLOOD PRESSURE: 122 MMHG | RESPIRATION RATE: 18 BRPM | HEART RATE: 92 BPM | WEIGHT: 215.8 LBS | TEMPERATURE: 98.5 F

## 2021-09-01 DIAGNOSIS — T63.441A BEE STING REACTION, ACCIDENTAL OR UNINTENTIONAL, INITIAL ENCOUNTER: Primary | ICD-10-CM

## 2021-09-01 PROCEDURE — G0463 HOSPITAL OUTPT CLINIC VISIT: HCPCS | Performed by: NURSE PRACTITIONER

## 2021-09-01 PROCEDURE — 99213 OFFICE O/P EST LOW 20 MIN: CPT | Performed by: NURSE PRACTITIONER

## 2021-09-01 RX ORDER — PREDNISONE 20 MG/1
40 TABLET ORAL DAILY
Qty: 10 TABLET | Refills: 0 | Status: SHIPPED | OUTPATIENT
Start: 2021-09-01 | End: 2021-09-06

## 2021-09-01 ASSESSMENT — PAIN SCALES - GENERAL: PAINLEVEL: MODERATE PAIN (4)

## 2021-09-01 NOTE — PATIENT INSTRUCTIONS
Continue your allergy medication (antihistamine).   Prednisone x5 days, if totally resolved within 2-3 days may stop. I want you to take first dose right away to decrease the swelling inside of your ear canal and external ear.   Follow up if symptoms persist or worsen. At this time, not treating for ear infection. Cannot visualize the middle ear but I do not believe middle ear infection is likely at this time. I believe your symptoms are related to the bee sting that occurred to your external ear.   May take ibuprofen/tylenol.     Cool compresses/ice may be helpful.

## 2021-09-01 NOTE — PROGRESS NOTES
ASSESSMENT/PLAN:    I have reviewed the nursing notes.  I have reviewed the findings, diagnosis, plan and need for follow up with the patient.    1. Bee sting reaction, accidental or unintentional, initial encounter  - predniSONE (DELTASONE) 20 MG tablet; Take 2 tablets (40 mg) by mouth daily for 5 days  Dispense: 10 tablet; Refill: 0  -Recommend ice or cool compresses to the site of swelling, take prednisone but if symptoms are completely improved within 2-3 doses do not need to take full 5 days.  Prescribed a 5-day course because of the significance of external ear canal swelling.  Is agreeable to this.  I am unable to visualize the tympanic membrane but at this time I do not suspect she has a middle ear infection.  Patient was stung by a bee 3 days ago on the triangular fossa prior to onset of symptoms.  Follow-up if symptoms persist or worsen or if have further concerns.  I suspect that this is going to improve over the next 2 days or so,  But if it does not recommend following up in case a secondary infection has presented.  -May use over-the-counter Tylenol or ibuprofen PRN  -Continue daily antihistamine.    Discussed warning signs/symptoms indicative of need to f/u    Follow up if symptoms persist or worsen or concerns    I explained my diagnostic considerations and recommendations to the patient, who voiced understanding and agreement with the treatment plan. All questions were answered. We discussed potential side effects of any prescribed or recommended therapies, as well as expectations for response to treatments.    Hodan Michelle NP  9/1/2021  2:50 PM    HPI:  Hodan DAVE Geetha is a 41 year old female who presents to [facility] today for concerns of left ear pain that started Sunday after being stung by a bee in the external ear. Patient has tried Ibuprofen, rinsing with peroxide and states she has stuck things in the ear to try unplugging it. No drainage. Has decreased hearing from left, no tinnitus.  No fever/chills. Right ear is unaffected. No significant ear infections as an adult, had had issues with cerumen impaction before in the past.     Otherwise negative ROS.     Past Medical History:   Diagnosis Date     Anxiety disorder     No Comments Provided     Gastro-esophageal reflux disease without esophagitis     No Comments Provided     Hypothyroidism     No Comments Provided     Migraine      Past Surgical History:   Procedure Laterality Date     CHOLECYSTECTOMY      No Comments Provided     Social History     Tobacco Use     Smoking status: Never Smoker     Smokeless tobacco: Never Used   Substance Use Topics     Alcohol use: Yes     Alcohol/week: 0.0 standard drinks     Comment: Alcoholic Drinks/day: social     Current Outpatient Medications   Medication Sig Dispense Refill     levothyroxine (SYNTHROID/LEVOTHROID) 125 MCG tablet Take 1 tablet (125 mcg) by mouth daily 90 tablet 0     metFORMIN (GLUCOPHAGE) 500 MG tablet TAKE 1 TABLET(500 MG) BY MOUTH BID with meals 180 tablet 3     predniSONE (DELTASONE) 20 MG tablet Take 2 tablets (40 mg) by mouth daily for 5 days 10 tablet 0     sertraline (ZOLOFT) 100 MG tablet Take 1 tablet (100 mg) by mouth daily 90 tablet 3     fluticasone (FLONASE) 50 MCG/ACT nasal spray Spray 2 sprays into both nostrils daily for post nasal drip (Patient not taking: Reported on 9/1/2021) 16 g 3     SUMAtriptan (IMITREX) 50 MG tablet Take 1 tablet (50 mg) by mouth at onset of headache for migraine May repeat in 2 hours. Max 4 tablets/24 hours. (Patient not taking: Reported on 9/1/2021) 12 tablet 11     No Known Allergies  Past medical history, past surgical history, current medications and allergies reviewed and accurate to the best of my knowledge.      ROS:  Refer to HPI    /80   Pulse 92   Temp 98.5  F (36.9  C) (Tympanic)   Resp 18   Wt 97.9 kg (215 lb 12.8 oz)   SpO2 97%   BMI 37.04 kg/m      EXAM:  General Appearance: Well appearing 41 year old female, appropriate  appearance for age. No acute distress   Ears: Left TM is unable to be visualized due to external ear canal swelling, There is no drainage present in the canal. No purulence. Right TM intact, translucent with bony landmarks appreciated, no erythema, no effusion, no bulging, no purulence.  Left auditory canal clear.  Right auditory canal clear.  Normal external ears, non tender.  Eyes: conjunctivae normal without erythema or irritation, corneas clear, no drainage or crusting, no eyelid swelling, pupils equal   Orophayrnx: airway is patent. moist mucous membranes, posterior pharynx without erythema, tonsils without hypertrophy, no erythema, no exudates or petechiae, no post nasal drip seen, no trismus, voice clear.    Nose:  Bilateral nares: no erythema, no edema, no drainage or congestion   Respiratory: normal chest wall and respirations.  Normal effort.  Clear to auscultation bilaterally, no wheezing, crackles or rhonchi.  No increased work of breathing.  No cough appreciated.  Cardiac: RRR with no murmurs  Psychological: normal affect, alert, oriented, and pleasant.

## 2021-09-01 NOTE — NURSING NOTE
"Chief Complaint   Patient presents with     Otalgia     Patient is here for pain in her left ear that started Sunday. Patient thinks she may have been stung by a bee but now feels like her ear is plugged. Patient has tried Ibuprofen, rinsing with peroxide and states she has stuck things in the ear to try unplugging it.     Initial /80   Pulse 92   Temp 98.5  F (36.9  C) (Tympanic)   Resp 18   Wt 97.9 kg (215 lb 12.8 oz)   SpO2 97%   BMI 37.04 kg/m   Estimated body mass index is 37.04 kg/m  as calculated from the following:    Height as of 4/17/20: 1.626 m (5' 4\").    Weight as of this encounter: 97.9 kg (215 lb 12.8 oz).  Medication Reconciliation: complete    Soo Huerta LPN  "

## 2021-09-06 ENCOUNTER — OFFICE VISIT (OUTPATIENT)
Dept: FAMILY MEDICINE | Facility: OTHER | Age: 41
End: 2021-09-06
Attending: NURSE PRACTITIONER
Payer: COMMERCIAL

## 2021-09-06 VITALS
BODY MASS INDEX: 37.39 KG/M2 | TEMPERATURE: 98 F | SYSTOLIC BLOOD PRESSURE: 118 MMHG | DIASTOLIC BLOOD PRESSURE: 70 MMHG | WEIGHT: 219 LBS | OXYGEN SATURATION: 99 % | HEART RATE: 86 BPM | HEIGHT: 64 IN | RESPIRATION RATE: 18 BRPM

## 2021-09-06 DIAGNOSIS — H66.002 NON-RECURRENT ACUTE SUPPURATIVE OTITIS MEDIA OF LEFT EAR WITHOUT SPONTANEOUS RUPTURE OF TYMPANIC MEMBRANE: Primary | ICD-10-CM

## 2021-09-06 PROCEDURE — 99213 OFFICE O/P EST LOW 20 MIN: CPT | Performed by: NURSE PRACTITIONER

## 2021-09-06 PROCEDURE — G0463 HOSPITAL OUTPT CLINIC VISIT: HCPCS

## 2021-09-06 ASSESSMENT — PAIN SCALES - GENERAL: PAINLEVEL: MILD PAIN (2)

## 2021-09-06 ASSESSMENT — MIFFLIN-ST. JEOR: SCORE: 1638.38

## 2021-09-06 NOTE — PATIENT INSTRUCTIONS
Take full course of augmentin for 10 days twice daily.     Follow up if symptoms persist or worsen.

## 2021-09-06 NOTE — NURSING NOTE
"Chief Complaint   Patient presents with     Ear Problem     Patient presented to the clinic with left ear pain that she was seen for on 9/1/21 and has not gotten any better. Patient reports that she is having increased drainage and pain in that ear.    Initial /70 (BP Location: Right arm, Patient Position: Sitting, Cuff Size: Adult Large)   Pulse 86   Temp 98  F (36.7  C) (Tympanic)   Resp 18   Ht 1.626 m (5' 4\")   Wt 99.3 kg (219 lb)   LMP  (LMP Unknown)   SpO2 99%   BMI 37.59 kg/m   Estimated body mass index is 37.59 kg/m  as calculated from the following:    Height as of this encounter: 1.626 m (5' 4\").    Weight as of this encounter: 99.3 kg (219 lb).     FOOD SECURITY SCREENING QUESTIONS  Hunger Vital Signs:  Within the past 12 months we worried whether our food would run out before we got money to buy more. Never  Within the past 12 months the food we bought just didn't last and we didn't have money to get more. Never      Medication Reconciliation: Complete      Maranda Hughes, NERY   "

## 2021-09-06 NOTE — PROGRESS NOTES
ASSESSMENT/PLAN:    I have reviewed the nursing notes.  I have reviewed the findings, diagnosis, plan and need for follow up with the patient.    1. Non-recurrent acute suppurative otitis media of left ear without spontaneous rupture of tympanic membrane  - amoxicillin-clavulanate (AUGMENTIN) 875-125 MG tablet; Take 1 tablet by mouth 2 times daily for 10 days  Dispense: 20 tablet; Refill: 0  -take full course of augmentin for purulent middle ear infection.  -May use over-the-counter Tylenol or ibuprofen PRN    Discussed warning signs/symptoms indicative of need to f/u    Follow up if symptoms persist or worsen or concerns    I explained my diagnostic considerations and recommendations to the patient, who voiced understanding and agreement with the treatment plan. All questions were answered. We discussed potential side effects of any prescribed or recommended therapies, as well as expectations for response to treatments.    Hodan Michelle NP  9/6/2021  10:24 AM    HPI:  Hodan Iniguez is a 41 year old female who presents to [facility] today for concerns of clinic with left ear pain. She was seen on 9/1/21 with ear pain and swelling after being stung by bee to that area. Swelling has improved, but discomfort has not. She has since started to notice drainage from the ear. Some muffled hearing, no ringing of the ear. Feels plugged. Right ear is unaffected. No fevers or chills. Does not feel generally unwell.     ROS otherwise negative.     Past Medical History:   Diagnosis Date     Anxiety disorder     No Comments Provided     Gastro-esophageal reflux disease without esophagitis     No Comments Provided     Hypothyroidism     No Comments Provided     Migraine      Past Surgical History:   Procedure Laterality Date     CHOLECYSTECTOMY      No Comments Provided     Social History     Tobacco Use     Smoking status: Never Smoker     Smokeless tobacco: Never Used   Substance Use Topics     Alcohol use: Yes      "Alcohol/week: 0.0 standard drinks     Comment: Alcoholic Drinks/day: social     Current Outpatient Medications   Medication Sig Dispense Refill     amoxicillin-clavulanate (AUGMENTIN) 875-125 MG tablet Take 1 tablet by mouth 2 times daily for 10 days 20 tablet 0     fluticasone (FLONASE) 50 MCG/ACT nasal spray Spray 2 sprays into both nostrils daily for post nasal drip 16 g 3     levothyroxine (SYNTHROID/LEVOTHROID) 125 MCG tablet Take 1 tablet (125 mcg) by mouth daily 90 tablet 0     metFORMIN (GLUCOPHAGE) 500 MG tablet TAKE 1 TABLET(500 MG) BY MOUTH BID with meals 180 tablet 3     predniSONE (DELTASONE) 20 MG tablet Take 2 tablets (40 mg) by mouth daily for 5 days 10 tablet 0     sertraline (ZOLOFT) 100 MG tablet Take 1 tablet (100 mg) by mouth daily 90 tablet 3     SUMAtriptan (IMITREX) 50 MG tablet Take 1 tablet (50 mg) by mouth at onset of headache for migraine May repeat in 2 hours. Max 4 tablets/24 hours. 12 tablet 11     No Known Allergies  Past medical history, past surgical history, current medications and allergies reviewed and accurate to the best of my knowledge.      ROS:  Refer to HPI    /70 (BP Location: Right arm, Patient Position: Sitting, Cuff Size: Adult Large)   Pulse 86   Temp 98  F (36.7  C) (Tympanic)   Resp 18   Ht 1.626 m (5' 4\")   Wt 99.3 kg (219 lb)   LMP  (LMP Unknown)   SpO2 99%   BMI 37.59 kg/m      EXAM:  General Appearance: Well appearing 41 year old female, appropriate appearance for age. No acute distress  Ears: Left TM intact, with decreased bony landmarks, marked erythema and purulent drainage observed in the middle ear.  Right TM intact, translucent with bony landmarks appreciated, no erythema, no effusion, no bulging, no purulence.  Left auditory canal clear.  Right auditory canal clear.  Normal external ears, non tender.  Eyes: conjunctivae normal without erythema or irritation, corneas clear, no drainage or crusting, no eyelid swelling, pupils equal "   Orophayrnx: moist mucous membranes, posterior pharynx without erythema, tonsils without hypertrophy, no erythema, no exudates or petechiae, no post nasal drip seen, no trismus, voice clear.    Sinuses:  No sinus tenderness upon palpation of the frontal or maxillary sinuses  Nose:  Bilateral nares: no erythema, no edema, no drainage or congestion   Neck: supple without adenopathy  Respiratory: normal chest wall and respirations.  Normal effort.  Clear to auscultation bilaterally, no wheezing, crackles or rhonchi.  No increased work of breathing.  No cough appreciated.  Cardiac: RRR with no murmurs  Psychological: normal affect, alert, oriented, and pleasant.

## 2021-09-11 ENCOUNTER — OFFICE VISIT (OUTPATIENT)
Dept: FAMILY MEDICINE | Facility: OTHER | Age: 41
End: 2021-09-11
Attending: FAMILY MEDICINE
Payer: COMMERCIAL

## 2021-09-11 VITALS
OXYGEN SATURATION: 97 % | HEIGHT: 64 IN | SYSTOLIC BLOOD PRESSURE: 136 MMHG | TEMPERATURE: 97.8 F | BODY MASS INDEX: 36.4 KG/M2 | HEART RATE: 101 BPM | DIASTOLIC BLOOD PRESSURE: 82 MMHG | WEIGHT: 213.19 LBS | RESPIRATION RATE: 20 BRPM

## 2021-09-11 DIAGNOSIS — L30.9 ECZEMA, UNSPECIFIED TYPE: ICD-10-CM

## 2021-09-11 DIAGNOSIS — H66.012 NON-RECURRENT ACUTE SUPPURATIVE OTITIS MEDIA OF LEFT EAR WITH SPONTANEOUS RUPTURE OF TYMPANIC MEMBRANE: ICD-10-CM

## 2021-09-11 DIAGNOSIS — Z32.00 POSSIBLE PREGNANCY: Primary | ICD-10-CM

## 2021-09-11 DIAGNOSIS — Z32.01 PREGNANCY TEST POSITIVE: ICD-10-CM

## 2021-09-11 LAB — HCG UR QL: POSITIVE

## 2021-09-11 PROCEDURE — G0463 HOSPITAL OUTPT CLINIC VISIT: HCPCS

## 2021-09-11 PROCEDURE — 81025 URINE PREGNANCY TEST: CPT | Mod: ZL | Performed by: PHYSICIAN ASSISTANT

## 2021-09-11 PROCEDURE — 99214 OFFICE O/P EST MOD 30 MIN: CPT | Performed by: PHYSICIAN ASSISTANT

## 2021-09-11 RX ORDER — PRENATAL VIT/IRON FUM/FOLIC AC 27MG-0.8MG
1 TABLET ORAL DAILY
Qty: 90 TABLET | Refills: 0 | Status: SHIPPED | OUTPATIENT
Start: 2021-09-11 | End: 2021-09-17

## 2021-09-11 RX ORDER — CEFDINIR 300 MG/1
300 CAPSULE ORAL 2 TIMES DAILY
Qty: 20 CAPSULE | Refills: 0 | Status: SHIPPED | OUTPATIENT
Start: 2021-09-11 | End: 2021-09-21

## 2021-09-11 RX ORDER — OFLOXACIN 3 MG/ML
1-2 SOLUTION/ DROPS OPHTHALMIC 4 TIMES DAILY
Qty: 4 ML | Refills: 0 | Status: SHIPPED | OUTPATIENT
Start: 2021-09-11 | End: 2021-09-21

## 2021-09-11 RX ORDER — TRIAMCINOLONE ACETONIDE 1 MG/G
CREAM TOPICAL 2 TIMES DAILY
Qty: 28.4 G | Refills: 0 | Status: SHIPPED | OUTPATIENT
Start: 2021-09-11 | End: 2021-09-17

## 2021-09-11 ASSESSMENT — MIFFLIN-ST. JEOR: SCORE: 1612.01

## 2021-09-11 ASSESSMENT — PAIN SCALES - GENERAL: PAINLEVEL: NO PAIN (0)

## 2021-09-11 NOTE — PATIENT INSTRUCTIONS
"Please refer to your AVS for follow up and pain/symptoms management recommendations (I.e.: medications, helpful conservative treatment modalities, appropriate follow up if need to a specialist or family practice, etc.). Please return to urgent care if your symptoms change or worsen.     Discharge instructions:  -If you were prescribed a medication(s), please take this as prescribed/directed  -Monitor your symptoms, if changing/worsening, return to UC/ER or PCP for follow up    - For ear infection. Take entire course of antibiotics to ensure this clears (even if feeling better).  - Tylenol or ibuprofen for pain and fevers.   - Eat yogurt, kefir or take over-the-counter \"probiotic\" at least 2 hours before or after a dose of antibiotic. This will replace good bacteria that may have been lost due to the antibiotic. (This may also help to prevent yeast infections and upset stomach during the course of antibiotic.)  - In the future at onset of congestion: Blow nose or use bulb syringe to keep nasal congestion cleared and use saline nasal spray/flush.  -Alternative ibuprofen and tylenol as needed.   -Rest/relaxation and keeping hydrated with clear liquids (ie: water or gatorade). Using a humidifier may be beneficial as well.     * Recheck with family practice as needed or ER sooner with worsening or concerns.     You were prescribed an antibiotic, please take into consideration the following information:  - Take entire course of antibiotic even if you start to feel better.  - Antibiotics can cause stomach upset including nausea and diarrhea. Read your bottle or ask the pharmacist if antibiotic can be taken with food to help prevent nausea. If you have symptoms of diarrhea you can take an over-the-counter probiotic and/or increase foods with probiotics such as yogurt, [place], sauerkraut.  -Use caution in sunlight as can lead to increased risk of sunburn while on ABX (antibiotics).     "

## 2021-09-11 NOTE — PROGRESS NOTES
ASSESSMENT/PLAN:    I have reviewed the nursing notes.  I have reviewed the findings, diagnosis, plan and need for follow up with the patient.    1. Possible pregnancy  - Pregnancy, Urine (HCG)  - Refer to #2    2. Pregnancy test positive  - Ob/Gyn Referral; Future  - Prenatal Vit-Fe Fumarate-FA (PRENATAL MULTIVITAMIN W/IRON) 27-0.8 MG tablet; Take 1 tablet by mouth daily  Dispense: 90 tablet; Refill: 0  - Pregnancy confirmed on UA today, suspect approximately 3 weeks and 5 days along since LMP. Will refer to OB as high risk pregnancy. Patient to be started on prenatal and will allow OB to update treatment plan as needed. Avoid NSAIDs and teratogenic medications. Patient in understanding of this. No further questions/concerns.     3. Non-recurrent acute suppurative otitis media of left ear with spontaneous rupture of tympanic membrane  - cefdinir (OMNICEF) 300 MG capsule; Take 1 capsule (300 mg) by mouth 2 times daily for 10 days  Dispense: 20 capsule; Refill: 0  - ofloxacin (OCUFLOX) 0.3 % ophthalmic solution; Place 1-2 drops Into the left ear 4 times daily for 10 days  Dispense: 4 mL; Refill: 0  - Persistent otitis media with small TM rupture since seen on 9/6. Will change to Omnicef as not resolving with Augmentin as suspected. As well, will add in Ofloxacin drops. Both x 10 days duration. Alternate tylenol, heat and ice. Avoid NSAIDs due to pregnancy. Monitor for changing or worsening symptoms, follow up if these occur. Patient is in agreement and understanding of the above treatment plan. All questions and concerns were addressed and answered to patient's satisfaction. AVS reviewed with patient.     4. Eczema, unspecified type  - triamcinolone (KENALOG) 0.1 % external cream; Apply topically 2 times daily  Dispense: 28.4 g; Refill: 0  - Small patch of eczema to DIP joint of left index finger. Kenalog cream prescribe to utilize TID as needed. Recommend emollients such as CeraVae or Cetaphil, unscented/dye free  products/clean and gentle formularies. Follow up with PCP if symptoms persist.     Discussed warning signs/symptoms indicative of need to f/u    Follow up if symptoms persist or worsen or concerns    I explained my diagnostic considerations and recommendations to the patient, who voiced understanding and agreement with the treatment plan. All questions were answered. We discussed potential side effects of any prescribed or recommended therapies, as well as expectations for response to treatments.    Gregoria Galloway PA-C  2021  5:18 PM    HPI:    Hodan Iniguez is a 41 year old female  who presents to [facility] today for concerns of the following:     -Possible pregnancy. LMP: 21. . Home test: positive x 2 this AM. Unprotected sex. Unplanned pregnancy. Increased fatigue and urinary urgency. No prenatals.   -Concerns of left ear infection which has not resolved, pain has, but still feeling plugged. Patient seen on  and minor infection, then seen on  - placed on Augmentin BID x 10 d. Since this time: feeling plugged. Fevers, chills: none. Drainage: dark yellow. Pain level: 0/10. Additional treatments tried: Tylenol.     -Rash:  Description:   Location: left index DIP joint  Character: red  Itching (Pruritis): extremely itchy  Description: Stable    Progression of Symptoms:  same    Accompanying Signs & Symptoms:  Fever: no  Body aches or joint pain: no  Sore throat symptoms: no  Recent cold symptoms: no    History:   Previous similar rash: no    Precipitating factors:   Similar rash in past: no  New exposures: None   Recent travel: no  Exacerbating Factors: heat and humidity    Alleviating factors: none    Therapies Tried and outcome: Benadryl    Exposure History: none known    Additional symptoms: none    Recent medication or other changes: none    PCP: None     Past Medical History:   Diagnosis Date     Anxiety disorder     No Comments Provided     Gastro-esophageal reflux disease without  "esophagitis     No Comments Provided     Hypothyroidism     No Comments Provided     Migraine      Past Surgical History:   Procedure Laterality Date     CHOLECYSTECTOMY      No Comments Provided     Social History     Tobacco Use     Smoking status: Never Smoker     Smokeless tobacco: Never Used   Substance Use Topics     Alcohol use: Yes     Alcohol/week: 0.0 standard drinks     Comment: Alcoholic Drinks/day: social     Current Outpatient Medications   Medication Sig Dispense Refill     amoxicillin-clavulanate (AUGMENTIN) 875-125 MG tablet Take 1 tablet by mouth 2 times daily for 10 days 20 tablet 0     fluticasone (FLONASE) 50 MCG/ACT nasal spray Spray 2 sprays into both nostrils daily for post nasal drip 16 g 3     levothyroxine (SYNTHROID/LEVOTHROID) 125 MCG tablet Take 1 tablet (125 mcg) by mouth daily 90 tablet 0     metFORMIN (GLUCOPHAGE) 500 MG tablet TAKE 1 TABLET(500 MG) BY MOUTH BID with meals 180 tablet 3     sertraline (ZOLOFT) 100 MG tablet Take 1 tablet (100 mg) by mouth daily 90 tablet 3     SUMAtriptan (IMITREX) 50 MG tablet Take 1 tablet (50 mg) by mouth at onset of headache for migraine May repeat in 2 hours. Max 4 tablets/24 hours. 12 tablet 11     No Known Allergies  Past medical history, past surgical history, current medications and allergies reviewed and accurate to the best of my knowledge.      ROS:  Refer to HPI    /82 (BP Location: Right arm, Patient Position: Sitting, Cuff Size: Adult Large)   Pulse 101   Temp 97.8  F (36.6  C) (Tympanic)   Resp 20   Ht 1.626 m (5' 4\")   Wt 96.7 kg (213 lb 3 oz)   LMP 08/16/2021 (Exact Date)   SpO2 97%   Breastfeeding No   BMI 36.59 kg/m      EXAM:  General Appearance: Well appearing 41-year old female, appropriate appearance for age. No acute distress  Ears: Left TM has a very small rupture with mild yellow fluid noted, effusion and bulging, mild purulence. Right TM intact, translucent with bony landmarks appreciated, no erythema, no " effusion, no bulging, no purulence.  Left auditory canal clear.  Right auditory canal clear.  Normal external ears, non tender.  Eyes: conjunctivae normal without erythema or irritation, corneas clear, no drainage or crusting, no eyelid swelling, pupils equal   Orophayrnx: moist mucous membranes, posterior pharynx without erythema, tonsils without hypertrophy, no erythema, no exudates or petechiae, no post nasal drip seen, no trismus, voice clear.    Sinuses:  No sinus tenderness upon palpation of the frontal or maxillary sinuses  Nose:  Bilateral nares: no erythema, no edema, no drainage or congestion   Neck: supple without adenopathy  Respiratory: normal chest wall and respirations.  Normal effort.  Clear to auscultation bilaterally, no wheezing, crackles or rhonchi.  No increased work of breathing.  No cough appreciated.  Cardiac: RRR with no murmurs  Abdomen: soft, nontender, no rigidity, no rebound tenderness or guarding, normal bowel sounds present  :  No suprapubic tenderness to palpation.  No CVA tenderness to palpation.    Dermatological:  Small flake like, pruritic rash to DIP joint of dorsal left index finger. No signs of cellulitis.   Psychological: normal affect, alert, oriented, and pleasant.     Labs:  Urine pregnancy is positive    Xray:  None

## 2021-09-16 ENCOUNTER — MYC MEDICAL ADVICE (OUTPATIENT)
Dept: FAMILY MEDICINE | Facility: OTHER | Age: 41
End: 2021-09-16

## 2021-09-17 ENCOUNTER — OFFICE VISIT (OUTPATIENT)
Dept: FAMILY MEDICINE | Facility: OTHER | Age: 41
End: 2021-09-17
Attending: PHYSICIAN ASSISTANT
Payer: COMMERCIAL

## 2021-09-17 VITALS
HEART RATE: 106 BPM | OXYGEN SATURATION: 98 % | BODY MASS INDEX: 36.7 KG/M2 | TEMPERATURE: 99.1 F | SYSTOLIC BLOOD PRESSURE: 130 MMHG | RESPIRATION RATE: 20 BRPM | WEIGHT: 213.8 LBS | DIASTOLIC BLOOD PRESSURE: 82 MMHG

## 2021-09-17 DIAGNOSIS — F33.1 MODERATE EPISODE OF RECURRENT MAJOR DEPRESSIVE DISORDER (H): Primary | ICD-10-CM

## 2021-09-17 DIAGNOSIS — Z32.01 PREGNANCY TEST POSITIVE: ICD-10-CM

## 2021-09-17 DIAGNOSIS — E03.9 HYPOTHYROIDISM, UNSPECIFIED TYPE: ICD-10-CM

## 2021-09-17 DIAGNOSIS — Z11.3 SCREEN FOR STD (SEXUALLY TRANSMITTED DISEASE): ICD-10-CM

## 2021-09-17 LAB
C TRACH DNA SPEC QL PROBE+SIG AMP: NEGATIVE
CLUE CELLS: ABNORMAL
N GONORRHOEA DNA SPEC QL NAA+PROBE: NEGATIVE
TRICHOMONAS, WET PREP: ABNORMAL
WBC'S/HIGH POWER FIELD, WET PREP: ABNORMAL
YEAST, WET PREP: ABNORMAL

## 2021-09-17 PROCEDURE — 87491 CHLMYD TRACH DNA AMP PROBE: CPT | Mod: ZL | Performed by: PHYSICIAN ASSISTANT

## 2021-09-17 PROCEDURE — 99213 OFFICE O/P EST LOW 20 MIN: CPT | Performed by: PHYSICIAN ASSISTANT

## 2021-09-17 PROCEDURE — G0463 HOSPITAL OUTPT CLINIC VISIT: HCPCS

## 2021-09-17 PROCEDURE — 87210 SMEAR WET MOUNT SALINE/INK: CPT | Mod: ZL | Performed by: PHYSICIAN ASSISTANT

## 2021-09-17 RX ORDER — LEVOTHYROXINE SODIUM 125 UG/1
125 TABLET ORAL DAILY
Qty: 90 TABLET | Refills: 3 | Status: SHIPPED | OUTPATIENT
Start: 2021-09-17 | End: 2021-12-09

## 2021-09-17 RX ORDER — SERTRALINE HYDROCHLORIDE 100 MG/1
150 TABLET, FILM COATED ORAL DAILY
Qty: 135 TABLET | Refills: 3 | Status: SHIPPED | OUTPATIENT
Start: 2021-09-17 | End: 2022-04-08

## 2021-09-17 RX ORDER — PRENATAL VIT/IRON FUM/FOLIC AC 27MG-0.8MG
1 TABLET ORAL DAILY
Qty: 90 TABLET | Refills: 3 | Status: SHIPPED | OUTPATIENT
Start: 2021-09-17 | End: 2022-11-30

## 2021-09-17 ASSESSMENT — ANXIETY QUESTIONNAIRES
IF YOU CHECKED OFF ANY PROBLEMS ON THIS QUESTIONNAIRE, HOW DIFFICULT HAVE THESE PROBLEMS MADE IT FOR YOU TO DO YOUR WORK, TAKE CARE OF THINGS AT HOME, OR GET ALONG WITH OTHER PEOPLE: SOMEWHAT DIFFICULT
1. FEELING NERVOUS, ANXIOUS, OR ON EDGE: NEARLY EVERY DAY
GAD7 TOTAL SCORE: 16
6. BECOMING EASILY ANNOYED OR IRRITABLE: MORE THAN HALF THE DAYS
7. FEELING AFRAID AS IF SOMETHING AWFUL MIGHT HAPPEN: MORE THAN HALF THE DAYS
2. NOT BEING ABLE TO STOP OR CONTROL WORRYING: NEARLY EVERY DAY
3. WORRYING TOO MUCH ABOUT DIFFERENT THINGS: NEARLY EVERY DAY
5. BEING SO RESTLESS THAT IT IS HARD TO SIT STILL: SEVERAL DAYS

## 2021-09-17 ASSESSMENT — PAIN SCALES - GENERAL: PAINLEVEL: NO PAIN (0)

## 2021-09-17 ASSESSMENT — PATIENT HEALTH QUESTIONNAIRE - PHQ9
5. POOR APPETITE OR OVEREATING: MORE THAN HALF THE DAYS
SUM OF ALL RESPONSES TO PHQ QUESTIONS 1-9: 12

## 2021-09-17 NOTE — PATIENT INSTRUCTIONS
-Take prenatal multivitamin daily.    -Encouraged increase of fluids and a balanced diet.    -Encouraged routine exercise.    -Abstain from alcohol, smoking and illicit drug use.   -Avoid taking medications like ibuprofen, advil, aspirin and aleve. You may take tylenol for pain.   -Limit caffeine consumption to less than 200 to 300 mg per day .      Continue sertraline 150 mg daily.  Encouraged good diet and exercise.   Encouraged to see a counselor.   Return to clinic with change/worsening of symptoms.       Suicide Emergency First call for help:  [phone]    Depression: Tips to Help Yourself  As your healthcare providers help treat your depression, you can also help yourself. Keep in mind that your illness affects you emotionally, physically, mentally, and socially. So full recovery will take time. Take care of your body and your soul, and be patient with yourself as you get better.    Self-care    Educate yourself. Read about treatment and medicine options. If you have the energy, attend local conferences or support groups. Keep a list of useful websites and helpful books and use them as needed. This illness is not your fault. Don t blame yourself for your depression.    Manage early symptoms. If you notice symptoms returning, experience triggers, or identify other factors that may lead to a depressive episode, get help as soon as possible. Ask trusted friends and family to monitor your behavior and let you know if they see anything of concern.    Work with your provider. Find a provider you can trust. Communicate honestly with that person and share information on your treatment for depression and your reaction to medicines.    Be prepared for a crisis. Know what to do if you experience a crisis. Keep the phone number of a crisis hotline and know the location of your community's urgent care centers and the closest emergency department.    Hold off on big decisions. Depression can cloud your  judgment. So wait until you feel better before making major life decisions, such as changing jobs, moving, or getting  or .    Be patient. Recovering from depression is a process. Don t be discouraged if it takes some time to feel better.    Keep it simple. Depression saps your energy and concentration. So you won t be able to do all the things you used to do. Set small goals and do what you can.    Be with others. Don t isolate yourself--you ll only feel worse. Try to be with other people. And take part in fun activities when you can. Go to a movie, [employer], [employer] service, or social event. Talk openly with people you can trust. And accept help when it s offered.  Take care of your body  People with depression often lose the desire to take care of themselves. That only makes their problems worse. During treatment and afterward, make a point to:    Exercise. It s a great way to take care of your body. And studies have shown that exercise helps fight depression.    Avoid drugs and alcohol. These may ease the pain in the short term. But they ll only make your problems worse in the long run.    Get relief from stress. Ask your healthcare provider for relaxation exercises and techniques to help relieve stress.    Eat right. A balanced and healthy diet helps keep your body healthy.  Date Last Reviewed: 1/1/2017 2000-2017 [employer]. [address] [place] [address], [place] [address]. All rights reserved. This information is not intended as a substitute for professional medical care. Always follow your healthcare professional's instructions.         Treating Anxiety Disorders with Therapy    If you have an anxiety disorder, you don t have to suffer anymore. Treatment is available. Therapy (also called counseling) is often a helpful treatment for anxiety disorders. With therapy, a specially trained professional (therapist) helps you face and learn to manage your anxiety. Therapy can be  short-term or long-term depending on your needs. In some cases, medicine may also be prescribed with therapy. It may take time before you notice how much therapy is helping, but stick with it. With therapy, you can feel better.  Cognitive behavioral therapy (CBT)  Cognitive behavioral therapy (CBT) teaches you to manage anxiety. It does this by helping you understand how you think and act when you re anxious. Research has shown CBT to be a very effective treatment for anxiety disorders. How CBT is run is almost like a class. It involves homework and activities to build skills that teach you to cope with anxiety step by step. It can be done in a group or one-on-one, and often takes place for a set number of sessions. CBT has two main parts:    Cognitive therapy helps you identify the negative, irrational thoughts that occur with your anxiety. You ll learn to replace these with more positive, realistic thoughts.    Behavioral therapy helps you change how you react to anxiety. You ll learn coping skills and methods for relaxing to help you better deal with anxiety.  Other forms of therapy  Other therapy methods may work better for you than CBT. Or, you may move from CBT to another form of therapy as your treatment needs change. This may mean meeting with a therapist by yourself or in a group. Therapy can also help you work through problems in your life, such as drug or alcohol dependence, that may be making your anxiety worse.  Getting better takes time  Therapy will help you feel better and teach you skills to help manage anxiety long term. But change doesn t happen right away. It takes a commitment from you. And treatment only works if you learn to face the causes of your anxiety. So, you might feel worse before you feel better. This can sometimes make it hard to stick with it. But remember: Therapy is a very effective treatment. The results will be well worth it.  Helping yourself  If anxiety is wearing you down,  here are some things you can do to cope:    Check with your doctor and rule out any physical problems that may be causing the anxiety symptoms.    If an anxiety disorder is diagnosed, seek mental healthcare. This is an illness and it can respond to treatment. Most types of anxiety disorders will respond to talk therapy and medicine.    Educate yourself about anxiety disorders. Keep track of helpful online resources and books you can use during stressful periods.    Try stress management techniques such as meditation.    Consider online or in-person support groups.    Don t fight your feelings. Anxiety feeds itself. The more you worry about it, the worse it gets. Instead, try to identify what might have triggered your anxiety. Then try to put this threat in perspective.    Keep in mind that you can t control everything about a situation. Change what you can and let the rest take its course.    Exercise -- it s a great way to relieve tension and help your body feel relaxed.    Examine your life for stress, and try to find ways to reduce it.    Avoid caffeine and nicotine, which can make anxiety symptoms worse.    Fight the temptation to turn to alcohol or unprescribed drugs for relief. They only make things worse in the long run.   Date Last Reviewed: 2017-2017 [employer]. [address] [place] [address]. All rights reserved. This information is not intended as a substitute for professional medical care. Always follow your healthcare professional's instructions.          Counselors:     [facility]: [phone]    Jada Arrieta: [phone]    Jackie Salazar: [phone]    Cristhian Arrieta CNP, [facility]: [phone]    Soo Estevez: [phone]    [facility]: [phone]    Maxi Becker: [phone]    [phone]    [facility]: [phone]    Tabitha Goldberg: [phone]    [facility]: [phone]    Adolescent Counseling:   [facility]: [phone]    [facility]: [phone]    [facility]: [phone]

## 2021-09-17 NOTE — NURSING NOTE
"Chief Complaint   Patient presents with     Recheck Medication   Patient is here to discuss her medication. Patient would like to discuss anxiety and depression medication. Patient increased her dose with extra medication at home. She is almost out of the extra medication at home.    Initial /82   Pulse 106   Temp 99.1  F (37.3  C) (Tympanic)   Resp 20   Wt 97 kg (213 lb 12.8 oz)   LMP 08/16/2021 (Exact Date)   SpO2 98%   Breastfeeding No   BMI 36.70 kg/m   Estimated body mass index is 36.7 kg/m  as calculated from the following:    Height as of 9/11/21: 1.626 m (5' 4\").    Weight as of this encounter: 97 kg (213 lb 12.8 oz).  Medication Reconciliation: complete    FOOD SECURITY SCREENING QUESTIONS  Hunger Vital Signs:  Within the past 12 months we worried whether our food would run out before we got money to buy more. Never  Within the past 12 months the food we bought just didn't last and we didn't have money to get more. Never      Advanced Care Directive Reviewed    Krystian Sosa LPN  "

## 2021-09-17 NOTE — PROGRESS NOTES
"Nursing Notes:   Krystian Sosa LPN  9/17/2021  1:51 PM  Signed  Chief Complaint   Patient presents with     Recheck Medication   Patient is here to discuss her medication. Patient would like to discuss anxiety and depression medication. Patient increased her dose with extra medication at home. She is almost out of the extra medication at home.    Initial /82   Pulse 106   Temp 99.1  F (37.3  C) (Tympanic)   Resp 20   Wt 97 kg (213 lb 12.8 oz)   LMP 08/16/2021 (Exact Date)   SpO2 98%   Breastfeeding No   BMI 36.70 kg/m   Estimated body mass index is 36.7 kg/m  as calculated from the following:    Height as of 9/11/21: 1.626 m (5' 4\").    Weight as of this encounter: 97 kg (213 lb 12.8 oz).  Medication Reconciliation: complete    FOOD SECURITY SCREENING QUESTIONS  Hunger Vital Signs:  Within the past 12 months we worried whether our food would run out before we got money to buy more. Never  Within the past 12 months the food we bought just didn't last and we didn't have money to get more. Never      Advanced Care Directive Reviewed    Krystian Sosa LPN      HPI:    Hodan Iniguez is a 41 year old female who presents for recheck. Patient was recently found to be pregnant. Here to discuss her medications. Wanting to discuss her anxiety and depression medication. Patient states that she increase the dose of her sertraline. Did well with increase. Previously was taking 100 mg of Zoloft. Increased up to 150 mg and noticed improvement in mood. Wondering if this is okay. No suicidal or homicidal ideation.    Patient would like to be screened for STDs. Asymptomatic. No known exposures.    Patient is history of hypothyroidism. Needs medication refill. Patient had appropriate TSH level on 4/8/2021.    Past Medical History:   Diagnosis Date     Anxiety disorder     No Comments Provided     Gastro-esophageal reflux disease without esophagitis     No Comments Provided     Hypothyroidism     No Comments " Provided     Migraine        Past Surgical History:   Procedure Laterality Date     CHOLECYSTECTOMY      No Comments Provided       Family History   Problem Relation Age of Onset     Prostate Cancer Father         Cancer-prostate     Hypertension Father         Hypertension     Hypertension Mother         Hypertension     Mental Illness Mother         Mental illness,sees a therapist     Infertility Sister      Thyroid Disease Sister      Infertility Sister      Thyroid Disease Sister      Cancer Paternal Grandmother 80        Cancer,lung cancer smoker     Mental Illness Brother         Mental illness,anger, therapy       Social History     Tobacco Use     Smoking status: Never Smoker     Smokeless tobacco: Never Used   Substance Use Topics     Alcohol use: Yes     Alcohol/week: 0.0 standard drinks     Comment: Alcoholic Drinks/day: social       Current Outpatient Medications   Medication Sig Dispense Refill     cefdinir (OMNICEF) 300 MG capsule Take 1 capsule (300 mg) by mouth 2 times daily for 10 days 20 capsule 0     levothyroxine (SYNTHROID/LEVOTHROID) 125 MCG tablet Take 1 tablet (125 mcg) by mouth daily 90 tablet 3     metFORMIN (GLUCOPHAGE) 500 MG tablet TAKE 1 TABLET(500 MG) BY MOUTH BID with meals 180 tablet 3     ofloxacin (OCUFLOX) 0.3 % ophthalmic solution Place 1-2 drops Into the left ear 4 times daily for 10 days 4 mL 0     Prenatal Vit-Fe Fumarate-FA (PRENATAL MULTIVITAMIN W/IRON) 27-0.8 MG tablet Take 1 tablet by mouth daily 90 tablet 3     sertraline (ZOLOFT) 100 MG tablet Take 1.5 tablets (150 mg) by mouth daily 135 tablet 3     SUMAtriptan (IMITREX) 50 MG tablet Take 1 tablet (50 mg) by mouth at onset of headache for migraine May repeat in 2 hours. Max 4 tablets/24 hours. 12 tablet 11       No Known Allergies    REVIEW OF SYSTEMS:  Refer to HPI.    EXAM:   Vitals:    /82   Pulse 106   Temp 99.1  F (37.3  C) (Tympanic)   Resp 20   Wt 97 kg (213 lb 12.8 oz)   LMP 08/16/2021 (Exact Date)    SpO2 98%   Breastfeeding No   BMI 36.70 kg/m      General Appearance: Pleasant, alert, appropriate appearance for age. No acute distress  Chest/Respiratory Exam: Normal chest wall and respirations. Clear to auscultation.  Cardiovascular Exam: Regular rate and rhythm. S1, S2, no murmur, click, gallop, or rubs.  Gastrointestinal Exam: Soft, non-tender, no masses or organomegaly. Normal BS x 4.  Genitourinary Exam Female: External genitalia, vulva and vagina appear normal.   Skin: no rash or abnormalities  Psychiatric Exam: Alert and oriented - appropriate affect.    PHQ Depression Screen  PHQ-9 SCORE 5/6/2019 4/8/2021 9/17/2021   PHQ-9 Total Score 7 9 12       ASSESSMENT AND PLAN:      ICD-10-CM    1. Moderate episode of recurrent major depressive disorder (H)  F33.1 sertraline (ZOLOFT) 100 MG tablet   2. Hypothyroidism, unspecified type  E03.9 levothyroxine (SYNTHROID/LEVOTHROID) 125 MCG tablet   3. Pregnancy test positive  Z32.01 Prenatal Vit-Fe Fumarate-FA (PRENATAL MULTIVITAMIN W/IRON) 27-0.8 MG tablet   4. Screen for STD (sexually transmitted disease)  Z11.3 Wet Prep, Genital     GC/Chlamydia by PCR     Hypothyroidism: Refilled levothyroxine. No acute concerns at this time.    Positive pregnancy:  -Take prenatal multivitamin daily. Refilled medication.  -Encouraged increase of fluids and a balanced diet.    -Encouraged routine exercise.    -Abstain from alcohol, smoking and illicit drug use.   -Avoid taking medications like ibuprofen, advil, aspirin and aleve. You may take tylenol for pain.   -Limit caffeine consumption to less than 200 to 300 mg per day .    Completed STD testing with vaginal wet prep, gonorrhea, and chlamydia. Results are pending.    Depression:  Continue sertraline 150 mg daily. Gave side effect profile.  Encouraged good diet and exercise.   Encouraged to see a counselor.   Return to clinic with change/worsening of symptoms.       Patient Instructions     -Take prenatal multivitamin daily.     -Encouraged increase of fluids and a balanced diet.    -Encouraged routine exercise.    -Abstain from alcohol, smoking and illicit drug use.   -Avoid taking medications like ibuprofen, advil, aspirin and aleve. You may take tylenol for pain.   -Limit caffeine consumption to less than 200 to 300 mg per day .      Continue sertraline 150 mg daily.  Encouraged good diet and exercise.   Encouraged to see a counselor.   Return to clinic with change/worsening of symptoms.       Suicide Emergency First call for help:  [phone]    Depression: Tips to Help Yourself  As your healthcare providers help treat your depression, you can also help yourself. Keep in mind that your illness affects you emotionally, physically, mentally, and socially. So full recovery will take time. Take care of your body and your soul, and be patient with yourself as you get better.    Self-care    Educate yourself. Read about treatment and medicine options. If you have the energy, attend local conferences or support groups. Keep a list of useful websites and helpful books and use them as needed. This illness is not your fault. Don t blame yourself for your depression.    Manage early symptoms. If you notice symptoms returning, experience triggers, or identify other factors that may lead to a depressive episode, get help as soon as possible. Ask trusted friends and family to monitor your behavior and let you know if they see anything of concern.    Work with your provider. Find a provider you can trust. Communicate honestly with that person and share information on your treatment for depression and your reaction to medicines.    Be prepared for a crisis. Know what to do if you experience a crisis. Keep the phone number of a crisis hotline and know the location of your community's urgent care centers and the closest emergency department.    Hold off on big decisions. Depression can cloud your judgment. So wait until you feel  better before making major life decisions, such as changing jobs, moving, or getting  or .    Be patient. Recovering from depression is a process. Don t be discouraged if it takes some time to feel better.    Keep it simple. Depression saps your energy and concentration. So you won t be able to do all the things you used to do. Set small goals and do what you can.    Be with others. Don t isolate yourself--you ll only feel worse. Try to be with other people. And take part in fun activities when you can. Go to a movie, elicitme, [employer] service, or social event. Talk openly with people you can trust. And accept help when it s offered.  Take care of your body  People with depression often lose the desire to take care of themselves. That only makes their problems worse. During treatment and afterward, make a point to:    Exercise. It s a great way to take care of your body. And studies have shown that exercise helps fight depression.    Avoid drugs and alcohol. These may ease the pain in the short term. But they ll only make your problems worse in the long run.    Get relief from stress. Ask your healthcare provider for relaxation exercises and techniques to help relieve stress.    Eat right. A balanced and healthy diet helps keep your body healthy.  Date Last Reviewed: 1/1/2017 2000-2017 [employer]. [address] [place] [address], [place] [address]. All rights reserved. This information is not intended as a substitute for professional medical care. Always follow your healthcare professional's instructions.         Treating Anxiety Disorders with Therapy    If you have an anxiety disorder, you don t have to suffer anymore. Treatment is available. Therapy (also called counseling) is often a helpful treatment for anxiety disorders. With therapy, a specially trained professional (therapist) helps you face and learn to manage your anxiety. Therapy can be short-term or long-term depending on your  needs. In some cases, medicine may also be prescribed with therapy. It may take time before you notice how much therapy is helping, but stick with it. With therapy, you can feel better.  Cognitive behavioral therapy (CBT)  Cognitive behavioral therapy (CBT) teaches you to manage anxiety. It does this by helping you understand how you think and act when you re anxious. Research has shown CBT to be a very effective treatment for anxiety disorders. How CBT is run is almost like a class. It involves homework and activities to build skills that teach you to cope with anxiety step by step. It can be done in a group or one-on-one, and often takes place for a set number of sessions. CBT has two main parts:    Cognitive therapy helps you identify the negative, irrational thoughts that occur with your anxiety. You ll learn to replace these with more positive, realistic thoughts.    Behavioral therapy helps you change how you react to anxiety. You ll learn coping skills and methods for relaxing to help you better deal with anxiety.  Other forms of therapy  Other therapy methods may work better for you than CBT. Or, you may move from CBT to another form of therapy as your treatment needs change. This may mean meeting with a therapist by yourself or in a group. Therapy can also help you work through problems in your life, such as drug or alcohol dependence, that may be making your anxiety worse.  Getting better takes time  Therapy will help you feel better and teach you skills to help manage anxiety long term. But change doesn t happen right away. It takes a commitment from you. And treatment only works if you learn to face the causes of your anxiety. So, you might feel worse before you feel better. This can sometimes make it hard to stick with it. But remember: Therapy is a very effective treatment. The results will be well worth it.  Helping yourself  If anxiety is wearing you down, here are some things you can do to  cope:    Check with your doctor and rule out any physical problems that may be causing the anxiety symptoms.    If an anxiety disorder is diagnosed, seek mental healthcare. This is an illness and it can respond to treatment. Most types of anxiety disorders will respond to talk therapy and medicine.    Educate yourself about anxiety disorders. Keep track of helpful online resources and books you can use during stressful periods.    Try stress management techniques such as meditation.    Consider online or in-person support groups.    Don t fight your feelings. Anxiety feeds itself. The more you worry about it, the worse it gets. Instead, try to identify what might have triggered your anxiety. Then try to put this threat in perspective.    Keep in mind that you can t control everything about a situation. Change what you can and let the rest take its course.    Exercise -- it s a great way to relieve tension and help your body feel relaxed.    Examine your life for stress, and try to find ways to reduce it.    Avoid caffeine and nicotine, which can make anxiety symptoms worse.    Fight the temptation to turn to alcohol or unprescribed drugs for relief. They only make things worse in the long run.   Date Last Reviewed: 2017-2017 [employer]. [address] [place] [address]. All rights reserved. This information is not intended as a substitute for professional medical care. Always follow your healthcare professional's instructions.          Counselors:     [facility]: [phone]    Jada Arrieta: [phone]    Jackie Martin: [phone]    [facility]risthian Arrieta [facility]: [phone]    Soo Estevez: [phone]    [facility]: [phone]    Maxi Becker: [phone]    [phone]    [facility]: [phone]    Tabitha Goldberg: [phone]    [facility]:  [phone]    Adolescent Counseling:   [facility]: [phone]    [facility]: [phone]    [facility]: [phone]           Toña Beckham PA-C PA-C..................9/17/2021 1:58 PM

## 2021-09-18 ASSESSMENT — ANXIETY QUESTIONNAIRES: GAD7 TOTAL SCORE: 16

## 2021-09-20 ENCOUNTER — MYC MEDICAL ADVICE (OUTPATIENT)
Dept: FAMILY MEDICINE | Facility: OTHER | Age: 41
End: 2021-09-20

## 2021-09-21 ENCOUNTER — MYC MEDICAL ADVICE (OUTPATIENT)
Dept: FAMILY MEDICINE | Facility: OTHER | Age: 41
End: 2021-09-21

## 2021-09-24 ENCOUNTER — MYC MEDICAL ADVICE (OUTPATIENT)
Dept: FAMILY MEDICINE | Facility: OTHER | Age: 41
End: 2021-09-24

## 2021-09-29 ENCOUNTER — ALLIED HEALTH/NURSE VISIT (OUTPATIENT)
Dept: OBGYN | Facility: OTHER | Age: 41
End: 2021-09-29
Attending: PHYSICIAN ASSISTANT
Payer: COMMERCIAL

## 2021-09-29 VITALS — HEIGHT: 64 IN | WEIGHT: 208 LBS | BODY MASS INDEX: 35.51 KG/M2

## 2021-09-29 DIAGNOSIS — O36.80X0 ENCOUNTER TO DETERMINE FETAL VIABILITY OF PREGNANCY, SINGLE OR UNSPECIFIED FETUS: Primary | ICD-10-CM

## 2021-09-29 DIAGNOSIS — Z32.01 PREGNANCY TEST POSITIVE: ICD-10-CM

## 2021-09-29 PROCEDURE — 99207 PR OB VISIT-NO CHARGE - GICH ONLY: CPT

## 2021-09-29 ASSESSMENT — PATIENT HEALTH QUESTIONNAIRE - PHQ9
10. IF YOU CHECKED OFF ANY PROBLEMS, HOW DIFFICULT HAVE THESE PROBLEMS MADE IT FOR YOU TO DO YOUR WORK, TAKE CARE OF THINGS AT HOME, OR GET ALONG WITH OTHER PEOPLE: NOT DIFFICULT AT ALL
SUM OF ALL RESPONSES TO PHQ QUESTIONS 1-9: 3
SUM OF ALL RESPONSES TO PHQ QUESTIONS 1-9: 3

## 2021-09-29 ASSESSMENT — MIFFLIN-ST. JEOR: SCORE: 1588.48

## 2021-09-29 NOTE — NURSING NOTE
HPI:    This is a 41 year old female patient,  who presents today for OB Intake visit. Patient had a positive pregnancy in clinic.     Obstetrical history and OB Questionnaire updated to the best of this nurse's ability based on patient report. PHQ-9 depression screening and routine Domestic Abuse screening completed. All immediate questions and concerns answered.    FOOD SECURITY SCREENING QUESTIONS  Hunger Vital Signs:  Within the past 12 months we worried whether our food would run out before we got money to buy more. Never  Within the past 12 months the food we bought just didn't last and we didn't have money to get more. Never    Last menstrual period is reported as Patient's last menstrual period was 2021 (exact date). ELEN based on LMP is Estimated Date of Delivery: May 23, 2022.  Her cycles are regular.  Her last menstrual period was normal.   Since her LMP, she has experienced  nausea, headache, vaginal discharge, pelvic pain and back pain.       OBSTETRIC HISTORY:    OB History    Para Term  AB Living   1 0 0 0 0 0   SAB TAB Ectopic Multiple Live Births   0 0 0 0 0      # Outcome Date GA Lbr Rommel/2nd Weight Sex Delivery Anes PTL Lv   1 Current                Age of first pregnancy: 41 (current)  Previous OB Provider:   Previous Delivering Clinic:   Release of Records:     Current delivery plan: [facility]  Preferred OB Provider: any  Current Primary Care Provider: Toña Beckham PA-C   Pediatrician:     Additional History: was told she may have PCOS. Tried to get pregnant for years and thought she couldn't. Family history of muscular dystrophy, autism and Down's syndrome    Have you travelled during the pregnancy?No  Have your sexual partner(s) travelled during the pregnancy?No      HISTORY:   Planned Pregnancy: No  Marital Status: Single  Occupation: Retail at [employer]  Living in Household: Parents and aunt    Father of the baby is involved.   Father of baby is occasionally  supportive of current pregnancy.  Past Medical History of Father of Baby:No significant medical history    Past History:  Her past medical history   Past Medical History:   Diagnosis Date     Anxiety disorder     No Comments Provided     Gastro-esophageal reflux disease without esophagitis     No Comments Provided     Hypothyroidism     No Comments Provided     Migraine    .      Her past surgical history:   Past Surgical History:   Procedure Laterality Date     CHOLECYSTECTOMY      No Comments Provided       She has a history of  no prior pregnancies    Since her LMP she admits to the use of alcohol prior to positive test.    Pap smear history: YES - updated in Problem List and Health Maintenance accordingly    STD/STI history: Chlamydia - resolved    STD/STI symptoms: discharge, itching and feels raw     Past medical, surgical, social and family history were reviewed and updated in EPIC.    Medications reviewed by this nurse. Current medication list:  Current Outpatient Medications   Medication Sig Dispense Refill     levothyroxine (SYNTHROID/LEVOTHROID) 125 MCG tablet Take 1 tablet (125 mcg) by mouth daily 90 tablet 3     metFORMIN (GLUCOPHAGE) 500 MG tablet TAKE 1 TABLET(500 MG) BY MOUTH BID with meals 180 tablet 3     Prenatal Vit-Fe Fumarate-FA (PRENATAL MULTIVITAMIN W/IRON) 27-0.8 MG tablet Take 1 tablet by mouth daily 90 tablet 3     sertraline (ZOLOFT) 100 MG tablet Take 1.5 tablets (150 mg) by mouth daily 135 tablet 3     The following medications were recommended to be discontinued due to Pregnancy Category D status: none  Patient informed to contact her primary care provider as soon as possible to discuss a safer alternative.    Risk factors:  Moderate and moderately severe risks (consult with OB/Gyn)  Previous fetal or  demise: No  History of  delivery: No  History of heart disease Class I: No  Severe anemia, unresponsive to iron therapy: No  Pelvic mass or neoplasm: No  Previous  : No  Hyper/hypothyroidism: Yes  History of postpartum hemorrhage requiring transfusion:No  History of Placenta Accreta: No    High Risk (Pregnancy managed by OB/Gyn)  Multiple pregnancy: No  Pre-gestational diabetes: No  Chronic Hypertension: No  Renal Failure: No  Heart disease, class II or greater: No  Rh Isoimmunization: No  Chronic active hepatitis: No  Convulsive disorder, poorly controlled: No  Isoimmune thrombocytopenia: No  Pre-term premature rupture of membranes: No  Lupus or other autoimmune disorder: No  Human Immunodeficiency Virus: No      hCG Urine Qualitative   Date Value Ref Range Status   2021 Positive (A) Negative Final     Comment:     This test is for screening purposes.  Results should be interpreted along with the clinical picture.  Confirmation testing is available if warranted by ordering [id number], HCG Quantitative Pregnancy.       ASSESSMENT/PLAN:       ICD-10-CM    1. Encounter to determine fetal viability of pregnancy, single or unspecified fetus  O36.80X0    2. Pregnancy test positive  Z32.01 Ob/Gyn Referral       41 year old , 6w2d of pregnancy with ELEN of 2022, by Last Menstrual Period    Per standing orders and scope of practice of this nurse, patient will have the following orders placed and completed prior to initial OB visit with the appropriate provider:    --early ultrasound for dating and viability ordered for 6+ weeks gestation based on LMP    Counseling given:     - Recommended weight gain for pregnancy: < 15 lbs.   BMI < 18.5  28-40 lbs   18.5 - 24.9 25-35   25 - 29.9 15-25   > 30  < 15       PLAN/PATIENT INSTRUCTIONS:    Follow up in 2-4 weeks.  Normal exercise.  Normal sexual activity.  Prenatal vitamins.  Anticipated weight gain.    follow-up appointment with Dr. Sasha Keller for pre- care, take multivitamin or pre- vitamins and OB Education packet given    Cristina Shay RN.................................................. 2021  1:03 PM

## 2021-09-30 ASSESSMENT — PATIENT HEALTH QUESTIONNAIRE - PHQ9: SUM OF ALL RESPONSES TO PHQ QUESTIONS 1-9: 3

## 2021-10-04 ENCOUNTER — HOSPITAL ENCOUNTER (OUTPATIENT)
Dept: ULTRASOUND IMAGING | Facility: OTHER | Age: 41
Discharge: HOME | End: 2021-10-04
Attending: STUDENT IN AN ORGANIZED HEALTH CARE EDUCATION/TRAINING PROGRAM | Admitting: STUDENT IN AN ORGANIZED HEALTH CARE EDUCATION/TRAINING PROGRAM
Payer: COMMERCIAL

## 2021-10-04 DIAGNOSIS — O36.80X0 ENCOUNTER TO DETERMINE FETAL VIABILITY OF PREGNANCY, SINGLE OR UNSPECIFIED FETUS: ICD-10-CM

## 2021-10-04 PROCEDURE — 76801 OB US < 14 WKS SINGLE FETUS: CPT

## 2021-10-09 ENCOUNTER — HEALTH MAINTENANCE LETTER (OUTPATIENT)
Age: 41
End: 2021-10-09

## 2021-10-12 ENCOUNTER — PRENATAL OFFICE VISIT (OUTPATIENT)
Dept: OBGYN | Facility: OTHER | Age: 41
End: 2021-10-12
Attending: STUDENT IN AN ORGANIZED HEALTH CARE EDUCATION/TRAINING PROGRAM
Payer: COMMERCIAL

## 2021-10-12 VITALS
SYSTOLIC BLOOD PRESSURE: 132 MMHG | DIASTOLIC BLOOD PRESSURE: 84 MMHG | BODY MASS INDEX: 35.89 KG/M2 | RESPIRATION RATE: 16 BRPM | WEIGHT: 209.1 LBS | HEART RATE: 92 BPM

## 2021-10-12 DIAGNOSIS — E03.9 HYPOTHYROIDISM AFFECTING PREGNANCY IN FIRST TRIMESTER: Primary | ICD-10-CM

## 2021-10-12 DIAGNOSIS — O99.281 HYPOTHYROIDISM AFFECTING PREGNANCY IN FIRST TRIMESTER: ICD-10-CM

## 2021-10-12 DIAGNOSIS — Z34.01 ENCOUNTER FOR SUPERVISION OF NORMAL FIRST PREGNANCY IN FIRST TRIMESTER: Primary | ICD-10-CM

## 2021-10-12 DIAGNOSIS — O09.521 MULTIGRAVIDA OF ADVANCED MATERNAL AGE IN FIRST TRIMESTER: ICD-10-CM

## 2021-10-12 DIAGNOSIS — O99.281 HYPOTHYROIDISM AFFECTING PREGNANCY IN FIRST TRIMESTER: Primary | ICD-10-CM

## 2021-10-12 DIAGNOSIS — E03.9 HYPOTHYROIDISM AFFECTING PREGNANCY IN FIRST TRIMESTER: ICD-10-CM

## 2021-10-12 LAB
ABO/RH(D): NORMAL
ALBUMIN UR-MCNC: NEGATIVE MG/DL
ANTIBODY SCREEN: NEGATIVE
APPEARANCE UR: CLEAR
BACTERIA #/AREA URNS HPF: ABNORMAL /HPF
BILIRUB UR QL STRIP: NEGATIVE
C TRACH DNA SPEC QL PROBE+SIG AMP: NEGATIVE
COLOR UR AUTO: YELLOW
ERYTHROCYTE [DISTWIDTH] IN BLOOD BY AUTOMATED COUNT: 14.4 % (ref 10–15)
GLUCOSE UR STRIP-MCNC: NEGATIVE MG/DL
HCT VFR BLD AUTO: 41.1 % (ref 35–47)
HGB BLD-MCNC: 13.3 G/DL (ref 11.7–15.7)
HGB UR QL STRIP: NEGATIVE
KETONES UR STRIP-MCNC: NEGATIVE MG/DL
LEUKOCYTE ESTERASE UR QL STRIP: NEGATIVE
MCH RBC QN AUTO: 29.4 PG (ref 26.5–33)
MCHC RBC AUTO-ENTMCNC: 32.4 G/DL (ref 31.5–36.5)
MCV RBC AUTO: 91 FL (ref 78–100)
MUCOUS THREADS #/AREA URNS LPF: PRESENT /LPF
N GONORRHOEA DNA SPEC QL NAA+PROBE: NEGATIVE
NITRATE UR QL: NEGATIVE
PH UR STRIP: 5.5 [PH] (ref 5–9)
PLATELET # BLD AUTO: 359 10E3/UL (ref 150–450)
RBC # BLD AUTO: 4.53 10E6/UL (ref 3.8–5.2)
RBC URINE: 0 /HPF
SP GR UR STRIP: 1.02 (ref 1–1.03)
SPECIMEN EXPIRATION DATE: NORMAL
SQUAMOUS EPITHELIAL: 1 /HPF
T3 SERPL-MCNC: 111 NG/DL (ref 87–178)
T4 FREE SERPL-MCNC: 0.61 NG/DL (ref 0.6–1.6)
TSH SERPL DL<=0.005 MIU/L-ACNC: 32.19 MU/L (ref 0.4–4)
UROBILINOGEN UR STRIP-MCNC: NORMAL MG/DL
WBC # BLD AUTO: 14.6 10E3/UL (ref 4–11)
WBC URINE: 1 /HPF

## 2021-10-12 PROCEDURE — 84443 ASSAY THYROID STIM HORMONE: CPT | Mod: ZL | Performed by: STUDENT IN AN ORGANIZED HEALTH CARE EDUCATION/TRAINING PROGRAM

## 2021-10-12 PROCEDURE — 86803 HEPATITIS C AB TEST: CPT | Mod: ZL | Performed by: STUDENT IN AN ORGANIZED HEALTH CARE EDUCATION/TRAINING PROGRAM

## 2021-10-12 PROCEDURE — 84439 ASSAY OF FREE THYROXINE: CPT | Mod: ZL | Performed by: STUDENT IN AN ORGANIZED HEALTH CARE EDUCATION/TRAINING PROGRAM

## 2021-10-12 PROCEDURE — 87086 URINE CULTURE/COLONY COUNT: CPT | Mod: ZL | Performed by: STUDENT IN AN ORGANIZED HEALTH CARE EDUCATION/TRAINING PROGRAM

## 2021-10-12 PROCEDURE — 87340 HEPATITIS B SURFACE AG IA: CPT | Mod: ZL | Performed by: STUDENT IN AN ORGANIZED HEALTH CARE EDUCATION/TRAINING PROGRAM

## 2021-10-12 PROCEDURE — 86780 TREPONEMA PALLIDUM: CPT | Mod: ZL | Performed by: STUDENT IN AN ORGANIZED HEALTH CARE EDUCATION/TRAINING PROGRAM

## 2021-10-12 PROCEDURE — 86762 RUBELLA ANTIBODY: CPT | Mod: ZL | Performed by: STUDENT IN AN ORGANIZED HEALTH CARE EDUCATION/TRAINING PROGRAM

## 2021-10-12 PROCEDURE — 36415 COLL VENOUS BLD VENIPUNCTURE: CPT | Mod: ZL | Performed by: STUDENT IN AN ORGANIZED HEALTH CARE EDUCATION/TRAINING PROGRAM

## 2021-10-12 PROCEDURE — 99207 PR OB VISIT-NO CHARGE - GICH ONLY: CPT | Performed by: STUDENT IN AN ORGANIZED HEALTH CARE EDUCATION/TRAINING PROGRAM

## 2021-10-12 PROCEDURE — 84480 ASSAY TRIIODOTHYRONINE (T3): CPT | Mod: ZL | Performed by: STUDENT IN AN ORGANIZED HEALTH CARE EDUCATION/TRAINING PROGRAM

## 2021-10-12 PROCEDURE — 81001 URINALYSIS AUTO W/SCOPE: CPT | Mod: ZL | Performed by: STUDENT IN AN ORGANIZED HEALTH CARE EDUCATION/TRAINING PROGRAM

## 2021-10-12 PROCEDURE — 86900 BLOOD TYPING SEROLOGIC ABO: CPT | Mod: ZL | Performed by: STUDENT IN AN ORGANIZED HEALTH CARE EDUCATION/TRAINING PROGRAM

## 2021-10-12 PROCEDURE — 87491 CHLMYD TRACH DNA AMP PROBE: CPT | Mod: ZL | Performed by: STUDENT IN AN ORGANIZED HEALTH CARE EDUCATION/TRAINING PROGRAM

## 2021-10-12 PROCEDURE — 85027 COMPLETE CBC AUTOMATED: CPT | Mod: ZL | Performed by: STUDENT IN AN ORGANIZED HEALTH CARE EDUCATION/TRAINING PROGRAM

## 2021-10-12 PROCEDURE — 87389 HIV-1 AG W/HIV-1&-2 AB AG IA: CPT | Mod: ZL | Performed by: STUDENT IN AN ORGANIZED HEALTH CARE EDUCATION/TRAINING PROGRAM

## 2021-10-12 RX ORDER — LEVOTHYROXINE SODIUM 150 UG/1
150 TABLET ORAL DAILY
Qty: 30 TABLET | Refills: 0 | Status: SHIPPED | OUTPATIENT
Start: 2021-10-12 | End: 2021-12-10

## 2021-10-12 ASSESSMENT — PAIN SCALES - GENERAL: PAINLEVEL: MILD PAIN (2)

## 2021-10-12 NOTE — PROGRESS NOTES
New OB Visit    Chief Complaint: Establish care for a new pregnancy    History of Present Illness:  Ms. Hodan Iniguez is a 41 year old yo  here today for a new OB visit. She reports her LMP as Patient's last menstrual period was 2021 (exact date). She is sure of this date. She reports early pregnancy symptoms including nausea &/or vomiting. She IS taking prenatal vitamins at this time. This pregnancy was not planned, but welcomed. The patient reports that she is excited about the pregnancy. Father of the baby, is not currently involved.  We discussed some warning signs to be alert for that could suggest spontaneous miscarriage including vaginal bleeding, cramping as well as what symptoms should prompt medical evaluation in clinic or the ER.     We also discussed genetic screening including [place] non-invasive testing, carrier screening for SMA and CF and the [place] screen. All of these tests were offered to the family and they have decided [place], CF, SMA at this time.     Medical History:  Past Medical History:   Diagnosis Date     Anxiety disorder     No Comments Provided     Depressive disorder      Gastro-esophageal reflux disease without esophagitis     No Comments Provided     Hypothyroidism     No Comments Provided     Infertility, female     Never had a full work up, thought she may have PCOS     Migraines      Varicella     as a child     She denies any chronic medical conditions: specifically denies asthma, HTN, DM    Obstetric History:       GYN History:  History of chlamydia many years ago, no complications  Last pap smear: May 2019 NIL, HPV negative (due May 2024)  No history of abnormal pap smears    Past Surgical History:  Past Surgical History:   Procedure Laterality Date     CHOLECYSTECTOMY      No Comments Provided       Family Medical History:  Family History   Problem Relation Age of Onset     Hypertension Mother         Hypertension     Mental Illness Mother         Mental  illness,sees a therapist     Other - See Comments Mother         MTHFR     Cancer Mother         uterine/endometrial     Prostate Cancer Father         Cancer-prostate     Hypertension Father         Hypertension     Infertility Sister      Thyroid Disease Sister      Infertility Sister      Thyroid Disease Sister      Mental Illness Brother         Mental illness,anger, therapy     Cancer Paternal Grandmother 80        Cancer,lung cancer smoker   Mother: ONESIMO had a PE last year. Leeanna planned for life.    Specifically denies breast, ovarian, endometrial and colon cancers in her family  She also is not aware of any familial thrombophilias and coagulopathies in her family    Medications:  Current Outpatient Medications   Medication     levothyroxine (SYNTHROID/LEVOTHROID) 125 MCG tablet     metFORMIN (GLUCOPHAGE) 500 MG tablet     Prenatal Vit-Fe Fumarate-FA (PRENATAL MULTIVITAMIN W/IRON) 27-0.8 MG tablet     sertraline (ZOLOFT) 100 MG tablet     No current facility-administered medications for this visit.       Allergies:   No Known Allergies    Social History:  Social History     Tobacco Use     Smoking status: Never Smoker     Smokeless tobacco: Never Used   Vaping Use     Vaping Use: Never used     Passive vaping exposure Yes   Substance Use Topics     Alcohol use: Not Currently     Alcohol/week: 24.0 standard drinks     Types: 24 Cans of beer per week     Drug use: Never   FOB occassionaly vapes in tent/car    She lives at home with her parents, and aunt  No tobacco, alcohol or drug use in this pregnancy    ROS:   Skin: negative for rash, bruising  Eyes: negative for visual blurring, double vision  Ears/Nose/Throat: negative for nasal congestion, vertigo  Respiratory: No shortness of breath, dyspnea on exertion, cough, or hemoptysis  Cardiovascular: negative for palpitations, chest pain, lower extremity edema and syncope or near-syncope  Gastrointestinal: negative for, nausea, vomiting and  hematemesis  Genitourinary: negative for, dysuria, frequency and urgency  Musculoskeletal: negative for, back pain and muscular weakness  Neurologic: negative for, headaches, syncope, seizures and local weakness  Psychiatric: negative for, anxiety, depression and hallucinations  Hematologic/Lymphatic/Immunologic: negative for, anemia, chills and fever      Exam:  /84 (BP Location: Right arm, Patient Position: Sitting, Cuff Size: Adult Large)   Pulse 92   Resp 16   Wt 94.8 kg (209 lb 1.6 oz)   LMP 08/16/2021 (Exact Date)   Breastfeeding No   BMI 35.89 kg/m    General: No acute distress, well-appearing  Neck: Normal thyroid gland on palpation without nodules, enlargement or pain  Breast: Normal appearing skin on breasts bilaterally, No nodules or masses palpated, no nipple discharge or bleeding  Cardiac: Normal rate, regular rhythm, no murmurs, gallops or rubs, normal perfusion to extremities  Lungs: Clear on auscultation, no wheezing, non-labored respirations  Abdomen: Soft, non-tender, no masses  Pelvic exam: Normal appearing external genitalia, normal appearing vaginal walls with pink ruggae. No noted vaginal discharge or lesions. Cervix is closed without masses, nodules, erythema or discharge at the os.     Labs:  HCG Qual Urine   Date Value Ref Range Status   03/04/2020 Negative NEG^Negative Final     Comment:     This test is for screening purposes.  Results should be interpreted along with   the clinical picture.  Confirmation testing is available if warranted by   ordering [id number], HCG Quantitative Pregnancy.       hCG Urine Qualitative   Date Value Ref Range Status   09/11/2021 Positive (A) Negative Final     Comment:     This test is for screening purposes.  Results should be interpreted along with the clinical picture.  Confirmation testing is available if warranted by ordering [id number], HCG Quantitative Pregnancy.   ]    Dating ultrasound: Done    ELEN based on LMP 5/23/2022- ELEN based on US  2022. Per ACOG recommendations these dates are not greater than 5 days different, thus should not be changed based on US. Final ELEN will be 2022 based on LMP=7 week US.    Assessment:  Ms. Hodan Iniguez is a 41 year old yo  here today to establish care for this pregnancy. Her pregnancy is complicated by advanced maternal age, history of materna alcohol use early in pregnancy, maternal hypothyroidism.    Plan:  # Routine Prenatal Care  -- Dating: LMP=7 week US ELEN: 2022  -- PNLs: collected today including the following   CBC   RPR   Hep B S Ag   Hep C   Rubella   HIV   ABO/Rh type   Antibody Screen    -- Genetic Screening: Discussed with patient, she has decided on [place], SMA and CF  -- Anatomy US: Planned for approximately 20 weeks gestation. Plan for level II due to AMA and history of heavy alcohol use before finding out she was pregnant  -- Immunizations: Plans for influenza and Tdap at approximately 27 weeks gestation  -- 3rd TM labs including CBC, RPR: Planned for after 27 weeks gestation  -- 1 hr GTT: Planned for  24-28 weeks   -- GBS: Planned for 36 weeks  -- Postpartum Planning: To be discussed   -- Delivery Planning: AMA to be delivered by her due date is the goal  -- Return to clinic in 4 weeks for OB follow up visit  -- Planning to do at next visit: Follow up labs, collect genetic screening labs    # Hypothyroidism  -- Levothyroxine 125mcg/day  -- Check TSH q trimester    # Advanced Maternal Age  -- will begin  testing with weekly BPPs at 36 weeks  -- Plan for growth US at 36 weeks  -- Plan for IOL between 39w0d and 40w0d      Sasha Keller MD  OB/GYN  10/12/2021 3:35 PM

## 2021-10-12 NOTE — NURSING NOTE
"Chief Complaint   Patient presents with     Prenatal Care     8w 1d - OB Px     Patient stated she has constant nausea. Lower abdominal pain- more so while walking at work, but does get some sharp pains. She is also having some depression- some situation, but also due to not feeling well. Can sometimes keep food down. Is able to keep down liquids.    Initial /84 (BP Location: Right arm, Patient Position: Sitting, Cuff Size: Adult Large)   Pulse 92   Resp 16   Wt 94.8 kg (209 lb 1.6 oz)   LMP 08/16/2021 (Exact Date)   Breastfeeding No   BMI 35.89 kg/m   Estimated body mass index is 35.89 kg/m  as calculated from the following:    Height as of 9/29/21: 1.626 m (5' 4\").    Weight as of this encounter: 94.8 kg (209 lb 1.6 oz).  Medication Reconciliation: Completed     Advanced Care Directive Reviewed    Mike Faulkner LPN  "

## 2021-10-13 LAB
HBV SURFACE AG SERPL QL IA: NONREACTIVE
HCV AB SERPL QL IA: NONREACTIVE
HIV 1+2 AB+HIV1 P24 AG SERPL QL IA: NONREACTIVE
T PALLIDUM AB SER QL: NONREACTIVE

## 2021-10-14 ENCOUNTER — HOSPITAL ENCOUNTER (OUTPATIENT)
Dept: ULTRASOUND IMAGING | Facility: OTHER | Age: 41
Discharge: HOME | End: 2021-10-14
Attending: STUDENT IN AN ORGANIZED HEALTH CARE EDUCATION/TRAINING PROGRAM | Admitting: STUDENT IN AN ORGANIZED HEALTH CARE EDUCATION/TRAINING PROGRAM
Payer: COMMERCIAL

## 2021-10-14 DIAGNOSIS — Z34.01 ENCOUNTER FOR SUPERVISION OF NORMAL FIRST PREGNANCY IN FIRST TRIMESTER: ICD-10-CM

## 2021-10-14 DIAGNOSIS — O09.521 MULTIGRAVIDA OF ADVANCED MATERNAL AGE IN FIRST TRIMESTER: ICD-10-CM

## 2021-10-14 LAB
RUBV IGG SERPL QL IA: 3.55 INDEX
RUBV IGG SERPL QL IA: POSITIVE

## 2021-10-14 PROCEDURE — 76801 OB US < 14 WKS SINGLE FETUS: CPT

## 2021-10-15 ENCOUNTER — MYC MEDICAL ADVICE (OUTPATIENT)
Dept: FAMILY MEDICINE | Facility: OTHER | Age: 41
End: 2021-10-15

## 2021-10-15 LAB — BACTERIA UR CULT: NO GROWTH

## 2021-10-20 ENCOUNTER — MYC MEDICAL ADVICE (OUTPATIENT)
Dept: FAMILY MEDICINE | Facility: OTHER | Age: 41
End: 2021-10-20

## 2021-10-28 ENCOUNTER — MYC MEDICAL ADVICE (OUTPATIENT)
Dept: OBGYN | Facility: OTHER | Age: 41
End: 2021-10-28

## 2021-10-28 DIAGNOSIS — O21.9 NAUSEA AND VOMITING IN PREGNANCY: Primary | ICD-10-CM

## 2021-10-28 RX ORDER — PROMETHAZINE HYDROCHLORIDE 25 MG/1
25 TABLET ORAL EVERY 6 HOURS PRN
Qty: 30 TABLET | Refills: 0 | Status: SHIPPED | OUTPATIENT
Start: 2021-10-28 | End: 2021-11-26

## 2021-10-28 RX ORDER — PROCHLORPERAZINE MALEATE 10 MG
10 TABLET ORAL EVERY 6 HOURS PRN
Qty: 30 TABLET | Refills: 0 | Status: SHIPPED | OUTPATIENT
Start: 2021-10-28 | End: 2021-11-26

## 2021-11-11 ENCOUNTER — PRENATAL OFFICE VISIT (OUTPATIENT)
Dept: OBGYN | Facility: OTHER | Age: 41
End: 2021-11-11
Attending: STUDENT IN AN ORGANIZED HEALTH CARE EDUCATION/TRAINING PROGRAM
Payer: COMMERCIAL

## 2021-11-11 VITALS
DIASTOLIC BLOOD PRESSURE: 84 MMHG | WEIGHT: 212.3 LBS | HEART RATE: 89 BPM | RESPIRATION RATE: 16 BRPM | OXYGEN SATURATION: 95 % | SYSTOLIC BLOOD PRESSURE: 124 MMHG | HEIGHT: 64 IN | BODY MASS INDEX: 36.25 KG/M2

## 2021-11-11 DIAGNOSIS — O99.281 HYPOTHYROIDISM AFFECTING PREGNANCY IN FIRST TRIMESTER: Primary | ICD-10-CM

## 2021-11-11 DIAGNOSIS — E03.9 HYPOTHYROIDISM AFFECTING PREGNANCY IN FIRST TRIMESTER: Primary | ICD-10-CM

## 2021-11-11 DIAGNOSIS — Z34.01 ENCOUNTER FOR SUPERVISION OF NORMAL FIRST PREGNANCY IN FIRST TRIMESTER: ICD-10-CM

## 2021-11-11 PROCEDURE — 99207 PR OB VISIT-NO CHARGE - GICH ONLY: CPT | Performed by: STUDENT IN AN ORGANIZED HEALTH CARE EDUCATION/TRAINING PROGRAM

## 2021-11-11 PROCEDURE — 36415 COLL VENOUS BLD VENIPUNCTURE: CPT | Mod: ZL | Performed by: STUDENT IN AN ORGANIZED HEALTH CARE EDUCATION/TRAINING PROGRAM

## 2021-11-11 PROCEDURE — 81220 CFTR GENE COM VARIANTS: CPT | Mod: ZL | Performed by: STUDENT IN AN ORGANIZED HEALTH CARE EDUCATION/TRAINING PROGRAM

## 2021-11-11 PROCEDURE — 84999 UNLISTED CHEMISTRY PROCEDURE: CPT | Mod: ZL | Performed by: STUDENT IN AN ORGANIZED HEALTH CARE EDUCATION/TRAINING PROGRAM

## 2021-11-11 PROCEDURE — 81329 SMN1 GENE DOS/DELETION ALYS: CPT | Mod: ZL | Performed by: STUDENT IN AN ORGANIZED HEALTH CARE EDUCATION/TRAINING PROGRAM

## 2021-11-11 RX ORDER — LEVOTHYROXINE SODIUM 150 UG/1
150 TABLET ORAL DAILY
Qty: 30 TABLET | Refills: 0 | Status: SHIPPED | OUTPATIENT
Start: 2021-11-11 | End: 2022-02-15

## 2021-11-11 ASSESSMENT — MIFFLIN-ST. JEOR: SCORE: 1607.99

## 2021-11-11 NOTE — PROGRESS NOTES
"Return OB Visit    S: Ms. Iniguez is feeling well today. She has no acute concerns. Denies leaking of fluid, vaginal bleeding, painful contractions.     O: /84 (BP Location: Right arm, Patient Position: Sitting, Cuff Size: Adult Regular)   Pulse 89   Resp 16   Ht 1.626 m (5' 4\")   Wt 96.3 kg (212 lb 4.8 oz)   LMP 2021 (Exact Date)   SpO2 95%   Breastfeeding No   BMI 36.44 kg/m    Gen: Well-appearing, NAD  Bedside US shows good fetal movements: 136 bpm    Assessment:  Ms. Hodan Iniguez is a 41 year old yo  here for OB follow up. She is currently 12w3d.  Her pregnancy is complicated by advanced maternal age, history of materna alcohol use early in pregnancy, maternal hypothyroidism.     Plan:  # Routine Prenatal Care  -- Dating: LMP=7 week US ELEN: 2022  -- PNLs: collected today including the following              O Positive, AB screen neg              RPR nr              Hep B S Ag neg              Hep C neg              Rubella immune              HIV neg              GC/Chlam neg     -- Genetic Screening: Discussed with patient, she has decided on [place], SMA and CF: today  -- Anatomy US: Planned for approximately 20 weeks gestation. Plan for level II due to AMA and history of heavy alcohol use before finding out she was pregnant  -- Immunizations: Plans for influenza and Tdap at approximately 27 weeks gestation  -- 3rd TM labs including CBC, RPR: Planned for after 27 weeks gestation  -- 1 hr GTT: Planned for  24-28 weeks   -- GBS: Planned for 36 weeks  -- Postpartum Planning: To be discussed   -- Delivery Planning: AMA to be delivered by her due date is the goal  -- Return to clinic in 4 weeks for OB follow up visit  -- Planning to do at next visit: Follow up genetic labs     # Hypothyroidism  -- Levothyroxine 125mcg/day- alternating with 150 mcg/day  -- Check TSH q trimester     # Advanced Maternal Age  -- will begin  testing with weekly BPPs at 36 weeks  -- Plan for " growth US at 36 weeks  -- Plan for IOL between 39w0d and 40w0d    Sasha Keller MD  OB/GYN  11/11/2021 2:31 PM

## 2021-11-11 NOTE — LETTER
[facility]  [address]  [place] [address]  Phone: [phone]  Fax: [phone]    November 11, 2021        Hodan Iniguez  [address] [facility] [address]          To whom it may concern:    RE: Hodan Iniguez    Please help to accommodate work so that Hodan should be only doing .     Please contact me for questions or concerns.      Sincerely,        RODNEY WEISS MD

## 2021-11-12 LAB
Lab: NORMAL
PERFORMING LABORATORY: NORMAL
SPECIMEN STATUS: NORMAL

## 2021-11-18 LAB — MISCELLANEOUS TEST 1 (ARUP): NORMAL

## 2021-11-22 LAB
PERFORMING LABORATORY: NORMAL
TEST NAME: NORMAL

## 2021-11-23 LAB
CFTR ALLELE 1 BLD/T QL: NEGATIVE
CFTR ALLELE 2 BLD/T QL: NEGATIVE
CFTR MUT ANL BLD/T: NORMAL
CFTR MUT TESTED BLD/T: NORMAL

## 2021-11-25 ENCOUNTER — E-VISIT (OUTPATIENT)
Dept: FAMILY MEDICINE | Facility: OTHER | Age: 41
End: 2021-11-25
Attending: PHYSICIAN ASSISTANT
Payer: COMMERCIAL

## 2021-11-25 DIAGNOSIS — Z20.822 SUSPECTED COVID-19 VIRUS INFECTION: Primary | ICD-10-CM

## 2021-11-26 ENCOUNTER — ALLIED HEALTH/NURSE VISIT (OUTPATIENT)
Dept: FAMILY MEDICINE | Facility: OTHER | Age: 41
End: 2021-11-26
Attending: FAMILY MEDICINE
Payer: COMMERCIAL

## 2021-11-26 DIAGNOSIS — Z20.822 COVID-19 RULED OUT: Primary | ICD-10-CM

## 2021-11-26 PROCEDURE — U0003 INFECTIOUS AGENT DETECTION BY NUCLEIC ACID (DNA OR RNA); SEVERE ACUTE RESPIRATORY SYNDROME CORONAVIRUS 2 (SARS-COV-2) (CORONAVIRUS DISEASE [COVID-19]), AMPLIFIED PROBE TECHNIQUE, MAKING USE OF HIGH THROUGHPUT TECHNOLOGIES AS DESCRIBED BY CMS-2020-01-R: HCPCS | Mod: ZL

## 2021-11-26 PROCEDURE — C9803 HOPD COVID-19 SPEC COLLECT: HCPCS

## 2021-11-26 NOTE — PATIENT INSTRUCTIONS
Dear Hodan Iniguez,    Your symptoms show that you may have coronavirus (COVID-19). This illness can cause fever, cough and trouble breathing. Many people get a mild case and get better on their own. Some people can get very sick.    Will I be tested for COVID-19?  We would like to test you for Covid-19 virus. I have placed orders for this test.     To schedule: go to your [employer] home page and scroll down to the section that says  You have an appointment that needs to be scheduled  and click the large green button that says  Schedule Now  and follow the steps to find the next available openings.    If you are unable to complete these [employer] scheduling steps, please call [phone] to schedule your testing.     Return to work/school/ guidance:  Please let your workplace manager and staffing office know when your quarantine ends     We can t give you an exact date as it depends on the above. You can calculate this on your own or work with your manager/staffing office to calculate this. (For example if you were exposed on 10/4, you would have to quarantine for 14 full days. That would be through 10/18. You could return on 10/19.)      If you receive a positive COVID-19 test result, follow the guidance of the those who are giving you the results. Usually the return to work is 10 (or in some cases 20 days from symptom onset.) If you work at [employer], you must also be cleared by Employee Occupational Health and Safety to return to work.        If you receive a negative COVID-19 test result and did not have a high risk exposure to someone with a known positive COVID-19 test, you can return to work once you're free of fever for 24 hours without fever-reducing medication and your symptoms are improving or resolved.      If you receive a negative COVID-19 test and If you had a high risk exposure to someone who has tested positive for COVID-19 then you can return to work 14 days after your last contact  with the positive individual    Note: If you have ongoing exposure to the covid positive person, this quarantine period may be more than 14 days. (For example, if you are continued to be exposed to your child who tested positive and cannot isolate from them, then the quarantine of 7-14 days can't start until your child is no longer contagious. This is typically 10 days from onset of the child's symptoms. So the total duration may be 17-24 days in this case.)    Sign up for [employer].   We know it's scary to hear that you might have COVID-19. We want to track your symptoms to make sure you're okay over the next 2 weeks. Please look for an email from [employer]--this is a free, online program that we'll use to keep in touch. To sign up, follow the link in the email you will receive. Learn more at [email or web address]    How can I take care of myself?    Get lots of rest. Drink extra fluids (unless a doctor has told you not to)    Take Tylenol (acetaminophen) or ibuprofen for fever or pain. If you have liver or kidney problems, ask your family doctor if it's okay to take Tylenol o ibuprofen    If you have other health problems (like cancer, heart failure, an organ transplant or severe kidney disease): Call your specialty clinic if you don't feel better in the next 2 days.    Know when to call 911. Emergency warning signs include:  o Trouble breathing or shortness of breath  o Pain or pressure in the chest that doesn't go away  o Feeling confused like you haven't felt before, or not being able to wake up  o Bluish-colored lips or face    Where can I get more information?  [facility] - About COVID-19:   [email or web address]    CDC - What to Do If You're Sick:   www.cdc.gov/coronavirus/2019-ncov/about/steps-when-sick.html    November 26, 2021  RE:  Hodan Iniguez                                                                                                                  [id number]  [facility] [address]      To whom it may concern:    I evaluated Hodan Iniguez on November 26, 2021. Hodan Iniguez should be excused from work/school.     They should let their workplace manager and staffing office know when their quarantine ends.    We can not give an exact date as it depends on the information below. They can calculate this on their own or work with their manager/staffing office to calculate this. (For example if they were exposed on 10/04, they would have to quarantine for 14 full days. That would be through 10/18. They could return on 10/19.)    Quarantine Guidelines:      If patient receives a positive COVID-19 test result, they should follow the guidance of those who are giving the results. Usually the return to work is 10 (or in some cases 20 days from symptom onset.) If they work at [employer], they must be cleared by Employee Occupational Health and Safety to return to work.        If patient receives a negative COVID-19 test result and did not have a high risk exposure to someone with a known positive COVID-19 test, they can return to work once they're free of fever for 24 hours without fever-reducing medication and their symptoms are improving or resolved.      If patient receives a negative COVID-19 test and if they had a high risk exposure to someone who has tested positive for COVID-19 then they can return to work 14 days after their last contact with the positive individual    Note: If there is ongoing exposure to the covid positive person, this quarantine period may be longer than 14 days. (For example, if they are continually exposed to their child, who tested positive and cannot isolate from them, then the quarantine of 7-14 days can't start until their child is no longer contagious. This is typically 10 days from onset to the child's symptoms. So the total duration may be 17-24 days in this case.)     Sincerely,  Toña Beckham PA-C

## 2021-11-28 LAB — SARS-COV-2 RNA RESP QL NAA+PROBE: POSITIVE

## 2021-12-09 ENCOUNTER — PRENATAL OFFICE VISIT (OUTPATIENT)
Dept: OBGYN | Facility: OTHER | Age: 41
End: 2021-12-09
Attending: STUDENT IN AN ORGANIZED HEALTH CARE EDUCATION/TRAINING PROGRAM
Payer: COMMERCIAL

## 2021-12-09 VITALS
DIASTOLIC BLOOD PRESSURE: 80 MMHG | HEART RATE: 118 BPM | WEIGHT: 206.6 LBS | SYSTOLIC BLOOD PRESSURE: 122 MMHG | BODY MASS INDEX: 35.46 KG/M2

## 2021-12-09 DIAGNOSIS — Z83.49 FAMILY HISTORY OF MTHFR DEFICIENCY: ICD-10-CM

## 2021-12-09 DIAGNOSIS — Z34.92 SECOND TRIMESTER PREGNANCY: ICD-10-CM

## 2021-12-09 DIAGNOSIS — E03.9 HYPOTHYROIDISM, UNSPECIFIED TYPE: Primary | ICD-10-CM

## 2021-12-09 DIAGNOSIS — O09.522 MULTIGRAVIDA OF ADVANCED MATERNAL AGE IN SECOND TRIMESTER: ICD-10-CM

## 2021-12-09 LAB
T4 FREE SERPL-MCNC: 0.74 NG/DL (ref 0.6–1.6)
TSH SERPL DL<=0.005 MIU/L-ACNC: 16.15 MU/L (ref 0.4–4)

## 2021-12-09 PROCEDURE — 84443 ASSAY THYROID STIM HORMONE: CPT | Mod: ZL | Performed by: STUDENT IN AN ORGANIZED HEALTH CARE EDUCATION/TRAINING PROGRAM

## 2021-12-09 PROCEDURE — 84439 ASSAY OF FREE THYROXINE: CPT | Mod: ZL | Performed by: STUDENT IN AN ORGANIZED HEALTH CARE EDUCATION/TRAINING PROGRAM

## 2021-12-09 PROCEDURE — 36415 COLL VENOUS BLD VENIPUNCTURE: CPT | Mod: ZL | Performed by: STUDENT IN AN ORGANIZED HEALTH CARE EDUCATION/TRAINING PROGRAM

## 2021-12-09 PROCEDURE — 99207 PR OB VISIT-NO CHARGE - GICH ONLY: CPT | Performed by: STUDENT IN AN ORGANIZED HEALTH CARE EDUCATION/TRAINING PROGRAM

## 2021-12-09 NOTE — NURSING NOTE
Pt presents to clinic today for prenatal care 16w3d. Pt is still fatigue but is doing better with nausea.      Medication Reconciliation: complete  Trice Alfaro LPN

## 2021-12-09 NOTE — PROGRESS NOTES
Return OB Visit    S: Ms. Iniguez is feeling well today. She has no acute concerns. Denies leaking of fluid, vaginal bleeding, painful contractions. She Recently has been recovering from COVID    O: /80   Pulse 118   Wt 93.7 kg (206 lb 9.6 oz)   LMP 2021 (Exact Date)   BMI 35.46 kg/m    Gen: Well-appearing, NAD     bpm    Assessment:  Ms. Hodan Iniguez is a 41 year old yo  here for OB follow up. She is currently 16w3d.  Her pregnancy is complicated by advanced maternal age, history of materna alcohol use early in pregnancy, maternal hypothyroidism.     Plan:  # Routine Prenatal Care  -- Dating: LMP=7 week US ELEN: 2022  -- PNLs:              O Positive, AB screen neg              RPR nr              Hep B S Ag neg              Hep C neg              Rubella immune              HIV neg              GC/Chlam neg     -- Genetic Screening: [place] low risk male, SMA negative, CF negative  -- Anatomy US: MFM scan planned for   -- Immunizations: Plans for influenza and Tdap at approximately 27 weeks gestation  -- 3rd TM labs including CBC, RPR: Planned for after 27 weeks gestation  -- 1 hr GTT: Planned for  24-28 weeks   -- GBS: Planned for 36 weeks  -- Postpartum Planning: To be discussed   -- Delivery Planning: AMA to be delivered by her due date is the goal  -- Return to clinic in 4 weeks for OB follow up visit  -- Planning to do at next visit: Follow up MFM     # Hypothyroidism  -- Levothyroxine 150 mcg/day  -- Check TSH q trimester   Ordered today     # Advanced Maternal Age  -- will begin  testing with weekly BPPs at 36 weeks  -- Plan for growth US at 36 weeks  -- Plan for IOL between 39w0d and 40w0d    Sasha Keller MD  OB/GYN  2021 2:43 PM

## 2021-12-10 DIAGNOSIS — O99.281 HYPOTHYROIDISM AFFECTING PREGNANCY IN FIRST TRIMESTER: ICD-10-CM

## 2021-12-10 DIAGNOSIS — E03.9 HYPOTHYROIDISM AFFECTING PREGNANCY IN FIRST TRIMESTER: ICD-10-CM

## 2021-12-10 RX ORDER — LEVOTHYROXINE SODIUM 150 UG/1
150 TABLET ORAL DAILY
Qty: 30 TABLET | Refills: 0 | Status: SHIPPED | OUTPATIENT
Start: 2021-12-10 | End: 2022-01-04

## 2021-12-10 NOTE — TELEPHONE ENCOUNTER
Refill request received from [email or web address]. Pending TSH results review by TAQUERIA. Will send for consideration.     Yanelis Valverde RN on 12/10/2021 at 1:48 PM

## 2021-12-14 ENCOUNTER — TELEPHONE (OUTPATIENT)
Dept: ONCOLOGY | Facility: OTHER | Age: 41
End: 2021-12-14
Payer: COMMERCIAL

## 2021-12-14 DIAGNOSIS — Z83.49 FAMILY HISTORY OF MTHFR DEFICIENCY: Primary | ICD-10-CM

## 2021-12-14 NOTE — TELEPHONE ENCOUNTER
Oncology/Hematology Care Coordination - Referral Review    Referred by:  Dr. Betancur    Diagnosis:  Family history MTHFR     Imaging:  none    Lab:  None related to consult, Zuleyma Simon MD would like hypercoagulation labs prior to consult. Labs ordered per Zuleyma Simon MD     Surgery/Biopsy:  no    Pathology:  no    Outside Records:  No    Patient is currently pregnant and was referred by obstetrician to be evaluated for MTHFR deficiency.     Helen Quinteros RN on 12/14/2021 at 11:40 AM

## 2021-12-21 ENCOUNTER — PRENATAL OFFICE VISIT (OUTPATIENT)
Dept: OBGYN | Facility: OTHER | Age: 41
End: 2021-12-21
Attending: STUDENT IN AN ORGANIZED HEALTH CARE EDUCATION/TRAINING PROGRAM
Payer: COMMERCIAL

## 2021-12-21 ENCOUNTER — OFFICE VISIT (OUTPATIENT)
Dept: MATERNAL FETAL MEDICINE | Facility: CLINIC | Age: 41
End: 2021-12-21
Attending: STUDENT IN AN ORGANIZED HEALTH CARE EDUCATION/TRAINING PROGRAM
Payer: COMMERCIAL

## 2021-12-21 ENCOUNTER — HOSPITAL ENCOUNTER (OUTPATIENT)
Dept: ULTRASOUND IMAGING | Facility: OTHER | Age: 41
End: 2021-12-21
Attending: STUDENT IN AN ORGANIZED HEALTH CARE EDUCATION/TRAINING PROGRAM
Payer: COMMERCIAL

## 2021-12-21 ENCOUNTER — HOSPITAL ENCOUNTER (OUTPATIENT)
Dept: ULTRASOUND IMAGING | Facility: CLINIC | Age: 41
End: 2021-12-21
Attending: STUDENT IN AN ORGANIZED HEALTH CARE EDUCATION/TRAINING PROGRAM
Payer: COMMERCIAL

## 2021-12-21 VITALS
DIASTOLIC BLOOD PRESSURE: 82 MMHG | BODY MASS INDEX: 36.25 KG/M2 | HEART RATE: 102 BPM | WEIGHT: 211.2 LBS | SYSTOLIC BLOOD PRESSURE: 124 MMHG

## 2021-12-21 DIAGNOSIS — Z34.01 ENCOUNTER FOR SUPERVISION OF NORMAL FIRST PREGNANCY IN FIRST TRIMESTER: ICD-10-CM

## 2021-12-21 DIAGNOSIS — O09.512 AMA (ADVANCED MATERNAL AGE) PRIMIGRAVIDA 35+, SECOND TRIMESTER: Primary | ICD-10-CM

## 2021-12-21 DIAGNOSIS — E03.9 HYPOTHYROIDISM AFFECTING PREGNANCY IN FIRST TRIMESTER: Primary | ICD-10-CM

## 2021-12-21 DIAGNOSIS — O99.281 HYPOTHYROIDISM AFFECTING PREGNANCY IN FIRST TRIMESTER: Primary | ICD-10-CM

## 2021-12-21 DIAGNOSIS — O99.281 HYPOTHYROIDISM AFFECTING PREGNANCY IN FIRST TRIMESTER: ICD-10-CM

## 2021-12-21 DIAGNOSIS — O09.521 MULTIGRAVIDA OF ADVANCED MATERNAL AGE IN FIRST TRIMESTER: ICD-10-CM

## 2021-12-21 DIAGNOSIS — E03.9 HYPOTHYROIDISM AFFECTING PREGNANCY IN FIRST TRIMESTER: ICD-10-CM

## 2021-12-21 DIAGNOSIS — Z34.92 SECOND TRIMESTER PREGNANCY: ICD-10-CM

## 2021-12-21 PROCEDURE — 99207 PR OB VISIT-NO CHARGE - GICH ONLY: CPT | Performed by: STUDENT IN AN ORGANIZED HEALTH CARE EDUCATION/TRAINING PROGRAM

## 2021-12-21 NOTE — PROGRESS NOTES
Return OB Visit    S: Ms. Iniguez is feeling well today. She has no acute concerns. Denies leaking of fluid, vaginal bleeding, painful contractions. She had a level II US today.    O:   /82   Pulse 102   Wt 95.8 kg (211 lb 3.2 oz)   LMP 2021 (Exact Date)   BMI 36.25 kg/m      Gen: Well-appearing, NAD      Assessment:  Ms. Hodan Iniguez is a 41 year old yo  here for OB follow up. She is currently 18w1d.  Her pregnancy is complicated by advanced maternal age, history of maternal alcohol use early in pregnancy, maternal hypothyroidism.     Plan:  # Routine Prenatal Care  -- Dating: LMP=7 week US ELEN: 2022  -- PNLs:              O Positive, AB screen neg              RPR nr              Hep B S Ag neg              Hep C neg              Rubella immune              HIV neg              GC/Chlam neg     -- Genetic Screening: [place] low risk male, SMA negative, CF negative  -- Anatomy US: MFM scan today: unable to see some of the views, follow up ordered next month  -- Immunizations: Plans for influenza and Tdap at approximately 27 weeks gestation  -- 3rd TM labs including CBC, RPR: Planned for after 27 weeks gestation  -- 1 hr GTT: Planned for  24-28 weeks   -- GBS: Planned for 36 weeks  -- Postpartum Planning: To be discussed   -- Delivery Planning: AMA to be delivered by her due date is the goal  -- Return to clinic in 4 weeks for OB follow up visit  -- Planning to do at next visit: TSH, follow up MFM anatomy f/u     # Hypothyroidism  -- Levothyroxine 150 mcg/day  -- Check TSH q trimester     # Advanced Maternal Age  -- will begin  testing with weekly BPPs at 36 weeks  -- Plan for growth US at 36 weeks  -- Plan for IOL between 39w0d and 40w0d       Sasha Keller MD  OB/GYN  2021 9:16 AM

## 2021-12-21 NOTE — NURSING NOTE
Pt presents to clinic today for prenatal care 18w1d. Pt denies any contractions, bleeding, or leakage of fluid at this time. States baby is moving good.      Medication Reconciliation: complete  Trice Alfaro LPN

## 2021-12-21 NOTE — PROGRESS NOTES
Type of service:    Telemedicine Office Visit for fetal ultrasound    Date of service:     Date: 12/21/21     Time service began:    8:00 AM  Time service ended:    9:00 AM    Reason:      .tel: Lack of available service in patient area    Description of basis or telemedicine appropriateness:     Consultation provided at the request of Dr. Betancur for advice regarding the diagnosis and treatment of this patient's fetal ultrasound.  The patient's condition can be safely assessed via telemedicine.    The Mode of Transmission:    Secure interactive audio and visual telecommunication system (Video Guidance)    Location of originating and distant sites:      Originating site:   [place], [place]    Distant site:    [place], [place]    Afia Mckeon MD

## 2021-12-31 DIAGNOSIS — O99.281 HYPOTHYROIDISM AFFECTING PREGNANCY IN FIRST TRIMESTER: ICD-10-CM

## 2021-12-31 DIAGNOSIS — E03.9 HYPOTHYROIDISM AFFECTING PREGNANCY IN FIRST TRIMESTER: ICD-10-CM

## 2022-01-04 ENCOUNTER — MYC REFILL (OUTPATIENT)
Dept: OBGYN | Facility: OTHER | Age: 42
End: 2022-01-04
Payer: COMMERCIAL

## 2022-01-04 DIAGNOSIS — E03.9 HYPOTHYROIDISM AFFECTING PREGNANCY IN FIRST TRIMESTER: ICD-10-CM

## 2022-01-04 DIAGNOSIS — O99.281 HYPOTHYROIDISM AFFECTING PREGNANCY IN FIRST TRIMESTER: ICD-10-CM

## 2022-01-04 RX ORDER — LEVOTHYROXINE SODIUM 150 UG/1
150 TABLET ORAL DAILY
Qty: 30 TABLET | Refills: 0 | Status: CANCELLED | OUTPATIENT
Start: 2022-01-04

## 2022-01-04 RX ORDER — LEVOTHYROXINE SODIUM 75 UG/1
225 TABLET ORAL DAILY
Qty: 90 TABLET | Refills: 1 | Status: SHIPPED | OUTPATIENT
Start: 2022-01-04 | End: 2022-03-05

## 2022-01-04 NOTE — TELEPHONE ENCOUNTER
Per last lab, patient increased to 225 mcg daily. Order changed and refill granted.    Cristina Shay RN...................1/4/2022 9:16 AM

## 2022-01-12 ENCOUNTER — HOSPITAL ENCOUNTER (OUTPATIENT)
Dept: ULTRASOUND IMAGING | Facility: CLINIC | Age: 42
End: 2022-01-12
Attending: STUDENT IN AN ORGANIZED HEALTH CARE EDUCATION/TRAINING PROGRAM
Payer: COMMERCIAL

## 2022-01-12 ENCOUNTER — HOSPITAL ENCOUNTER (OUTPATIENT)
Dept: ULTRASOUND IMAGING | Facility: OTHER | Age: 42
Discharge: HOME | End: 2022-01-12
Attending: STUDENT IN AN ORGANIZED HEALTH CARE EDUCATION/TRAINING PROGRAM | Admitting: STUDENT IN AN ORGANIZED HEALTH CARE EDUCATION/TRAINING PROGRAM
Payer: COMMERCIAL

## 2022-01-12 ENCOUNTER — OFFICE VISIT (OUTPATIENT)
Dept: MATERNAL FETAL MEDICINE | Facility: CLINIC | Age: 42
End: 2022-01-12
Attending: STUDENT IN AN ORGANIZED HEALTH CARE EDUCATION/TRAINING PROGRAM
Payer: COMMERCIAL

## 2022-01-12 DIAGNOSIS — O09.512 AMA (ADVANCED MATERNAL AGE) PRIMIGRAVIDA 35+, SECOND TRIMESTER: Primary | ICD-10-CM

## 2022-01-12 DIAGNOSIS — O99.281 HYPOTHYROIDISM AFFECTING PREGNANCY IN FIRST TRIMESTER: ICD-10-CM

## 2022-01-12 DIAGNOSIS — E03.9 HYPOTHYROIDISM AFFECTING PREGNANCY IN FIRST TRIMESTER: ICD-10-CM

## 2022-01-12 DIAGNOSIS — Z34.92 SECOND TRIMESTER PREGNANCY: ICD-10-CM

## 2022-01-12 PROBLEM — F10.20 ALCOHOL DEPENDENCE (H): Status: ACTIVE | Noted: 2022-01-12

## 2022-01-12 NOTE — PROGRESS NOTES
Type of service:    Telemedicine Office Visit for fetal ultrasound    Date of service:     Date: 01/12/22     Time service began:    2:00 PM  Time service ended:    3:00 PM    Reason:      .tel: Patient unable to travel    Description of basis or telemedicine appropriateness:     Consultation provided at the request of Dr. Mireya Betancur for advice regarding the diagnosis and treatment of this patient's pregnancy.  The patient's condition can be safely assessed via telemedicine.    The Mode of Transmission:    Secure interactive audio and visual telecommunication system (Video Guidance)    Location of originating and distant sites:      Originating site:   [place], [place]    Distant site:    [place], [place]    Holly Elizondo DO

## 2022-01-18 ENCOUNTER — PRENATAL OFFICE VISIT (OUTPATIENT)
Dept: OBGYN | Facility: OTHER | Age: 42
End: 2022-01-18
Attending: STUDENT IN AN ORGANIZED HEALTH CARE EDUCATION/TRAINING PROGRAM
Payer: COMMERCIAL

## 2022-01-18 VITALS
HEART RATE: 94 BPM | BODY MASS INDEX: 36.61 KG/M2 | WEIGHT: 213.3 LBS | DIASTOLIC BLOOD PRESSURE: 68 MMHG | SYSTOLIC BLOOD PRESSURE: 112 MMHG

## 2022-01-18 DIAGNOSIS — F33.1 MODERATE EPISODE OF RECURRENT MAJOR DEPRESSIVE DISORDER (H): ICD-10-CM

## 2022-01-18 DIAGNOSIS — O09.522 MULTIGRAVIDA OF ADVANCED MATERNAL AGE IN SECOND TRIMESTER: ICD-10-CM

## 2022-01-18 DIAGNOSIS — E03.9 HYPOTHYROIDISM, UNSPECIFIED TYPE: Primary | ICD-10-CM

## 2022-01-18 DIAGNOSIS — Z34.92 SECOND TRIMESTER PREGNANCY: ICD-10-CM

## 2022-01-18 LAB — TSH SERPL DL<=0.005 MIU/L-ACNC: 0.81 MU/L (ref 0.4–4)

## 2022-01-18 PROCEDURE — 99207 PR OB VISIT-NO CHARGE - GICH ONLY: CPT | Performed by: STUDENT IN AN ORGANIZED HEALTH CARE EDUCATION/TRAINING PROGRAM

## 2022-01-18 PROCEDURE — 36415 COLL VENOUS BLD VENIPUNCTURE: CPT | Mod: ZL | Performed by: STUDENT IN AN ORGANIZED HEALTH CARE EDUCATION/TRAINING PROGRAM

## 2022-01-18 PROCEDURE — 84443 ASSAY THYROID STIM HORMONE: CPT | Mod: ZL | Performed by: STUDENT IN AN ORGANIZED HEALTH CARE EDUCATION/TRAINING PROGRAM

## 2022-01-18 NOTE — NURSING NOTE
Pt presents to clinic today for prenatal care 22w1d. Pt denies any contractions, bleeding, or leakage of fluid at this time. States baby is moving good.      Medication Reconciliation: complete  Trice Alfaro LPN

## 2022-01-18 NOTE — PROGRESS NOTES
Return OB Visit    S: Ms. Iniguez is feeling well today. She has no acute concerns. Denies leaking of fluid, vaginal bleeding, painful contractions. Notes fetal movements.    O: /68   Pulse 94   Wt 96.8 kg (213 lb 4.8 oz)   LMP 2021 (Exact Date)   BMI 36.61 kg/m    Gen: Well-appearing, NAD    FH 24 cm   bpm    Assessment:  Ms. Hodan Iniguez is a 41 year old yo  here for OB follow up. She is currently 22w1d.  Her pregnancy is complicated by advanced maternal age, history of maternal alcohol use early in pregnancy, maternal hypothyroidism.     Plan:  # Routine Prenatal Care  -- Dating: LMP=7 week US ELEN: 2022  -- PNLs:              O Positive, AB screen neg              RPR nr              Hep B S Ag neg              Hep C neg              Rubella immune              HIV neg              GC/Chlam neg     -- Genetic Screening: [place] low risk male, SMA negative, CF negative  -- Anatomy US: MFM scan today: unable to see some of the views, follow up ordered next month  -- Immunizations: Plans for influenza and Tdap at approximately 27 weeks gestation  -- 3rd TM labs including CBC, RPR: Planned for after 27 weeks gestation  -- 1 hr GTT: Planned for  24-28 weeks   -- GBS: Planned for 36 weeks  -- Postpartum Planning: To be discussed   -- Delivery Planning: AMA to be delivered by her due date is the goal  -- Return to clinic in 4 weeks for OB follow up visit  -- Planning to do at next visit: Glucose    # Hypothyroidism  -- Levothyroxine 150 mcg/day  -- Check TSH q trimester   Today pending     # Advanced Maternal Age  -- will begin  testing with weekly BPPs at 36 weeks  -- Plan for growth US at 36 weeks  -- Plan for IOL between 39w0d and 40w0d    Sasha Keller MD  OB/GYN  2022 2:42 PM

## 2022-01-29 ENCOUNTER — HEALTH MAINTENANCE LETTER (OUTPATIENT)
Age: 42
End: 2022-01-29

## 2022-02-15 ENCOUNTER — PRENATAL OFFICE VISIT (OUTPATIENT)
Dept: OBGYN | Facility: OTHER | Age: 42
End: 2022-02-15
Attending: STUDENT IN AN ORGANIZED HEALTH CARE EDUCATION/TRAINING PROGRAM
Payer: COMMERCIAL

## 2022-02-15 VITALS
WEIGHT: 215 LBS | HEART RATE: 96 BPM | SYSTOLIC BLOOD PRESSURE: 122 MMHG | BODY MASS INDEX: 36.9 KG/M2 | DIASTOLIC BLOOD PRESSURE: 80 MMHG

## 2022-02-15 DIAGNOSIS — O09.522 MULTIGRAVIDA OF ADVANCED MATERNAL AGE IN SECOND TRIMESTER: ICD-10-CM

## 2022-02-15 DIAGNOSIS — O99.012 ANEMIA AFFECTING PREGNANCY IN SECOND TRIMESTER: Primary | ICD-10-CM

## 2022-02-15 DIAGNOSIS — Z34.92 SECOND TRIMESTER PREGNANCY: Primary | ICD-10-CM

## 2022-02-15 DIAGNOSIS — E03.9 HYPOTHYROIDISM, UNSPECIFIED TYPE: ICD-10-CM

## 2022-02-15 LAB
ERYTHROCYTE [DISTWIDTH] IN BLOOD BY AUTOMATED COUNT: 13 % (ref 10–15)
GLUCOSE 1H P 50 G GLC PO SERPL-MCNC: 113 MG/DL (ref 70–129)
HCT VFR BLD AUTO: 34 % (ref 35–47)
HGB BLD-MCNC: 10.9 G/DL (ref 11.7–15.7)
MCH RBC QN AUTO: 30.8 PG (ref 26.5–33)
MCHC RBC AUTO-ENTMCNC: 32.1 G/DL (ref 31.5–36.5)
MCV RBC AUTO: 96 FL (ref 78–100)
PLATELET # BLD AUTO: 311 10E3/UL (ref 150–450)
RBC # BLD AUTO: 3.54 10E6/UL (ref 3.8–5.2)
WBC # BLD AUTO: 12.2 10E3/UL (ref 4–11)

## 2022-02-15 PROCEDURE — 36415 COLL VENOUS BLD VENIPUNCTURE: CPT | Mod: ZL | Performed by: STUDENT IN AN ORGANIZED HEALTH CARE EDUCATION/TRAINING PROGRAM

## 2022-02-15 PROCEDURE — 82950 GLUCOSE TEST: CPT | Mod: ZL | Performed by: STUDENT IN AN ORGANIZED HEALTH CARE EDUCATION/TRAINING PROGRAM

## 2022-02-15 PROCEDURE — 86780 TREPONEMA PALLIDUM: CPT | Mod: ZL | Performed by: STUDENT IN AN ORGANIZED HEALTH CARE EDUCATION/TRAINING PROGRAM

## 2022-02-15 PROCEDURE — 99207 PR OB VISIT-NO CHARGE - GICH ONLY: CPT | Performed by: STUDENT IN AN ORGANIZED HEALTH CARE EDUCATION/TRAINING PROGRAM

## 2022-02-15 PROCEDURE — 85027 COMPLETE CBC AUTOMATED: CPT | Mod: ZL | Performed by: STUDENT IN AN ORGANIZED HEALTH CARE EDUCATION/TRAINING PROGRAM

## 2022-02-15 RX ORDER — FERROUS SULFATE 325(65) MG
325 TABLET ORAL
Qty: 60 TABLET | Refills: 1 | Status: SHIPPED | OUTPATIENT
Start: 2022-02-15 | End: 2022-06-30

## 2022-02-15 NOTE — NURSING NOTE
Pt presents to clinic today for prenatal care 26w1d. Pt denies any contractions, bleeding, or leakage of fluid at this time. States baby is moving good.      Medication Reconciliation: complete  Trice Alfaro LPN

## 2022-02-15 NOTE — PROGRESS NOTES
Return OB Visit    S: Ms. Iniguez is feeling well today. She has no acute concerns. Denies leaking of fluid, vaginal bleeding, painful contractions. Notes fetal movements.    O:   /80   Pulse 96   Wt 97.5 kg (215 lb)   LMP 2021 (Exact Date)   BMI 36.90 kg/m      Gen: Well-appearing, NAD    FH 28 cm   bpm    Assessment:  Ms. Hodan Iniguez is a 41 year old yo  here for OB follow up. She is currently 26w1d.  Her pregnancy is complicated by advanced maternal age, history of maternal alcohol use early in pregnancy, maternal hypothyroidism.     Plan:  # Routine Prenatal Care  -- Dating: LMP=7 week US ELEN: 2022  -- PNLs:              O Positive, AB screen neg              RPR nr              Hep B S Ag neg              Hep C neg              Rubella immune              HIV neg              GC/Chlam neg     -- Genetic Screening: [place] low risk male, SMA negative, CF negative  -- Anatomy US: MFM scan today: unable to see some of the views, follow up ordered next month  -- Immunizations: Declines flu shot, and Tdap at approximately 27 weeks gestation  -- 3rd TM labs including CBC, RPR: today  -- 1 hr GTT: today  -- GBS: Planned for 36 weeks  -- Postpartum Planning: To be discussed   -- Delivery Planning: AMA to be delivered by her due date is the goal  -- Return to clinic in 4 weeks for OB follow up visit  -- Planning to do at next visit: Tdap, schedule BPPS for after 36 weeks     # Hypothyroidism  -- Levothyroxine 150 mcg/day  -- Check TSH q trimester               TSH improved from 32 in 2021--> 0.81 on      # Advanced Maternal Age  -- 32 week growth US (ordered)  -- 36 week growth US (not yet ordered)  -- will begin  testing with weekly BPPs at 36 weeks  -- Plan for growth US at 36 weeks  -- Plan for IOL between 39w0d and 40w0d    Sasha Keller MD  OB/GYN  2/15/2022 11:40 AM

## 2022-02-16 LAB — T PALLIDUM AB SER QL: NONREACTIVE

## 2022-03-04 ENCOUNTER — MYC MEDICAL ADVICE (OUTPATIENT)
Dept: OBGYN | Facility: OTHER | Age: 42
End: 2022-03-04
Payer: COMMERCIAL

## 2022-03-04 DIAGNOSIS — O99.281 HYPOTHYROIDISM AFFECTING PREGNANCY IN FIRST TRIMESTER: ICD-10-CM

## 2022-03-04 DIAGNOSIS — E03.9 HYPOTHYROIDISM AFFECTING PREGNANCY IN FIRST TRIMESTER: ICD-10-CM

## 2022-03-05 RX ORDER — LEVOTHYROXINE SODIUM 75 UG/1
225 TABLET ORAL DAILY
Qty: 90 TABLET | Refills: 1 | Status: SHIPPED | OUTPATIENT
Start: 2022-03-05 | End: 2022-05-16

## 2022-03-15 ENCOUNTER — PRENATAL OFFICE VISIT (OUTPATIENT)
Dept: OBGYN | Facility: OTHER | Age: 42
End: 2022-03-15
Attending: STUDENT IN AN ORGANIZED HEALTH CARE EDUCATION/TRAINING PROGRAM
Payer: COMMERCIAL

## 2022-03-15 VITALS
HEART RATE: 100 BPM | SYSTOLIC BLOOD PRESSURE: 122 MMHG | BODY MASS INDEX: 37.33 KG/M2 | WEIGHT: 217.5 LBS | DIASTOLIC BLOOD PRESSURE: 80 MMHG

## 2022-03-15 DIAGNOSIS — E03.9 HYPOTHYROIDISM AFFECTING PREGNANCY IN THIRD TRIMESTER: Primary | ICD-10-CM

## 2022-03-15 DIAGNOSIS — O99.283 HYPOTHYROIDISM AFFECTING PREGNANCY IN THIRD TRIMESTER: Primary | ICD-10-CM

## 2022-03-15 DIAGNOSIS — O09.523 MULTIGRAVIDA OF ADVANCED MATERNAL AGE IN THIRD TRIMESTER: ICD-10-CM

## 2022-03-15 PROCEDURE — 90715 TDAP VACCINE 7 YRS/> IM: CPT

## 2022-03-15 PROCEDURE — 90471 IMMUNIZATION ADMIN: CPT

## 2022-03-15 PROCEDURE — 99207 PR OB VISIT-NO CHARGE - GICH ONLY: CPT | Performed by: STUDENT IN AN ORGANIZED HEALTH CARE EDUCATION/TRAINING PROGRAM

## 2022-03-15 ASSESSMENT — PAIN SCALES - GENERAL: PAINLEVEL: NO PAIN (0)

## 2022-03-15 NOTE — PROGRESS NOTES
Return OB Visit    S: Ms. Iniguez is feeling well today. She has no acute concerns. Denies leaking of fluid, vaginal bleeding, painful contractions. Notes fetal movements.    O:   /80   Pulse 100   Wt 98.7 kg (217 lb 8 oz)   LMP 2021 (Exact Date)   BMI 37.33 kg/m      Gen: Well-appearing, NAD    FH 34 cm   bpm    Assessment:  Ms. Hodan Iniguez is a 41 year old yo  here for OB follow up. She is currently 30w1d.  Her pregnancy is complicated by advanced maternal age, history of maternal alcohol use early in pregnancy, maternal hypothyroidism.     Plan:  # Routine Prenatal Care  -- Dating: LMP=7 week US ELEN: 2022  -- PNLs:              O Positive, AB screen neg              RPR nr              Hep B S Ag neg              Hep C neg              Rubella immune              HIV neg              GC/Chlam neg     -- Genetic Screening: [place] low risk male, SMA negative, CF negative  -- Anatomy US: [facility] normal anatomy  -- Immunizations: Declines flu shot, Tdap 3/15  -- 3rd TM labs including CBC, RPR: RPR nr, Hgb 10.9  -- 1 hr GTT: 113  -- GBS: Planned for 36 weeks  -- Postpartum Planning: To be discussed   -- Delivery Planning: AMA to be delivered by her due date is the goal  -- Return to clinic in 2 weeks for OB follow up visit  -- Planning to do at next visit: ensure US schedule     # Hypothyroidism  -- Levothyroxine 150 mcg/day  -- Check TSH q trimester               TSH improved from 32 in 2021--> 0.81 on      # Advanced Maternal Age  -- 32 week growth US (ordered)  -- 36 week growth US (ordered)  -- will begin  testing with weekly BPPs at 34 weeks    Sasha Keller MD  OB/GYN  3/15/2022 2:05 PM

## 2022-03-15 NOTE — NURSING NOTE
"Pt presents to clinic today for prenatal care 30w1d. Pt denies any contractions, bleeding, or leakage of fluid at this time. States baby is moving good but states she has noticed tailbone \"vibration\".      Medication Reconciliation: complete  Trice Alfaro, NERY    "

## 2022-03-24 DIAGNOSIS — F33.1 MODERATE EPISODE OF RECURRENT MAJOR DEPRESSIVE DISORDER (H): ICD-10-CM

## 2022-03-24 RX ORDER — SERTRALINE HYDROCHLORIDE 100 MG/1
150 TABLET, FILM COATED ORAL DAILY
Qty: 135 TABLET | Refills: 3 | OUTPATIENT
Start: 2022-03-24

## 2022-03-24 NOTE — TELEPHONE ENCOUNTER
[facility] [place], [place] - [address] sent Rx request for the following:      sertraline (ZOLOFT) 100 MG tablet 135 tablet 3 9/17/2021  No   Sig - Route: Take 1.5 tablets (150 mg) by mouth daily - Oral   Sent to pharmacy as: Sertraline HCl 100 MG Oral Tablet (ZOLOFT)   Class: E-Prescribe   Order: [id number]     [id number] - [place], [place] - [address]     Refused, with note to pharmacy, to transfer Rx. Zina Kim RN .............. 3/24/2022  12:03 PM

## 2022-03-30 ENCOUNTER — PRENATAL OFFICE VISIT (OUTPATIENT)
Dept: OBGYN | Facility: OTHER | Age: 42
End: 2022-03-30
Attending: STUDENT IN AN ORGANIZED HEALTH CARE EDUCATION/TRAINING PROGRAM
Payer: COMMERCIAL

## 2022-03-30 ENCOUNTER — HOSPITAL ENCOUNTER (OUTPATIENT)
Dept: ULTRASOUND IMAGING | Facility: OTHER | Age: 42
Discharge: HOME | End: 2022-03-30
Attending: STUDENT IN AN ORGANIZED HEALTH CARE EDUCATION/TRAINING PROGRAM
Payer: COMMERCIAL

## 2022-03-30 VITALS
HEART RATE: 100 BPM | WEIGHT: 215.8 LBS | BODY MASS INDEX: 37.04 KG/M2 | DIASTOLIC BLOOD PRESSURE: 80 MMHG | SYSTOLIC BLOOD PRESSURE: 124 MMHG

## 2022-03-30 DIAGNOSIS — O40.3XX0 POLYHYDRAMNIOS IN THIRD TRIMESTER COMPLICATION, SINGLE OR UNSPECIFIED FETUS: ICD-10-CM

## 2022-03-30 DIAGNOSIS — O99.283 HYPOTHYROIDISM AFFECTING PREGNANCY IN THIRD TRIMESTER: Primary | ICD-10-CM

## 2022-03-30 DIAGNOSIS — O09.523 MULTIGRAVIDA OF ADVANCED MATERNAL AGE IN THIRD TRIMESTER: ICD-10-CM

## 2022-03-30 DIAGNOSIS — E03.9 HYPOTHYROIDISM, UNSPECIFIED TYPE: ICD-10-CM

## 2022-03-30 DIAGNOSIS — Z34.92 SECOND TRIMESTER PREGNANCY: ICD-10-CM

## 2022-03-30 DIAGNOSIS — E03.9 HYPOTHYROIDISM AFFECTING PREGNANCY IN THIRD TRIMESTER: Primary | ICD-10-CM

## 2022-03-30 PROCEDURE — 76816 OB US FOLLOW-UP PER FETUS: CPT

## 2022-03-30 PROCEDURE — 99207 PR OB VISIT-NO CHARGE - GICH ONLY: CPT | Performed by: STUDENT IN AN ORGANIZED HEALTH CARE EDUCATION/TRAINING PROGRAM

## 2022-03-30 NOTE — NURSING NOTE
Pt presents to clinic today for prenatal care 32w2d. Pt denies any  bleeding, or leakage of fluid at this time. States baby is moving good and has noticed some contractions.    Medication Reconciliation: complete  Trice Alfaro LPN

## 2022-03-30 NOTE — PROGRESS NOTES
Return OB Visit    S: Ms. Iniguez is feeling well today. She has no acute concerns. Denies leaking of fluid, vaginal bleeding, painful contractions. Notes fetal movements.    O: /80   Pulse 100   Wt 97.9 kg (215 lb 12.8 oz)   LMP 2021 (Exact Date)   BMI 37.04 kg/m    Gen: Well-appearing, NAD    Growth US: 142 bpm    Assessment:  Ms. Hodan Iniguez is a 41 year old yo  here for OB follow up. She is currently 32w2d.  Her pregnancy is complicated by advanced maternal age, history of maternal alcohol use early in pregnancy, maternal hypothyroidism.     Plan:  # Routine Prenatal Care  -- Dating: LMP=7 week US ELEN: 2022  -- PNLs:              O Positive, AB screen neg              RPR nr              Hep B S Ag neg              Hep C neg              Rubella immune              HIV neg              GC/Chlam neg     -- Genetic Screening: [place] low risk male, SMA negative, CF negative  -- Anatomy US: MFM normal anatomy  -- Immunizations: Declines flu shot, Tdap 3/15  -- 3rd TM labs including CBC, RPR: RPR nr, Hgb 10.9  -- 1 hr GTT: 113  -- GBS: Planned for 36 weeks  -- Postpartum Planning: To be discussed   -- Delivery Planning: AMA to be delivered by her due date is the goal  -- Return to clinic in 2 weeks for OB follow up visit  -- Planning to do at next visit: follow up BPPs    # Polyhydramnios  -- JOLEEN 26.5 cm on 3/30  -- Recheck next week with BPP  -- If remains mild, anticipate IOL between [id number]-- similar to AMA timeline     # Hypothyroidism  -- Levothyroxine 150 mcg/day  -- Check TSH q trimester               TSH improved from 32 in 2021--> 0.81 on      # Advanced Maternal Age  -- 32 week growth US   68%ile, BPD 98% and HC 98%ile, AC 75%ile  -- 36 week growth US (ordered)  -- will begin  testing with weekly BPPs at 34 weeks    Sasha Keller MD  OB/GYN  3/30/2022 3:04 PM

## 2022-04-08 DIAGNOSIS — F33.1 MODERATE EPISODE OF RECURRENT MAJOR DEPRESSIVE DISORDER (H): ICD-10-CM

## 2022-04-08 NOTE — TELEPHONE ENCOUNTER
We received a refill prescription request for her sertraline.  Please verify which [facility] she would like the prescription sent to.  Toña Beckham PA-C.......... 4/8/2022 11:19 AM

## 2022-04-11 ENCOUNTER — MYC MEDICAL ADVICE (OUTPATIENT)
Dept: OBGYN | Facility: OTHER | Age: 42
End: 2022-04-11
Payer: COMMERCIAL

## 2022-04-12 ENCOUNTER — PRENATAL OFFICE VISIT (OUTPATIENT)
Dept: OBGYN | Facility: OTHER | Age: 42
End: 2022-04-12
Attending: STUDENT IN AN ORGANIZED HEALTH CARE EDUCATION/TRAINING PROGRAM
Payer: COMMERCIAL

## 2022-04-12 ENCOUNTER — HOSPITAL ENCOUNTER (OUTPATIENT)
Dept: ULTRASOUND IMAGING | Facility: OTHER | Age: 42
Discharge: HOME | End: 2022-04-12
Attending: STUDENT IN AN ORGANIZED HEALTH CARE EDUCATION/TRAINING PROGRAM
Payer: COMMERCIAL

## 2022-04-12 VITALS
WEIGHT: 219.7 LBS | DIASTOLIC BLOOD PRESSURE: 78 MMHG | SYSTOLIC BLOOD PRESSURE: 122 MMHG | HEART RATE: 102 BPM | BODY MASS INDEX: 37.71 KG/M2

## 2022-04-12 DIAGNOSIS — O40.3XX0 POLYHYDRAMNIOS IN THIRD TRIMESTER COMPLICATION, SINGLE OR UNSPECIFIED FETUS: ICD-10-CM

## 2022-04-12 DIAGNOSIS — E03.9 HYPOTHYROIDISM AFFECTING PREGNANCY IN THIRD TRIMESTER: ICD-10-CM

## 2022-04-12 DIAGNOSIS — O99.283 HYPOTHYROIDISM AFFECTING PREGNANCY IN THIRD TRIMESTER: ICD-10-CM

## 2022-04-12 DIAGNOSIS — O99.283 HYPOTHYROIDISM AFFECTING PREGNANCY IN THIRD TRIMESTER: Primary | ICD-10-CM

## 2022-04-12 DIAGNOSIS — O99.012 ANEMIA AFFECTING PREGNANCY IN SECOND TRIMESTER: ICD-10-CM

## 2022-04-12 DIAGNOSIS — E03.9 HYPOTHYROIDISM AFFECTING PREGNANCY IN THIRD TRIMESTER: Primary | ICD-10-CM

## 2022-04-12 DIAGNOSIS — O09.523 MULTIGRAVIDA OF ADVANCED MATERNAL AGE IN THIRD TRIMESTER: ICD-10-CM

## 2022-04-12 PROCEDURE — 76819 FETAL BIOPHYS PROFIL W/O NST: CPT

## 2022-04-12 PROCEDURE — 99207 PR OB VISIT-NO CHARGE - GICH ONLY: CPT | Performed by: STUDENT IN AN ORGANIZED HEALTH CARE EDUCATION/TRAINING PROGRAM

## 2022-04-12 NOTE — TELEPHONE ENCOUNTER
Called and left message for patient to return call to verify pharmacy.  Nuvia Arrieta RN on 4/12/2022 at 4:03 PM

## 2022-04-12 NOTE — PROGRESS NOTES
Return OB Visit    S: Ms. Iniguez is feeling well today. She has a mild cold with congestion and cough. Discussed safe over the counter remedies. Denies leaking of fluid, vaginal bleeding, painful contractions. Notes fetal movements  Discussed that her US shows worsening polyhdramnios now, meeting high end of moderate criteria.     O:     /78   Pulse 102   Wt 99.7 kg (219 lb 11.2 oz)   LMP 2021 (Exact Date)   BMI 37.71 kg/m    Gen: Well-appearing, NAD    BPP today  but noted worsening polyhydramnios, now moderate at 33.5 cm    Assessment:  Ms. Hodan Iniguez is a 41 year old yo  here for OB follow up. She is currently 34w1d.  Her pregnancy is complicated by advanced maternal age, history of maternal alcohol use early in pregnancy, maternal hypothyroidism, polyhydramnios which is worsening (now moderate).     Plan:  # Routine Prenatal Care  -- Dating: LMP=7 week US ELEN: 2022  -- PNLs:              O Positive, AB screen neg              RPR nr              Hep B S Ag neg              Hep C neg              Rubella immune              HIV neg              GC/Chlam neg     -- Genetic Screening: [place] low risk male, SMA negative, CF negative  -- Anatomy US: MFM normal anatomy  -- Immunizations: Declines flu shot, Tdap 3/15  -- 3rd TM labs including CBC, RPR: RPR nr, Hgb 10.9  -- 1 hr GTT: 113  -- GBS: Planned for 36 weeks  -- Postpartum Planning: To be discussed   -- Delivery Planning: AMA to be delivered by her due date is the goal  -- Return to clinic in 1 weeks for OB follow up visit  -- Planning to do at next visit: follow up BPPs     # Polyhydramnios  -- JOLEEN 26.5 cm on 3/30--> worsened to 33.5 cm today   Discussed with worsening polyhydramnios, and being far from delivery, recommend MFM follow up US. We do not have any availability at our facility for over 1 month, so will recommend traveling to the [facility] for sooner appt.  --  Originally with mild poly (26 cm) anticipated IOL between  [id number]-45s5k-- similar to AMA timeline-- but this may need ot be sooner based on results from [facility] recommendation     # Hypothyroidism  -- Levothyroxine 150 mcg/day  -- Check TSH q trimester               TSH improved from 32 in 2021--> 0.81 on    Recheck today     # Advanced Maternal Age  -- 32 week growth US               68%ile, BPD 98% and HC 98%ile, AC 75%ile  -- 36 week growth US (ordered)  -- will begin  testing with weekly BPPs at 34 weeks    Sasha Keller MD  OB/GYN  2022 3:24 PM

## 2022-04-12 NOTE — NURSING NOTE
Pt presents to clinic today for prenatal care 34w1d. Pt denies any bleeding, or leakage of fluid at this time. States baby is moving good and has noticed some contractions.    Medication Reconciliation: complete  Trice Alfaro LPN

## 2022-04-13 DIAGNOSIS — O09.512 AMA (ADVANCED MATERNAL AGE) PRIMIGRAVIDA 35+, SECOND TRIMESTER: ICD-10-CM

## 2022-04-13 DIAGNOSIS — E03.9 HYPOTHYROIDISM, UNSPECIFIED TYPE: Primary | ICD-10-CM

## 2022-04-19 ENCOUNTER — HOSPITAL ENCOUNTER (OUTPATIENT)
Dept: ULTRASOUND IMAGING | Facility: OTHER | Age: 42
Discharge: HOME | End: 2022-04-19
Attending: STUDENT IN AN ORGANIZED HEALTH CARE EDUCATION/TRAINING PROGRAM
Payer: COMMERCIAL

## 2022-04-19 ENCOUNTER — LAB (OUTPATIENT)
Dept: LAB | Facility: OTHER | Age: 42
End: 2022-04-19
Attending: STUDENT IN AN ORGANIZED HEALTH CARE EDUCATION/TRAINING PROGRAM
Payer: COMMERCIAL

## 2022-04-19 DIAGNOSIS — O09.523 MULTIGRAVIDA OF ADVANCED MATERNAL AGE IN THIRD TRIMESTER: ICD-10-CM

## 2022-04-19 DIAGNOSIS — E03.9 HYPOTHYROIDISM AFFECTING PREGNANCY IN THIRD TRIMESTER: ICD-10-CM

## 2022-04-19 DIAGNOSIS — O99.283 HYPOTHYROIDISM AFFECTING PREGNANCY IN THIRD TRIMESTER: ICD-10-CM

## 2022-04-19 LAB — TSH SERPL DL<=0.005 MIU/L-ACNC: 0.04 MU/L (ref 0.4–4)

## 2022-04-19 PROCEDURE — 76819 FETAL BIOPHYS PROFIL W/O NST: CPT

## 2022-04-19 PROCEDURE — 36415 COLL VENOUS BLD VENIPUNCTURE: CPT | Mod: ZL

## 2022-04-19 PROCEDURE — 84443 ASSAY THYROID STIM HORMONE: CPT | Mod: ZL

## 2022-04-21 ENCOUNTER — PRENATAL OFFICE VISIT (OUTPATIENT)
Dept: OBGYN | Facility: OTHER | Age: 42
End: 2022-04-21
Attending: STUDENT IN AN ORGANIZED HEALTH CARE EDUCATION/TRAINING PROGRAM
Payer: COMMERCIAL

## 2022-04-21 VITALS
DIASTOLIC BLOOD PRESSURE: 80 MMHG | WEIGHT: 219.5 LBS | SYSTOLIC BLOOD PRESSURE: 124 MMHG | BODY MASS INDEX: 37.68 KG/M2 | HEART RATE: 92 BPM

## 2022-04-21 DIAGNOSIS — O40.3XX0 POLYHYDRAMNIOS IN THIRD TRIMESTER COMPLICATION, SINGLE OR UNSPECIFIED FETUS: ICD-10-CM

## 2022-04-21 DIAGNOSIS — O99.283 HYPOTHYROIDISM AFFECTING PREGNANCY IN THIRD TRIMESTER: Primary | ICD-10-CM

## 2022-04-21 DIAGNOSIS — O99.012 ANEMIA AFFECTING PREGNANCY IN SECOND TRIMESTER: ICD-10-CM

## 2022-04-21 DIAGNOSIS — E03.9 HYPOTHYROIDISM AFFECTING PREGNANCY IN THIRD TRIMESTER: Primary | ICD-10-CM

## 2022-04-21 PROCEDURE — 99207 PR OB VISIT-NO CHARGE - GICH ONLY: CPT | Performed by: STUDENT IN AN ORGANIZED HEALTH CARE EDUCATION/TRAINING PROGRAM

## 2022-04-21 NOTE — PROGRESS NOTES
Return OB Visit    S: Ms. Iniguez is feeling well today. She has no acute concerns. Denies leaking of fluid, vaginal bleeding, painful contractions. Notes fetal movements.    O: /80   Pulse 92   Wt 99.6 kg (219 lb 8 oz)   LMP 2021 (Exact Date)   BMI 37.68 kg/m    Gen: Well-appearing, NAD    FH 38 cm   bpm    Assessment:  Ms. Hodan Iniguez is a 41 year old yo  here for OB follow up. She is currently 35w3d.  Her pregnancy is complicated by advanced maternal age, history of maternal alcohol use early in pregnancy, maternal hypothyroidism, polyhydramnios which is worsening (now moderate).     Plan:  # Routine Prenatal Care  -- Dating: LMP=7 week US ELEN: 2022  -- PNLs:              O Positive, AB screen neg              RPR nr              Hep B S Ag neg              Hep C neg              Rubella immune              HIV neg              GC/Chlam neg     -- Genetic Screening: [place] low risk male, SMA negative, CF negative  -- Anatomy US: [facility] normal anatomy  -- Immunizations: Declines flu shot, Tdap 3/15  -- 3rd TM labs including CBC, RPR: RPR nr, Hgb 10.9  -- 1 hr GTT: 113  -- GBS: Planned for 36 weeks  -- Postpartum Planning: To be discussed   -- Delivery Planning: AMA to be delivered by her due date is the goal  -- Return to clinic in 1 weeks for OB follow up visit  -- Planning to do at next visit: follow up BPPs     # Polyhydramnios  -- JOLEEN 26.5 cm on 3/30--> 33.5--> 30.1cm               Discussed with worsening polyhydramnios, and being far from delivery, recommend [facility] follow up US. We do not have any availability at our facility for over 1 month, so will recommend traveling to the [facility] for sooner appt- she declined to do this  --  Originally with mild poly (26 cm) anticipated IOL between [id number]-25s2c-- similar to AMA timeline-- but this may need to be sooner based on results from MFM recommendation     # Hypothyroidism  -- Levothyroxine now 150 mcg/day  -- Check TSH q  trimester               TSH improved from 32 in 2021--> 0.81 on : 0.04, dropped levothyroxine dose to 150 mcg down from 225mcg     # Advanced Maternal Age  -- 32 week growth US               68%ile, BPD 98% and HC 98%ile, AC 75%ile  -- 36 week growth US (ordered)  --  testing with weekly BPPs at 34 weeks    Sasha Keller MD  OB/GYN  2022 8:55 AM

## 2022-04-21 NOTE — NURSING NOTE
Pt presents to clinic today for prenatal care 35w3d. Pt denies any bleeding, or leakage of fluid at this time. States baby is moving good, is noticing contractions and pelvic pressure.      Medication Reconciliation: complete  Trice Alfaro LPN

## 2022-04-26 ENCOUNTER — PRENATAL OFFICE VISIT (OUTPATIENT)
Dept: OBGYN | Facility: OTHER | Age: 42
End: 2022-04-26
Attending: STUDENT IN AN ORGANIZED HEALTH CARE EDUCATION/TRAINING PROGRAM
Payer: COMMERCIAL

## 2022-04-26 ENCOUNTER — HOSPITAL ENCOUNTER (OUTPATIENT)
Dept: ULTRASOUND IMAGING | Facility: OTHER | Age: 42
Discharge: HOME | End: 2022-04-26
Attending: STUDENT IN AN ORGANIZED HEALTH CARE EDUCATION/TRAINING PROGRAM
Payer: COMMERCIAL

## 2022-04-26 VITALS
SYSTOLIC BLOOD PRESSURE: 126 MMHG | DIASTOLIC BLOOD PRESSURE: 78 MMHG | HEART RATE: 104 BPM | WEIGHT: 221.3 LBS | BODY MASS INDEX: 37.99 KG/M2

## 2022-04-26 DIAGNOSIS — O40.3XX0 POLYHYDRAMNIOS IN THIRD TRIMESTER COMPLICATION, SINGLE OR UNSPECIFIED FETUS: ICD-10-CM

## 2022-04-26 DIAGNOSIS — E03.9 HYPOTHYROIDISM AFFECTING PREGNANCY IN THIRD TRIMESTER: ICD-10-CM

## 2022-04-26 DIAGNOSIS — O99.283 HYPOTHYROIDISM AFFECTING PREGNANCY IN THIRD TRIMESTER: ICD-10-CM

## 2022-04-26 DIAGNOSIS — O99.283 HYPOTHYROIDISM AFFECTING PREGNANCY IN THIRD TRIMESTER: Primary | ICD-10-CM

## 2022-04-26 DIAGNOSIS — O09.523 MULTIGRAVIDA OF ADVANCED MATERNAL AGE IN THIRD TRIMESTER: ICD-10-CM

## 2022-04-26 DIAGNOSIS — E03.9 HYPOTHYROIDISM AFFECTING PREGNANCY IN THIRD TRIMESTER: Primary | ICD-10-CM

## 2022-04-26 PROCEDURE — 87653 STREP B DNA AMP PROBE: CPT | Mod: ZL | Performed by: STUDENT IN AN ORGANIZED HEALTH CARE EDUCATION/TRAINING PROGRAM

## 2022-04-26 PROCEDURE — 99207 PR OB VISIT-NO CHARGE - GICH ONLY: CPT | Performed by: STUDENT IN AN ORGANIZED HEALTH CARE EDUCATION/TRAINING PROGRAM

## 2022-04-26 PROCEDURE — 76819 FETAL BIOPHYS PROFIL W/O NST: CPT

## 2022-04-26 NOTE — NURSING NOTE
Pt presents to clinic today for prenatal care 36w1d. Pt denies any , bleeding, or leakage of fluid at this time. States baby is moving good and has noticed some contractions.    Medication Reconciliation: complete  Trice Alfaro LPN

## 2022-04-26 NOTE — PROGRESS NOTES
Return OB Visit    S: Ms. Iniguez is feeling well today. She has no acute concerns. Denies leaking of fluid, vaginal bleeding, painful contractions. Notes fetal movements.    O:   /78   Pulse 104   Wt 100.4 kg (221 lb 4.8 oz)   LMP 2021 (Exact Date)   BMI 37.99 kg/m    Gen: Well-appearing, NAD    BPP today     Assessment:  Ms. Hodan Iniguez is a 41 year old yo  here for OB follow up. She is currently 36w1d.  Her pregnancy is complicated by advanced maternal age, history of maternal alcohol use early in pregnancy, maternal hypothyroidism, polyhydramnios.     Plan:  # Routine Prenatal Care  -- Dating: LMP=7 week US ELEN: 2022  -- PNLs:              O Positive, AB screen neg              RPR nr              Hep B S Ag neg              Hep C neg              Rubella immune              HIV neg              GC/Chlam neg     -- Genetic Screening: [place] low risk male, SMA negative, CF negative  -- Anatomy US: MFM normal anatomy  -- Immunizations: Declines flu shot, Tdap 3/15  -- 3rd TM labs including CBC, RPR: RPR nr, Hgb 10.9  -- 1 hr GTT: 113  -- GBS: today  -- Postpartum Planning: To be discussed   -- Delivery [id number] for polyhydramnios  -- Return to clinic in 1 weeks for OB follow up visit  -- Planning to do at next visit: follow up BPPs     # Polyhydramnios  -- JOLEEN 26.5 cm on 3/30--> 33.5--> 30.1cm-->32 cm               Discussed with worsening polyhydramnios, and being far from delivery, recommend [facility] follow up US. We do not have any availability at our facility for over 1 month, so will recommend traveling to the [facility] for sooner appt- she declined to do this  --  Originally with mild poly (26 cm) anticipated IOL between [id number]-- similar to AMA timeline-- but this may need to be sooner based on results from MFM recommendation     # Hypothyroidism  -- Levothyroxine now 150 mcg/day  -- Check TSH q trimester               TSH improved from 32 in 2021--> 0.81 on  : 0.04, dropped levothyroxine dose to 150 mcg down from 225mcg     # Advanced Maternal Age  -- 32 week growth US               68%ile, BPD 98% and HC 98%ile, AC 75%ile  -- 36 week growth US   93%ile, 3387 grams, BPD 90%, HC 92% AC 90%, FL 2%  --  testing with weekly BPPs at 34 weeks    Sasha Keller MD  OB/GYN  2022 1:57 PM

## 2022-04-28 ENCOUNTER — TELEPHONE (OUTPATIENT)
Dept: FAMILY MEDICINE | Facility: OTHER | Age: 42
End: 2022-04-28
Payer: COMMERCIAL

## 2022-04-28 LAB — GP B STREP DNA SPEC QL NAA+PROBE: NEGATIVE

## 2022-04-28 RX ORDER — SERTRALINE HYDROCHLORIDE 100 MG/1
150 TABLET, FILM COATED ORAL DAILY
Qty: 90 TABLET | Refills: 1 | Status: SHIPPED | OUTPATIENT
Start: 2022-04-28 | End: 2022-08-18

## 2022-04-28 NOTE — TELEPHONE ENCOUNTER
Refilled Rx.  Please patient know that she is due for her annual physical.  Toña Beckham PA-C ..................4/28/2022 11:39 AM

## 2022-04-28 NOTE — TELEPHONE ENCOUNTER
Patient was calling a unit 4 nurse back she got a message they wanted to know what pharmacy she uses--[facility] [place] it is

## 2022-04-28 NOTE — TELEPHONE ENCOUNTER
Left message to call back. Need to verify pharmacy.  Jeny Richard LPN....................  4/28/2022   8:35 AM

## 2022-04-29 NOTE — TELEPHONE ENCOUNTER
Left message on patient's voicemail informing her she is due for an annual physical prior to further refills. Clinic number left for patient to call to schedule.     Hodan Colin CMA on 4/29/2022 at 12:13 PM

## 2022-05-03 ENCOUNTER — HOSPITAL ENCOUNTER (OUTPATIENT)
Dept: ULTRASOUND IMAGING | Facility: OTHER | Age: 42
Discharge: HOME | End: 2022-05-03
Attending: STUDENT IN AN ORGANIZED HEALTH CARE EDUCATION/TRAINING PROGRAM
Payer: COMMERCIAL

## 2022-05-03 ENCOUNTER — PRENATAL OFFICE VISIT (OUTPATIENT)
Dept: OBGYN | Facility: OTHER | Age: 42
End: 2022-05-03
Attending: STUDENT IN AN ORGANIZED HEALTH CARE EDUCATION/TRAINING PROGRAM
Payer: COMMERCIAL

## 2022-05-03 VITALS
WEIGHT: 221.6 LBS | BODY MASS INDEX: 38.04 KG/M2 | SYSTOLIC BLOOD PRESSURE: 130 MMHG | DIASTOLIC BLOOD PRESSURE: 70 MMHG | HEART RATE: 104 BPM

## 2022-05-03 DIAGNOSIS — E03.9 HYPOTHYROIDISM AFFECTING PREGNANCY IN THIRD TRIMESTER: ICD-10-CM

## 2022-05-03 DIAGNOSIS — O99.283 HYPOTHYROIDISM AFFECTING PREGNANCY IN THIRD TRIMESTER: Primary | ICD-10-CM

## 2022-05-03 DIAGNOSIS — O09.523 MULTIGRAVIDA OF ADVANCED MATERNAL AGE IN THIRD TRIMESTER: ICD-10-CM

## 2022-05-03 DIAGNOSIS — O99.283 HYPOTHYROIDISM AFFECTING PREGNANCY IN THIRD TRIMESTER: ICD-10-CM

## 2022-05-03 DIAGNOSIS — E03.9 HYPOTHYROIDISM AFFECTING PREGNANCY IN THIRD TRIMESTER: Primary | ICD-10-CM

## 2022-05-03 DIAGNOSIS — O40.3XX0 POLYHYDRAMNIOS IN THIRD TRIMESTER COMPLICATION, SINGLE OR UNSPECIFIED FETUS: ICD-10-CM

## 2022-05-03 DIAGNOSIS — O99.012 ANEMIA AFFECTING PREGNANCY IN SECOND TRIMESTER: ICD-10-CM

## 2022-05-03 PROCEDURE — 99207 PR OB VISIT-NO CHARGE - GICH ONLY: CPT | Performed by: STUDENT IN AN ORGANIZED HEALTH CARE EDUCATION/TRAINING PROGRAM

## 2022-05-03 PROCEDURE — 76819 FETAL BIOPHYS PROFIL W/O NST: CPT

## 2022-05-03 NOTE — PROGRESS NOTES
Return OB Visit    S: Ms. Iniguez is feeling well today. She has no acute concerns. Denies leaking of fluid, vaginal bleeding, painful contractions. Notes fetal movements.    O:   /70   Pulse 104   Wt 100.5 kg (221 lb 9.6 oz)   LMP 2021 (Exact Date)   BMI 38.04 kg/m      Gen: Well-appearing, NAD   BPP today   JOLEEN 27 cm      Assessment:  Ms. Hodan Iniguez is a 41 year old yo  here for OB follow up. She is currently 37w1d.  Her pregnancy is complicated by advanced maternal age, history of maternal alcohol use early in pregnancy, maternal hypothyroidism, polyhydramnios.     Plan:  # Routine Prenatal Care  -- Dating: LMP=7 week US ELEN: 2022  -- PNLs:              O Positive, AB screen neg              RPR nr              Hep B S Ag neg              Hep C neg              Rubella immune              HIV neg              GC/Chlam neg     -- Genetic Screening: [place] low risk male, SMA negative, CF negative  -- Anatomy US: [facility] normal anatomy  -- Immunizations: Declines flu shot, Tdap 3/15  -- 3rd TM labs including CBC, RPR: RPR nr, Hgb 10.9  -- 1 hr GTT: 113  -- GBS: negative  -- Postpartum Planning: To be discussed   -- Delivery [id number] for polyhydramnios  -- Return to clinic in 1 weeks for OB follow up visit  -- Planning to do at next visit: follow up BPPs, IOL date (paperwork sent for ripening  to )     # Polyhydramnios  -- JOLEEN 26.5 cm on 3/30--> 33.5--> 30.1cm-->32 cm--> 27cm today               Declined prior recommendation for MF recommendation  --  Originally with mild poly (26 cm) anticipated IOL between [id number]-- similar to AMA timeline-- but this may need to be sooner based on results from M recommendation     # Hypothyroidism  -- Levothyroxine now 150 mcg/day  -- Check TSH q trimester               TSH improved from 32 in 2021--> 0.81 on : 0.04, dropped levothyroxine dose to 150 mcg down from 225mcg     # Advanced Maternal  Age  -- 32 week growth US               68%ile, BPD 98% and HC 98%ile, AC 75%ile  -- 36 week growth US               93%ile, 3387 grams, BPD 90%, HC 92% AC 90%, FL 2%  --  testing with weekly BPPs at 34 weeks    Sasha Keller MD  OB/GYN  2022 11:36 PM

## 2022-05-03 NOTE — NURSING NOTE
Pt presents to clinic today for prenatal care 37w1d. Pt denies any  bleeding, or leakage of fluid at this time. States baby is moving good. Has noticed some contractions and pelvic pressure.      Medication Reconciliation: complete  Trice Alfaro LPN

## 2022-05-06 ENCOUNTER — HOSPITAL ENCOUNTER (OUTPATIENT)
Facility: OTHER | Age: 42
Discharge: HOME | End: 2022-05-07
Attending: OBSTETRICS & GYNECOLOGY | Admitting: OBSTETRICS & GYNECOLOGY
Payer: COMMERCIAL

## 2022-05-06 VITALS
SYSTOLIC BLOOD PRESSURE: 121 MMHG | TEMPERATURE: 96.3 F | DIASTOLIC BLOOD PRESSURE: 75 MMHG | RESPIRATION RATE: 18 BRPM | HEART RATE: 90 BPM | OXYGEN SATURATION: 98 %

## 2022-05-06 DIAGNOSIS — Z36.89 ENCOUNTER FOR TRIAGE IN PREGNANT PATIENT: ICD-10-CM

## 2022-05-06 LAB
ALBUMIN SERPL-MCNC: 3.2 G/DL (ref 3.5–5.7)
ALBUMIN UR-MCNC: 20 MG/DL
ALP SERPL-CCNC: 115 U/L (ref 34–104)
ALT SERPL W P-5'-P-CCNC: 9 U/L (ref 7–52)
APPEARANCE UR: CLEAR
AST SERPL W P-5'-P-CCNC: 14 U/L (ref 13–39)
BACTERIA #/AREA URNS HPF: ABNORMAL /HPF
BASOPHILS # BLD AUTO: 0.1 10E3/UL (ref 0–0.2)
BASOPHILS NFR BLD AUTO: 0 %
BILIRUB DIRECT SERPL-MCNC: <0.1 MG/DL (ref 0–0.2)
BILIRUB SERPL-MCNC: 0.2 MG/DL (ref 0.3–1)
BILIRUB UR QL STRIP: NEGATIVE
COLOR UR AUTO: ABNORMAL
CREAT SERPL-MCNC: 0.81 MG/DL (ref 0.6–1.2)
EOSINOPHIL # BLD AUTO: 0.2 10E3/UL (ref 0–0.7)
EOSINOPHIL NFR BLD AUTO: 1 %
ERYTHROCYTE [DISTWIDTH] IN BLOOD BY AUTOMATED COUNT: 13 % (ref 10–15)
GFR SERPL CREATININE-BSD FRML MDRD: >90 ML/MIN/1.73M2
GLUCOSE UR STRIP-MCNC: NEGATIVE MG/DL
HCT VFR BLD AUTO: 36.1 % (ref 35–47)
HGB BLD-MCNC: 12.1 G/DL (ref 11.7–15.7)
HGB UR QL STRIP: NEGATIVE
IMM GRANULOCYTES # BLD: 0.1 10E3/UL
IMM GRANULOCYTES NFR BLD: 0 %
KETONES UR STRIP-MCNC: NEGATIVE MG/DL
LEUKOCYTE ESTERASE UR QL STRIP: NEGATIVE
LYMPHOCYTES # BLD AUTO: 3.2 10E3/UL (ref 0.8–5.3)
LYMPHOCYTES NFR BLD AUTO: 22 %
MCH RBC QN AUTO: 30.3 PG (ref 26.5–33)
MCHC RBC AUTO-ENTMCNC: 33.5 G/DL (ref 31.5–36.5)
MCV RBC AUTO: 91 FL (ref 78–100)
MONOCYTES # BLD AUTO: 1.4 10E3/UL (ref 0–1.3)
MONOCYTES NFR BLD AUTO: 9 %
MUCOUS THREADS #/AREA URNS LPF: PRESENT /LPF
NEUTROPHILS # BLD AUTO: 9.7 10E3/UL (ref 1.6–8.3)
NEUTROPHILS NFR BLD AUTO: 68 %
NITRATE UR QL: NEGATIVE
NRBC # BLD AUTO: 0 10E3/UL
NRBC BLD AUTO-RTO: 0 /100
PH UR STRIP: 6.5 [PH] (ref 5–9)
PLATELET # BLD AUTO: 261 10E3/UL (ref 150–450)
PROT SERPL-MCNC: 6.1 G/DL (ref 6.4–8.9)
RBC # BLD AUTO: 3.99 10E6/UL (ref 3.8–5.2)
RBC URINE: <1 /HPF
SP GR UR STRIP: 1.03 (ref 1–1.03)
SQUAMOUS EPITHELIAL: 1 /HPF
UROBILINOGEN UR STRIP-MCNC: NORMAL MG/DL
WBC # BLD AUTO: 14.6 10E3/UL (ref 4–11)
WBC URINE: 1 /HPF

## 2022-05-06 PROCEDURE — 81001 URINALYSIS AUTO W/SCOPE: CPT | Performed by: OBSTETRICS & GYNECOLOGY

## 2022-05-06 PROCEDURE — 80076 HEPATIC FUNCTION PANEL: CPT | Performed by: OBSTETRICS & GYNECOLOGY

## 2022-05-06 PROCEDURE — 36415 COLL VENOUS BLD VENIPUNCTURE: CPT | Performed by: OBSTETRICS & GYNECOLOGY

## 2022-05-06 PROCEDURE — 999N000104 HC STATISTIC NO CHARGE: Performed by: FAMILY MEDICINE

## 2022-05-06 PROCEDURE — 96360 HYDRATION IV INFUSION INIT: CPT

## 2022-05-06 PROCEDURE — 258N000003 HC RX IP 258 OP 636: Performed by: OBSTETRICS & GYNECOLOGY

## 2022-05-06 PROCEDURE — 85025 COMPLETE CBC W/AUTO DIFF WBC: CPT | Performed by: OBSTETRICS & GYNECOLOGY

## 2022-05-06 PROCEDURE — 82565 ASSAY OF CREATININE: CPT | Performed by: OBSTETRICS & GYNECOLOGY

## 2022-05-06 RX ORDER — SODIUM CHLORIDE, SODIUM LACTATE, POTASSIUM CHLORIDE, CALCIUM CHLORIDE 600; 310; 30; 20 MG/100ML; MG/100ML; MG/100ML; MG/100ML
INJECTION, SOLUTION INTRAVENOUS CONTINUOUS
Status: DISCONTINUED | OUTPATIENT
Start: 2022-05-07 | End: 2022-05-07 | Stop reason: HOSPADM

## 2022-05-06 RX ADMIN — SODIUM CHLORIDE, POTASSIUM CHLORIDE, SODIUM LACTATE AND CALCIUM CHLORIDE 1000 ML: 600; 310; 30; 20 INJECTION, SOLUTION INTRAVENOUS at 22:43

## 2022-05-06 RX ADMIN — SODIUM CHLORIDE, POTASSIUM CHLORIDE, SODIUM LACTATE AND CALCIUM CHLORIDE: 600; 310; 30; 20 INJECTION, SOLUTION INTRAVENOUS at 23:52

## 2022-05-07 PROCEDURE — G0463 HOSPITAL OUTPT CLINIC VISIT: HCPCS | Mod: 25

## 2022-05-07 NOTE — PROGRESS NOTES
Patient:   INGRID CANALES            MRN: [id number]            FIN: [id number]               Age:   39 years     Sex:  Female     :  1982   Associated Diagnoses:   None   Author:   Oni LIRIANO, Rohan      Procedure   EKG procedure   Indication: Hypocalcemia.     EKG findings   Interpretation: by primary care provider.     Sinus rhythm. Normal QTc and RI interval with no qwaves or ST changes. Good R wave progression .        Impression and Plan   Diagnosis     normal EKG.   Infant very active and extremely difficult to monitor HR tracing. FHR audible on monitor but not tracing at times. Writer had to sit with pt and hand-hold monitor on pt's abdomin to obtain 20 minute NST. FHR 135bpm. Moderate variability noted, accelerations noted, no decelerations noted. Pt states abdominal cramping/contractions improved. Reports feeling a contraction every 10-20 minutes. /76. Cervix remains 0-1cm, firm.    Pt states she drinks a lot of [employer] and [employer]. Pt educated to cut down on caffeine intake and increase water intake.     Pt states she would like to discharge home. Category 1 tracing obtained and pt educated on reasons to return to [facility]. Pt discharged home.

## 2022-05-07 NOTE — PROGRESS NOTES
Pt arrived from ER reporting abdominal cramping. Denies leaking of fluid and bleeding. Pt placed on EFM/EUM, FHR was initially 130 but then increased to 170bpm, moderate variability noted. [place] notes uterine activity every 1-2 minutes lasting 30-60 seconds. Cervix 0-1cm, firm. BP elevated at initial assessment, but has been trending down, see flowsheet.     MD notified. Telephone orders with verbal read back placed for CBC, hepatic panel, serum creatinine, and LR bolus. Will continue to monitor.        05/06/22 2126 05/06/22 2129 05/06/22 2141   Vital Signs   BP (!) 176/99 (!) 161/92 137/89      05/06/22 2212 05/06/22 2227   Vital Signs   /75 117/66

## 2022-05-07 NOTE — PROGRESS NOTES
LR bolus infusing. Cervix unchanged. Labs unremarkable. Pt states abdominal pain/contractions are better, states she does feel one once in a while. . MD notified. Ordered to finish bolus and continue maintenance fluids. Monitor pt until category 1 tracing is obtained or admit pt to observation status if category 2 tracing continues.     /75   Pulse 90   Temp (!) 96.3  F (35.7  C) (Temporal)   Resp 18   LMP 08/16/2021 (Exact Date)   SpO2 98%

## 2022-05-07 NOTE — DISCHARGE INSTRUCTIONS
Return to [facility] for the following:    Headaches and blurred vision    Uterine contractions that are consistent and of increased intensity    Sharp abdominal pain that does not go away    Decreased fetal movement    Vaginal bleeding    Leaking of amniotic fluid    Call if any questions or concerns: [phone]

## 2022-05-09 DIAGNOSIS — E28.2 PCOS (POLYCYSTIC OVARIAN SYNDROME): ICD-10-CM

## 2022-05-10 ENCOUNTER — PRENATAL OFFICE VISIT (OUTPATIENT)
Dept: OBGYN | Facility: OTHER | Age: 42
End: 2022-05-10
Attending: STUDENT IN AN ORGANIZED HEALTH CARE EDUCATION/TRAINING PROGRAM
Payer: COMMERCIAL

## 2022-05-10 ENCOUNTER — HOSPITAL ENCOUNTER (OUTPATIENT)
Dept: ULTRASOUND IMAGING | Facility: OTHER | Age: 42
Discharge: HOME | End: 2022-05-10
Attending: STUDENT IN AN ORGANIZED HEALTH CARE EDUCATION/TRAINING PROGRAM
Payer: COMMERCIAL

## 2022-05-10 VITALS
WEIGHT: 225.8 LBS | BODY MASS INDEX: 38.76 KG/M2 | HEART RATE: 94 BPM | DIASTOLIC BLOOD PRESSURE: 84 MMHG | SYSTOLIC BLOOD PRESSURE: 126 MMHG

## 2022-05-10 DIAGNOSIS — O99.283 HYPOTHYROIDISM AFFECTING PREGNANCY IN THIRD TRIMESTER: Primary | ICD-10-CM

## 2022-05-10 DIAGNOSIS — O40.3XX0 POLYHYDRAMNIOS IN THIRD TRIMESTER COMPLICATION, SINGLE OR UNSPECIFIED FETUS: ICD-10-CM

## 2022-05-10 DIAGNOSIS — O09.523 MULTIGRAVIDA OF ADVANCED MATERNAL AGE IN THIRD TRIMESTER: ICD-10-CM

## 2022-05-10 DIAGNOSIS — E03.9 HYPOTHYROIDISM AFFECTING PREGNANCY IN THIRD TRIMESTER: Primary | ICD-10-CM

## 2022-05-10 DIAGNOSIS — E03.9 HYPOTHYROIDISM AFFECTING PREGNANCY IN THIRD TRIMESTER: ICD-10-CM

## 2022-05-10 DIAGNOSIS — O99.283 HYPOTHYROIDISM AFFECTING PREGNANCY IN THIRD TRIMESTER: ICD-10-CM

## 2022-05-10 PROCEDURE — 99207 PR OB VISIT-NO CHARGE - GICH ONLY: CPT | Performed by: STUDENT IN AN ORGANIZED HEALTH CARE EDUCATION/TRAINING PROGRAM

## 2022-05-10 PROCEDURE — 76819 FETAL BIOPHYS PROFIL W/O NST: CPT

## 2022-05-10 NOTE — NURSING NOTE
Pt presents to clinic today for prenatal care 38w1d. Pt denies any  bleeding, or leakage of fluid at this time. States baby is moving good, has noticed contractions and a lot of pelvic pressure.      Medication Reconciliation: complete  Trice Alfaro LPN

## 2022-05-10 NOTE — TELEPHONE ENCOUNTER
[facility] sent Rx request for the following:      Requested Prescriptions   Pending Prescriptions Disp Refills     metFORMIN (GLUCOPHAGE) 500 MG tablet 180 tablet 3     Sig: TAKE 1 TABLET(500 MG) BY MOUTH BID with meals       Biguanide Agents Failed - 5/10/2022  9:03 AM        Failed - Patient has documented A1c within the specified period of time.     If HgbA1C is 8 or greater, it needs to be on file within the past 3 months.  If less than 8, must be on file within the past 6 months.     Recent Labs   Lab Test 04/08/21  1531   A1C 5.4           Failed - Patient is not pregnant        Failed - Patient has not had a positive pregnancy test within the past 12 mos.         Failed - Recent (6 mo) or future (30 days) visit within the authorizing provider's specialty     Last Prescription Date:   4/8/21  Last Fill Qty/Refills:         180, R-3    Last Office Visit:              9/17/21   Future Office visit:           None    Zina Kim RN .............. 5/10/2022  9:04 AM

## 2022-05-10 NOTE — PROGRESS NOTES
Return OB Visit    S: Ms. Iniguez is feeling well today. She has no acute concerns. Denies leaking of fluid, vaginal bleeding, painful contractions. Notes fetal movements.    O: /84   Pulse 94   Wt 102.4 kg (225 lb 12.8 oz)   LMP 2021 (Exact Date)   BMI 38.76 kg/m    Gen: Well-appearing, NAD    BPP today     SVE: 50/-3, soft, anterior.     Assessment:  Ms. Hodan Iniguez is a 41 year old yo  here for OB follow up. She is currently 38w1d.  Her pregnancy is complicated by advanced maternal age, history of maternal alcohol use early in pregnancy, maternal hypothyroidism, polyhydramnios.     Plan:  # Routine Prenatal Care  -- Dating: LMP=7 week US ELEN: 2022  -- PNLs:              O Positive, AB screen neg              RPR nr              Hep B S Ag neg              Hep C neg              Rubella immune              HIV neg              GC/Chlam neg     -- Genetic Screening: [place] low risk male, SMA negative, CF negative  -- Anatomy US: [facility] normal anatomy  -- Immunizations: Declines flu shot, Tdap 3/15  -- 3rd TM labs including CBC, RPR: RPR nr, Hgb 10.9  -- 1 hr GTT: 113  -- GBS: negative  -- Postpartum Planning: breastfeeding  -- Delivery [id number] for polyhydramnios: IOL set for ripening on  5pm, pitocin on   -- Return to clinic in 1 weeks for OB follow up visit  -- Planning to do at next visit: follow up BPPs     # Polyhydramnios  -- JOLEEN 26.5 cm on 3/30--> 33.5--> 30.1cm-->32 cm--> 29               Declined prior recommendation for [facility] recommendation  --  Originally with mild poly (26 cm) anticipated IOL between [id number]-- similar to AMA timeline-- but this may need to be sooner based on results from [facility] recommendation     # Hypothyroidism  -- Levothyroxine now 150 mcg/day  -- Check TSH q trimester               TSH improved from 32 in 2021--> 0.81 on : 0.04, dropped levothyroxine dose to 150 mcg down from 225mcg     # Advanced  Maternal Age  -- 32 week growth US               68%ile, BPD 98% and HC 98%ile, AC 75%ile  -- 36 week growth US               93%ile, 3387 grams, BPD 90%, HC 92% AC 90%, FL 2%  --  testing with weekly BPPs at 34 weeks    Sasha Keller MD  OB/GYN  5/10/2022 3:35 PM

## 2022-05-11 NOTE — TELEPHONE ENCOUNTER
Refilled medication.  Please let the patient know that she is due for a physical after baby is born.  Toña Beckham PA-C.......... 5/11/2022 9:31 AM

## 2022-05-13 ENCOUNTER — MYC MEDICAL ADVICE (OUTPATIENT)
Dept: FAMILY MEDICINE | Facility: OTHER | Age: 42
End: 2022-05-13
Payer: COMMERCIAL

## 2022-05-13 DIAGNOSIS — E03.9 HYPOTHYROIDISM AFFECTING PREGNANCY IN FIRST TRIMESTER: ICD-10-CM

## 2022-05-13 DIAGNOSIS — O99.281 HYPOTHYROIDISM AFFECTING PREGNANCY IN FIRST TRIMESTER: ICD-10-CM

## 2022-05-16 RX ORDER — LEVOTHYROXINE SODIUM 75 UG/1
150 TABLET ORAL DAILY
Qty: 180 TABLET | Refills: 1 | Status: SHIPPED | OUTPATIENT
Start: 2022-05-16 | End: 2022-11-02 | Stop reason: DRUGHIGH

## 2022-05-17 ENCOUNTER — HOSPITAL ENCOUNTER (OUTPATIENT)
Dept: ULTRASOUND IMAGING | Facility: OTHER | Age: 42
Discharge: HOME | End: 2022-05-17
Attending: STUDENT IN AN ORGANIZED HEALTH CARE EDUCATION/TRAINING PROGRAM
Payer: COMMERCIAL

## 2022-05-17 ENCOUNTER — PRENATAL OFFICE VISIT (OUTPATIENT)
Dept: OBGYN | Facility: OTHER | Age: 42
End: 2022-05-17
Attending: STUDENT IN AN ORGANIZED HEALTH CARE EDUCATION/TRAINING PROGRAM
Payer: COMMERCIAL

## 2022-05-17 VITALS
HEART RATE: 90 BPM | DIASTOLIC BLOOD PRESSURE: 72 MMHG | BODY MASS INDEX: 38.79 KG/M2 | SYSTOLIC BLOOD PRESSURE: 122 MMHG | WEIGHT: 226 LBS

## 2022-05-17 DIAGNOSIS — O40.3XX0 POLYHYDRAMNIOS IN THIRD TRIMESTER COMPLICATION, SINGLE OR UNSPECIFIED FETUS: ICD-10-CM

## 2022-05-17 DIAGNOSIS — O09.523 MULTIGRAVIDA OF ADVANCED MATERNAL AGE IN THIRD TRIMESTER: ICD-10-CM

## 2022-05-17 DIAGNOSIS — E03.9 HYPOTHYROIDISM AFFECTING PREGNANCY IN THIRD TRIMESTER: Primary | ICD-10-CM

## 2022-05-17 DIAGNOSIS — E03.9 HYPOTHYROIDISM AFFECTING PREGNANCY IN THIRD TRIMESTER: ICD-10-CM

## 2022-05-17 DIAGNOSIS — O99.283 HYPOTHYROIDISM AFFECTING PREGNANCY IN THIRD TRIMESTER: Primary | ICD-10-CM

## 2022-05-17 DIAGNOSIS — O99.283 HYPOTHYROIDISM AFFECTING PREGNANCY IN THIRD TRIMESTER: ICD-10-CM

## 2022-05-17 PROCEDURE — 76819 FETAL BIOPHYS PROFIL W/O NST: CPT

## 2022-05-17 PROCEDURE — 99207 PR OB VISIT-NO CHARGE - GICH ONLY: CPT | Performed by: STUDENT IN AN ORGANIZED HEALTH CARE EDUCATION/TRAINING PROGRAM

## 2022-05-17 ASSESSMENT — PAIN SCALES - GENERAL: PAINLEVEL: NO PAIN (0)

## 2022-05-17 NOTE — PROGRESS NOTES
Return OB Visit    S: Ms. Iniguez is feeling well today. She has no acute concerns. Denies leaking of fluid, vaginal bleeding, painful contractions. Notes fetal movements. BPP today was 8/8 but showed continued and mildly progressing polyhydramnios.    O:     /72   Pulse 90   Wt 102.5 kg (226 lb)   LMP 2021 (Exact Date)   BMI 38.79 kg/m      Gen: Well-appearing, NAD    BPP 8/8 today, cephalic, JOLEEN 34.4 cm    Assessment:  Ms. Hodan Iniguez is a 41 year old yo  here for OB follow up. She is currently 39w1d.  Her pregnancy is complicated by advanced maternal age, history of maternal alcohol use early in pregnancy, maternal hypothyroidism, polyhydramnios.     Plan:  # Routine Prenatal Care  -- Dating: LMP=7 week US ELEN: 2022  -- PNLs:              O Positive, AB screen neg              RPR nr              Hep B S Ag neg               Hep C neg              Rubella immune              HIV neg              GC/Chlam neg     -- Genetic Screening: [place] low risk male, SMA negative, CF negative  -- Anatomy US: MFM normal anatomy  -- Immunizations: Declines flu shot, Tdap 3/15  -- 3rd TM labs including CBC, RPR: RPR nr, Hgb 10.9  -- 1 hr GTT: 113  -- GBS: negative  -- Postpartum Planning: breastfeeding  -- Delivery [id number] for polyhydramnios: IOL set for ripening on  5pm, pitocin on      # Polyhydramnios  -- JOLEEN 26.5 cm on 3/30--> 33.5--> 30.1cm-->32 cm--> 29--> 34 cm               Declined prior recommendation for MFM recommendation  --  Originally with mild poly (26 cm) anticipated IOL between [id number]-- similar to AMA timeline     # Hypothyroidism  -- Levothyroxine 150 mcg/day  -- Check TSH q trimester               TSH improved from 32 in 2021--> 0.81 on : 0.04, dropped levothyroxine dose to 150 mcg down from 225mcg     # Advanced Maternal Age  -- 32 week growth US               68%ile, BPD 98% and HC 98%ile, AC 75%ile  -- 36 week growth  US               93%ile, 3387 grams, BPD 90%, HC 92% AC 90%, FL 2%  --  testing with weekly BPPs at 34 weeks    Sasha Keller MD  OB/GYN  2022 3:07 PM

## 2022-05-17 NOTE — NURSING NOTE
"Chief Complaint   Patient presents with     Prenatal Care     39 weeks 1 day   Pt presents to clinic today for prenatal care 39 weeks 1 day. Pt denies any bleeding, or leakage of fluid at this time. States baby is moving good. Patient denies contractions.     Initial /72   Pulse 90   Wt 102.5 kg (226 lb)   LMP 08/16/2021 (Exact Date)   BMI 38.79 kg/m   Estimated body mass index is 38.79 kg/m  as calculated from the following:    Height as of 11/11/21: 1.626 m (5' 4\").    Weight as of this encounter: 102.5 kg (226 lb).  Medication Reconciliation: complete          Mireya Rose LPN  "

## 2022-05-18 ENCOUNTER — HOSPITAL ENCOUNTER (INPATIENT)
Facility: OTHER | Age: 42
LOS: 3 days | Discharge: HOME | End: 2022-05-21
Attending: STUDENT IN AN ORGANIZED HEALTH CARE EDUCATION/TRAINING PROGRAM | Admitting: STUDENT IN AN ORGANIZED HEALTH CARE EDUCATION/TRAINING PROGRAM
Payer: COMMERCIAL

## 2022-05-18 DIAGNOSIS — E03.9 HYPOTHYROIDISM AFFECTING PREGNANCY IN THIRD TRIMESTER: ICD-10-CM

## 2022-05-18 DIAGNOSIS — O40.3XX0 POLYHYDRAMNIOS IN THIRD TRIMESTER COMPLICATION, SINGLE OR UNSPECIFIED FETUS: ICD-10-CM

## 2022-05-18 DIAGNOSIS — O99.283 HYPOTHYROIDISM AFFECTING PREGNANCY IN THIRD TRIMESTER: ICD-10-CM

## 2022-05-18 DIAGNOSIS — Z98.891 S/P CESAREAN SECTION: Primary | ICD-10-CM

## 2022-05-18 LAB
ABO/RH(D): NORMAL
ANTIBODY SCREEN: NEGATIVE
ERYTHROCYTE [DISTWIDTH] IN BLOOD BY AUTOMATED COUNT: 13.3 % (ref 10–15)
FLUAV RNA SPEC QL NAA+PROBE: NEGATIVE
FLUBV RNA RESP QL NAA+PROBE: NEGATIVE
HCT VFR BLD AUTO: 38 % (ref 35–47)
HGB BLD-MCNC: 12.6 G/DL (ref 11.7–15.7)
MCH RBC QN AUTO: 30.6 PG (ref 26.5–33)
MCHC RBC AUTO-ENTMCNC: 33.2 G/DL (ref 31.5–36.5)
MCV RBC AUTO: 92 FL (ref 78–100)
PLATELET # BLD AUTO: 269 10E3/UL (ref 150–450)
RBC # BLD AUTO: 4.12 10E6/UL (ref 3.8–5.2)
RSV RNA SPEC NAA+PROBE: NEGATIVE
SARS-COV-2 RNA RESP QL NAA+PROBE: NEGATIVE
SPECIMEN EXPIRATION DATE: NORMAL
WBC # BLD AUTO: 13.9 10E3/UL (ref 4–11)

## 2022-05-18 PROCEDURE — 36415 COLL VENOUS BLD VENIPUNCTURE: CPT | Performed by: STUDENT IN AN ORGANIZED HEALTH CARE EDUCATION/TRAINING PROGRAM

## 2022-05-18 PROCEDURE — 3E0P7VZ INTRODUCTION OF HORMONE INTO FEMALE REPRODUCTIVE, VIA NATURAL OR ARTIFICIAL OPENING: ICD-10-PCS | Performed by: OBSTETRICS & GYNECOLOGY

## 2022-05-18 PROCEDURE — 86850 RBC ANTIBODY SCREEN: CPT | Performed by: STUDENT IN AN ORGANIZED HEALTH CARE EDUCATION/TRAINING PROGRAM

## 2022-05-18 PROCEDURE — 86780 TREPONEMA PALLIDUM: CPT | Performed by: STUDENT IN AN ORGANIZED HEALTH CARE EDUCATION/TRAINING PROGRAM

## 2022-05-18 PROCEDURE — 87637 SARSCOV2&INF A&B&RSV AMP PRB: CPT | Performed by: STUDENT IN AN ORGANIZED HEALTH CARE EDUCATION/TRAINING PROGRAM

## 2022-05-18 PROCEDURE — 250N000013 HC RX MED GY IP 250 OP 250 PS 637: Performed by: STUDENT IN AN ORGANIZED HEALTH CARE EDUCATION/TRAINING PROGRAM

## 2022-05-18 PROCEDURE — 120N000001 HC R&B MED SURG/OB

## 2022-05-18 PROCEDURE — 85027 COMPLETE CBC AUTOMATED: CPT | Performed by: STUDENT IN AN ORGANIZED HEALTH CARE EDUCATION/TRAINING PROGRAM

## 2022-05-18 PROCEDURE — 86901 BLOOD TYPING SEROLOGIC RH(D): CPT | Performed by: STUDENT IN AN ORGANIZED HEALTH CARE EDUCATION/TRAINING PROGRAM

## 2022-05-18 RX ORDER — NALOXONE HYDROCHLORIDE 0.4 MG/ML
0.2 INJECTION, SOLUTION INTRAMUSCULAR; INTRAVENOUS; SUBCUTANEOUS
Status: DISCONTINUED | OUTPATIENT
Start: 2022-05-18 | End: 2022-05-19 | Stop reason: HOSPADM

## 2022-05-18 RX ORDER — ONDANSETRON 2 MG/ML
4 INJECTION INTRAMUSCULAR; INTRAVENOUS EVERY 6 HOURS PRN
Status: CANCELLED | OUTPATIENT
Start: 2022-05-18

## 2022-05-18 RX ORDER — MISOPROSTOL 100 UG/1
400 TABLET ORAL
Status: CANCELLED | OUTPATIENT
Start: 2022-05-18

## 2022-05-18 RX ORDER — NALOXONE HYDROCHLORIDE 0.4 MG/ML
0.4 INJECTION, SOLUTION INTRAMUSCULAR; INTRAVENOUS; SUBCUTANEOUS
Status: CANCELLED | OUTPATIENT
Start: 2022-05-18

## 2022-05-18 RX ORDER — NALOXONE HYDROCHLORIDE 0.4 MG/ML
0.2 INJECTION, SOLUTION INTRAMUSCULAR; INTRAVENOUS; SUBCUTANEOUS
Status: CANCELLED | OUTPATIENT
Start: 2022-05-18

## 2022-05-18 RX ORDER — PROCHLORPERAZINE MALEATE 10 MG
10 TABLET ORAL EVERY 6 HOURS PRN
Status: DISCONTINUED | OUTPATIENT
Start: 2022-05-18 | End: 2022-05-19 | Stop reason: HOSPADM

## 2022-05-18 RX ORDER — KETOROLAC TROMETHAMINE 30 MG/ML
30 INJECTION, SOLUTION INTRAMUSCULAR; INTRAVENOUS
Status: CANCELLED | OUTPATIENT
Start: 2022-05-18 | End: 2022-05-23

## 2022-05-18 RX ORDER — MORPHINE SULFATE 4 MG/ML
4 INJECTION, SOLUTION INTRAMUSCULAR; INTRAVENOUS
Status: CANCELLED | OUTPATIENT
Start: 2022-05-18

## 2022-05-18 RX ORDER — MISOPROSTOL 100 UG/1
25 TABLET ORAL EVERY 4 HOURS PRN
Status: CANCELLED | OUTPATIENT
Start: 2022-05-18

## 2022-05-18 RX ORDER — IBUPROFEN 200 MG
800 TABLET ORAL
Status: CANCELLED | OUTPATIENT
Start: 2022-05-18 | End: 2022-05-23

## 2022-05-18 RX ORDER — OXYTOCIN 10 [USP'U]/ML
10 INJECTION, SOLUTION INTRAMUSCULAR; INTRAVENOUS
Status: DISCONTINUED | OUTPATIENT
Start: 2022-05-18 | End: 2022-05-19 | Stop reason: HOSPADM

## 2022-05-18 RX ORDER — METOCLOPRAMIDE 10 MG/1
10 TABLET ORAL EVERY 6 HOURS PRN
Status: CANCELLED | OUTPATIENT
Start: 2022-05-18

## 2022-05-18 RX ORDER — MISOPROSTOL 100 UG/1
400 TABLET ORAL
Status: DISCONTINUED | OUTPATIENT
Start: 2022-05-18 | End: 2022-05-19 | Stop reason: HOSPADM

## 2022-05-18 RX ORDER — CITRIC ACID/SODIUM CITRATE 334-500MG
30 SOLUTION, ORAL ORAL
Status: CANCELLED | OUTPATIENT
Start: 2022-05-18

## 2022-05-18 RX ORDER — METHYLERGONOVINE MALEATE 0.2 MG/ML
200 INJECTION INTRAVENOUS
Status: DISCONTINUED | OUTPATIENT
Start: 2022-05-18 | End: 2022-05-19 | Stop reason: HOSPADM

## 2022-05-18 RX ORDER — PROCHLORPERAZINE 25 MG
25 SUPPOSITORY, RECTAL RECTAL EVERY 12 HOURS PRN
Status: CANCELLED | OUTPATIENT
Start: 2022-05-18

## 2022-05-18 RX ORDER — OXYTOCIN/0.9 % SODIUM CHLORIDE 30/500 ML
340 PLASTIC BAG, INJECTION (ML) INTRAVENOUS CONTINUOUS PRN
Status: DISCONTINUED | OUTPATIENT
Start: 2022-05-18 | End: 2022-05-19 | Stop reason: HOSPADM

## 2022-05-18 RX ORDER — MORPHINE SULFATE 4 MG/ML
4 INJECTION, SOLUTION INTRAMUSCULAR; INTRAVENOUS
Status: DISCONTINUED | OUTPATIENT
Start: 2022-05-18 | End: 2022-05-19

## 2022-05-18 RX ORDER — TRANEXAMIC ACID 10 MG/ML
1 INJECTION, SOLUTION INTRAVENOUS EVERY 30 MIN PRN
Status: CANCELLED | OUTPATIENT
Start: 2022-05-18

## 2022-05-18 RX ORDER — METHYLERGONOVINE MALEATE 0.2 MG/ML
200 INJECTION INTRAVENOUS
Status: CANCELLED | OUTPATIENT
Start: 2022-05-18

## 2022-05-18 RX ORDER — METOCLOPRAMIDE HYDROCHLORIDE 5 MG/ML
10 INJECTION INTRAMUSCULAR; INTRAVENOUS EVERY 6 HOURS PRN
Status: CANCELLED | OUTPATIENT
Start: 2022-05-18

## 2022-05-18 RX ORDER — ONDANSETRON 4 MG/1
4 TABLET, ORALLY DISINTEGRATING ORAL EVERY 6 HOURS PRN
Status: DISCONTINUED | OUTPATIENT
Start: 2022-05-18 | End: 2022-05-19 | Stop reason: HOSPADM

## 2022-05-18 RX ORDER — NALOXONE HYDROCHLORIDE 0.4 MG/ML
0.4 INJECTION, SOLUTION INTRAMUSCULAR; INTRAVENOUS; SUBCUTANEOUS
Status: DISCONTINUED | OUTPATIENT
Start: 2022-05-18 | End: 2022-05-19 | Stop reason: HOSPADM

## 2022-05-18 RX ORDER — METOCLOPRAMIDE HYDROCHLORIDE 5 MG/ML
10 INJECTION INTRAMUSCULAR; INTRAVENOUS EVERY 6 HOURS PRN
Status: DISCONTINUED | OUTPATIENT
Start: 2022-05-18 | End: 2022-05-19 | Stop reason: HOSPADM

## 2022-05-18 RX ORDER — TRANEXAMIC ACID 10 MG/ML
1 INJECTION, SOLUTION INTRAVENOUS EVERY 30 MIN PRN
Status: DISCONTINUED | OUTPATIENT
Start: 2022-05-18 | End: 2022-05-19 | Stop reason: HOSPADM

## 2022-05-18 RX ORDER — OXYTOCIN/0.9 % SODIUM CHLORIDE 30/500 ML
100-340 PLASTIC BAG, INJECTION (ML) INTRAVENOUS CONTINUOUS PRN
Status: CANCELLED | OUTPATIENT
Start: 2022-05-18

## 2022-05-18 RX ORDER — ONDANSETRON 2 MG/ML
4 INJECTION INTRAMUSCULAR; INTRAVENOUS EVERY 6 HOURS PRN
Status: DISCONTINUED | OUTPATIENT
Start: 2022-05-18 | End: 2022-05-19 | Stop reason: HOSPADM

## 2022-05-18 RX ORDER — CITRIC ACID/SODIUM CITRATE 334-500MG
30 SOLUTION, ORAL ORAL
Status: DISCONTINUED | OUTPATIENT
Start: 2022-05-18 | End: 2022-05-19 | Stop reason: HOSPADM

## 2022-05-18 RX ORDER — OXYTOCIN 10 [USP'U]/ML
10 INJECTION, SOLUTION INTRAMUSCULAR; INTRAVENOUS
Status: CANCELLED | OUTPATIENT
Start: 2022-05-18

## 2022-05-18 RX ORDER — PROCHLORPERAZINE MALEATE 10 MG
10 TABLET ORAL EVERY 6 HOURS PRN
Status: CANCELLED | OUTPATIENT
Start: 2022-05-18

## 2022-05-18 RX ORDER — METOCLOPRAMIDE 10 MG/1
10 TABLET ORAL EVERY 6 HOURS PRN
Status: DISCONTINUED | OUTPATIENT
Start: 2022-05-18 | End: 2022-05-19 | Stop reason: HOSPADM

## 2022-05-18 RX ORDER — HYDROXYZINE HYDROCHLORIDE 25 MG/1
50 TABLET, FILM COATED ORAL
Status: DISCONTINUED | OUTPATIENT
Start: 2022-05-18 | End: 2022-05-19 | Stop reason: HOSPADM

## 2022-05-18 RX ORDER — CARBOPROST TROMETHAMINE 250 UG/ML
250 INJECTION, SOLUTION INTRAMUSCULAR
Status: DISCONTINUED | OUTPATIENT
Start: 2022-05-18 | End: 2022-05-19 | Stop reason: HOSPADM

## 2022-05-18 RX ORDER — HYDROXYZINE HYDROCHLORIDE 25 MG/1
50 TABLET, FILM COATED ORAL
Status: CANCELLED | OUTPATIENT
Start: 2022-05-18

## 2022-05-18 RX ORDER — PROCHLORPERAZINE 25 MG
25 SUPPOSITORY, RECTAL RECTAL EVERY 12 HOURS PRN
Status: DISCONTINUED | OUTPATIENT
Start: 2022-05-18 | End: 2022-05-19 | Stop reason: HOSPADM

## 2022-05-18 RX ORDER — MISOPROSTOL 100 UG/1
25 TABLET ORAL EVERY 4 HOURS PRN
Status: DISCONTINUED | OUTPATIENT
Start: 2022-05-18 | End: 2022-05-19 | Stop reason: HOSPADM

## 2022-05-18 RX ORDER — ONDANSETRON 4 MG/1
4 TABLET, ORALLY DISINTEGRATING ORAL EVERY 6 HOURS PRN
Status: CANCELLED | OUTPATIENT
Start: 2022-05-18

## 2022-05-18 RX ORDER — OXYTOCIN/0.9 % SODIUM CHLORIDE 30/500 ML
340 PLASTIC BAG, INJECTION (ML) INTRAVENOUS CONTINUOUS PRN
Status: CANCELLED | OUTPATIENT
Start: 2022-05-18

## 2022-05-18 RX ORDER — CARBOPROST TROMETHAMINE 250 UG/ML
250 INJECTION, SOLUTION INTRAMUSCULAR
Status: CANCELLED | OUTPATIENT
Start: 2022-05-18

## 2022-05-18 RX ADMIN — HYDROXYZINE HYDROCHLORIDE 50 MG: 25 TABLET, FILM COATED ORAL at 22:28

## 2022-05-18 RX ADMIN — MISOPROSTOL 25 MCG: 100 TABLET ORAL at 23:48

## 2022-05-18 RX ADMIN — MISOPROSTOL 25 MCG: 100 TABLET ORAL at 19:44

## 2022-05-18 ASSESSMENT — ACTIVITIES OF DAILY LIVING (ADL)
WEAR_GLASSES_OR_BLIND: NO
CHANGE_IN_FUNCTIONAL_STATUS_SINCE_ONSET_OF_CURRENT_ILLNESS/INJURY: NO
TOILETING_ISSUES: NO
DRESSING/BATHING_DIFFICULTY: NO
DIFFICULTY_EATING/SWALLOWING: NO
FALL_HISTORY_WITHIN_LAST_SIX_MONTHS: NO
DOING_ERRANDS_INDEPENDENTLY_DIFFICULTY: NO
CONCENTRATING,_REMEMBERING_OR_MAKING_DECISIONS_DIFFICULTY: NO
WALKING_OR_CLIMBING_STAIRS_DIFFICULTY: NO

## 2022-05-18 NOTE — PROGRESS NOTES
Pt admitted to room 404 for scheduled ripening for AMA and polyhydramnios. Pt mother-Lizzette and father-Hema with pt. FOB-Jey will come tomorrow per pt. EFM/EUM placed on pt. Category 1 tracing. FHR 150s. No contractions noted. Denies pain, and any leaking of fluid. Pt expecting baby boy, would like circumcision and all baby meds.

## 2022-05-18 NOTE — ADDENDUM NOTE
Addended by: ABHISHEK STEVENS on: 5/18/2022 07:44 AM     Modules accepted: Trinity Flynn     Methotrexate Counseling:  Patient counseled regarding adverse effects of methotrexate including but not limited to nausea, vomiting, abnormalities in liver function tests. Patients may develop mouth sores, rash, diarrhea, and abnormalities in blood counts. The patient understands that monitoring is required including LFT's and blood counts.  There is a rare possibility of scarring of the liver and lung problems that can occur when taking methotrexate. Persistent nausea, loss of appetite, pale stools, dark urine, cough, and shortness of breath should be reported immediately. Patient advised to discontinue methotrexate treatment at least three months before attempting to become pregnant.  I discussed the need for folate supplements while taking methotrexate.  These supplements can decrease side effects during methotrexate treatment. The patient verbalized understanding of the proper use and possible adverse effects of methotrexate.  All of the patient's questions and concerns were addressed.

## 2022-05-18 NOTE — H&P
[facility] Labor and Delivery Admission H&P    Hodan Iniguez MRN# [id number]   Age: 41 year old YOB: 1980     Date of Admission:  2022    Primary care provider: No Ref-Primary, Physician           Chief Complaint:   Hodan Iniguez is a 41 year old  at 39w2d by LMP=7 week US admitted for IOL-AMA and polyhydramnios.          Pregnancy history:   This pregnancy has been complicated by AMA, polyhydramnios. She notes she has been feeling well today. She is present with her parents.      OBSTETRIC HISTORY:    OB History    Para Term  AB Living   1 0 0 0 0 0   SAB IAB Ectopic Multiple Live Births   0 0 0 0 0      # Outcome Date GA Lbr Rommel/2nd Weight Sex Delivery Anes PTL Lv   1 Current                PRENATAL LABS:   Lab Results   Component Value Date    AS Negative 10/12/2021    HEPBANG Nonreactive 10/12/2021    HGB 12.1 2022         MEDICATIONS:  Medications Prior to Admission   Medication Sig Dispense Refill Last Dose     ferrous sulfate (FEROSUL) 325 (65 Fe) MG tablet Take 1 tablet (325 mg) by mouth daily (with breakfast) 60 tablet 1      levothyroxine (SYNTHROID/LEVOTHROID) 75 MCG tablet Take 2 tablets (150 mcg) by mouth daily 180 tablet 1      metFORMIN (GLUCOPHAGE) 500 MG tablet TAKE 1 TABLET(500 MG) BY MOUTH BID with meals 180 tablet 0      Prenatal Vit-Fe Fumarate-FA (PRENATAL MULTIVITAMIN W/IRON) 27-0.8 MG tablet Take 1 tablet by mouth daily 90 tablet 3      sertraline (ZOLOFT) 100 MG tablet Take 1.5 tablets (150 mg) by mouth daily 90 tablet 1    .        Maternal Past Medical History:     Past Medical History:   Diagnosis Date     Anxiety disorder     No Comments Provided     Depressive disorder      Gastro-esophageal reflux disease without esophagitis     No Comments Provided     Hypothyroidism     No Comments Provided     Infertility, female     Never had a full work up, thought she may have PCOS     Migraines      Varicella     as a child     She  specifically denies asthma, HTN, DM or history of VTE    SURGICAL HISTORY:  Past Surgical History:   Procedure Laterality Date     CHOLECYSTECTOMY      No Comments Provided       ALLERGIES:   No Known Allergies          Family History:     Family History   Problem Relation Age of Onset     Hypertension Mother         Hypertension     Mental Illness Mother         Mental illness,sees a therapist     Other - See Comments Mother         MTHFR     Cancer Mother         uterine/endometrial     Prostate Cancer Father         Cancer-prostate     Hypertension Father         Hypertension     Infertility Sister      Thyroid Disease Sister      Infertility Sister      Thyroid Disease Sister      Mental Illness Brother         Mental illness,anger, therapy     Cancer Paternal Grandmother 80        Cancer,lung cancer smoker          Social History:     Social History     Tobacco Use     Smoking status: Never Smoker     Smokeless tobacco: Never Used   Vaping Use     Vaping Use: Never used     Passive vaping exposure: Yes   Substance Use Topics     Alcohol use: Not Currently     Alcohol/week: 24.0 standard drinks     Types: 24 Cans of beer per week     Drug use: Never            Review of Systems:   ROS:   Skin: negative for rash, bruising  Eyes: negative for visual blurring, double vision  Ears/Nose/Throat: negative for nasal congestion, vertigo  Respiratory: No shortness of breath, dyspnea on exertion, cough, or hemoptysis  Cardiovascular: negative for palpitations, chest pain, lower extremity edema and syncope or near-syncope  Gastrointestinal: negative for, nausea, vomiting and hematemesis  Genitourinary: negative for, dysuria, frequency and urgency  Musculoskeletal: negative for, back pain and muscular weakness  Neurologic: negative for, headaches, syncope, seizures and local weakness  Psychiatric: negative for, anxiety, depression and hallucinations  Hematologic/Lymphatic/Immunologic: negative for, anemia, chills and  fever           Physical Exam:   Gen: Alert and oriented, No acute distress, well-appearing  CV: Normal heart rate to mild tachycardia with labor, regular rhythm, no audible murmur or gallop  Resp: Lungs clear to auscultation, non-labored respirations  Abd: Soft, gravid, intermittent contractions palpated, no point tenderness  Ext: No sign of asymmetric edema, erythema, or asymmetric size. No pain with movement, Negative Ciara's sign    Cervix: /-3  Membranes: intact  EFW by Leopolds: [id number]  Presentation:Cephalic    FHT: 150bpm baseline, moderate  variability, + accelerations, no decelerations (Cat 1)  [place]: 1 per 10 minutes        Assessment:   Hodan Iniguez is a 41 year old  at 39w2d admitted for IOL-polyhydramnios and AMA, doing well. This pregnancy is complicated by AMA, polyhydramnios, maternal hypothyroidism.        Plan:       # Term IUP: Labor progress  -- SVE: /-3, soft, anterior  -- [place]: <1 q10 min  -- Membranes: intact  -- Confirmed cephalic by bsus    # FWB  -- CEFM: 150 bpm baseline, moderate  variability, + accelerations, no decelerations (Cat 1)  -- EFW: 3800 gms by leopolds    # Routine Prenatal Care  -- Dating: LMP=7 week US ELEN: 2022  -- PNLs:              O Positive, AB screen neg              RPR nr              Hep B S Ag neg              Hep C neg              Rubella immune              HIV neg              GC/Chlam neg     -- Genetic Screening: [place] low risk male, SMA negative, CF negative  -- Anatomy US: MFM normal anatomy  -- Immunizations: Declines flu shot, Tdap 3/15  -- 3rd TM labs including CBC, RPR: RPR nr, Hgb 10.9  -- 1 hr GTT: 113  -- GBS: negative  -- Postpartum Planning: breastfeeding    # PMH/PSH  -- Hypothyroidism    # Obstetric history  --     # PPH risk  -- high due to polyhydramnios    # Polyhydramnios  -- JOLEEN 26.5 cm on 3/30--> 33.5--> 30.1cm-->32 cm--> 29               Declined prior recommendation for MFM recommendation  --  Originally with mild  poly (26 cm) anticipated IOL between [id number]-- similar to AMA timeline-- but this may need to be sooner based on results from [facility] recommendation     # Hypothyroidism  -- Levothyroxine now 150 mcg/day  -- Check TSH q trimester               TSH improved from 32 in 2021--> 0.81 on : 0.04, dropped levothyroxine dose to 150 mcg down from 225 mcg     # Advanced Maternal Age  -- 32 week growth US               68%ile, BPD 98% and HC 98%ile, AC 75%ile  -- 36 week growth US               93%ile, 3387 grams, BPD 90%, HC 92% AC 90%, FL 2%  --  testing with weekly BPPs at 34 weeks    # PP Planning  -- Plans to breastfeed  -- Declines contraception    # Pain  -- IV pain meds and epidural prn  -- She desires an epidural later in labor    # Plan  -- Admit to labor and delivery  -- Begin cervical ripening with cytotec 25mcg PV q 4 hours  -- SVE as clinically indicated  -- Anticipate       RODNEY WEISS MD on 2022 at 4:56 PM

## 2022-05-19 ENCOUNTER — ANESTHESIA EVENT (OUTPATIENT)
Dept: SURGERY | Facility: OTHER | Age: 42
End: 2022-05-19
Payer: COMMERCIAL

## 2022-05-19 ENCOUNTER — ANESTHESIA (OUTPATIENT)
Dept: SURGERY | Facility: OTHER | Age: 42
End: 2022-05-19
Payer: COMMERCIAL

## 2022-05-19 LAB
GLUCOSE BLDC GLUCOMTR-MCNC: 78 MG/DL (ref 70–99)
T PALLIDUM AB SER QL: NONREACTIVE

## 2022-05-19 PROCEDURE — 710N000010 HC RECOVERY PHASE 1, LEVEL 2, PER MIN: Performed by: OBSTETRICS & GYNECOLOGY

## 2022-05-19 PROCEDURE — 59510 CESAREAN DELIVERY: CPT | Performed by: NURSE ANESTHETIST, CERTIFIED REGISTERED

## 2022-05-19 PROCEDURE — 64488 TAP BLOCK BI INJECTION: CPT | Mod: XU | Performed by: NURSE ANESTHETIST, CERTIFIED REGISTERED

## 2022-05-19 PROCEDURE — 250N000013 HC RX MED GY IP 250 OP 250 PS 637: Performed by: OBSTETRICS & GYNECOLOGY

## 2022-05-19 PROCEDURE — 370N000017 HC ANESTHESIA TECHNICAL FEE, PER MIN: Performed by: OBSTETRICS & GYNECOLOGY

## 2022-05-19 PROCEDURE — 59510 CESAREAN DELIVERY: CPT | Performed by: OBSTETRICS & GYNECOLOGY

## 2022-05-19 PROCEDURE — 250N000011 HC RX IP 250 OP 636: Performed by: NURSE ANESTHETIST, CERTIFIED REGISTERED

## 2022-05-19 PROCEDURE — 250N000011 HC RX IP 250 OP 636: Performed by: OBSTETRICS & GYNECOLOGY

## 2022-05-19 PROCEDURE — 120N000001 HC R&B MED SURG/OB

## 2022-05-19 PROCEDURE — 258N000003 HC RX IP 258 OP 636: Performed by: OBSTETRICS & GYNECOLOGY

## 2022-05-19 PROCEDURE — 360N000076 HC SURGERY LEVEL 3, PER MIN: Performed by: OBSTETRICS & GYNECOLOGY

## 2022-05-19 PROCEDURE — 250N000009 HC RX 250: Performed by: NURSE ANESTHETIST, CERTIFIED REGISTERED

## 2022-05-19 PROCEDURE — 258N000003 HC RX IP 258 OP 636: Performed by: STUDENT IN AN ORGANIZED HEALTH CARE EDUCATION/TRAINING PROGRAM

## 2022-05-19 PROCEDURE — 250N000025 HC SEVOFLURANE, PER MIN: Performed by: OBSTETRICS & GYNECOLOGY

## 2022-05-19 PROCEDURE — 272N000001 HC OR GENERAL SUPPLY STERILE: Performed by: OBSTETRICS & GYNECOLOGY

## 2022-05-19 PROCEDURE — 258N000003 HC RX IP 258 OP 636: Performed by: NURSE ANESTHETIST, CERTIFIED REGISTERED

## 2022-05-19 RX ORDER — OXYTOCIN 10 [USP'U]/ML
10 INJECTION, SOLUTION INTRAMUSCULAR; INTRAVENOUS
Status: DISCONTINUED | OUTPATIENT
Start: 2022-05-19 | End: 2022-05-19

## 2022-05-19 RX ORDER — MISOPROSTOL 100 UG/1
400 TABLET ORAL
Status: DISCONTINUED | OUTPATIENT
Start: 2022-05-19 | End: 2022-05-19 | Stop reason: HOSPADM

## 2022-05-19 RX ORDER — LEVOTHYROXINE SODIUM 150 UG/1
150 TABLET ORAL DAILY
Status: DISCONTINUED | OUTPATIENT
Start: 2022-05-19 | End: 2022-05-21 | Stop reason: HOSPADM

## 2022-05-19 RX ORDER — CITRIC ACID/SODIUM CITRATE 334-500MG
30 SOLUTION, ORAL ORAL
Status: DISCONTINUED | OUTPATIENT
Start: 2022-05-19 | End: 2022-05-19 | Stop reason: HOSPADM

## 2022-05-19 RX ORDER — AMOXICILLIN 250 MG
1 CAPSULE ORAL 2 TIMES DAILY
Status: DISCONTINUED | OUTPATIENT
Start: 2022-05-19 | End: 2022-05-21 | Stop reason: HOSPADM

## 2022-05-19 RX ORDER — AMOXICILLIN 250 MG
2 CAPSULE ORAL 2 TIMES DAILY
Status: DISCONTINUED | OUTPATIENT
Start: 2022-05-19 | End: 2022-05-21 | Stop reason: HOSPADM

## 2022-05-19 RX ORDER — MISOPROSTOL 100 UG/1
400 TABLET ORAL
Status: DISCONTINUED | OUTPATIENT
Start: 2022-05-19 | End: 2022-05-21 | Stop reason: HOSPADM

## 2022-05-19 RX ORDER — TRANEXAMIC ACID 10 MG/ML
1 INJECTION, SOLUTION INTRAVENOUS EVERY 30 MIN PRN
Status: DISCONTINUED | OUTPATIENT
Start: 2022-05-19 | End: 2022-05-19 | Stop reason: HOSPADM

## 2022-05-19 RX ORDER — KETOROLAC TROMETHAMINE 30 MG/ML
INJECTION, SOLUTION INTRAMUSCULAR; INTRAVENOUS PRN
Status: DISCONTINUED | OUTPATIENT
Start: 2022-05-19 | End: 2022-05-19

## 2022-05-19 RX ORDER — PROCHLORPERAZINE 25 MG
25 SUPPOSITORY, RECTAL RECTAL EVERY 12 HOURS PRN
Status: DISCONTINUED | OUTPATIENT
Start: 2022-05-19 | End: 2022-05-21 | Stop reason: HOSPADM

## 2022-05-19 RX ORDER — OXYTOCIN/0.9 % SODIUM CHLORIDE 30/500 ML
100-340 PLASTIC BAG, INJECTION (ML) INTRAVENOUS CONTINUOUS PRN
Status: DISCONTINUED | OUTPATIENT
Start: 2022-05-19 | End: 2022-05-19

## 2022-05-19 RX ORDER — HYDROCORTISONE 25 MG/G
CREAM TOPICAL 3 TIMES DAILY PRN
Status: DISCONTINUED | OUTPATIENT
Start: 2022-05-19 | End: 2022-05-21 | Stop reason: HOSPADM

## 2022-05-19 RX ORDER — METOCLOPRAMIDE 10 MG/1
10 TABLET ORAL EVERY 6 HOURS PRN
Status: DISCONTINUED | OUTPATIENT
Start: 2022-05-19 | End: 2022-05-21 | Stop reason: HOSPADM

## 2022-05-19 RX ORDER — OXYCODONE HYDROCHLORIDE 5 MG/1
5 TABLET ORAL EVERY 4 HOURS PRN
Status: DISCONTINUED | OUTPATIENT
Start: 2022-05-19 | End: 2022-05-21 | Stop reason: HOSPADM

## 2022-05-19 RX ORDER — METHYLERGONOVINE MALEATE 0.2 MG/ML
200 INJECTION INTRAVENOUS
Status: DISCONTINUED | OUTPATIENT
Start: 2022-05-19 | End: 2022-05-21 | Stop reason: HOSPADM

## 2022-05-19 RX ORDER — SIMETHICONE 80 MG
80 TABLET,CHEWABLE ORAL 4 TIMES DAILY PRN
Status: DISCONTINUED | OUTPATIENT
Start: 2022-05-19 | End: 2022-05-21 | Stop reason: HOSPADM

## 2022-05-19 RX ORDER — MODIFIED LANOLIN
OINTMENT (GRAM) TOPICAL
Status: DISCONTINUED | OUTPATIENT
Start: 2022-05-19 | End: 2022-05-21 | Stop reason: HOSPADM

## 2022-05-19 RX ORDER — DEXMEDETOMIDINE HYDROCHLORIDE 4 UG/ML
INJECTION, SOLUTION INTRAVENOUS PRN
Status: DISCONTINUED | OUTPATIENT
Start: 2022-05-19 | End: 2022-05-19

## 2022-05-19 RX ORDER — SODIUM CHLORIDE, SODIUM LACTATE, POTASSIUM CHLORIDE, CALCIUM CHLORIDE 600; 310; 30; 20 MG/100ML; MG/100ML; MG/100ML; MG/100ML
INJECTION, SOLUTION INTRAVENOUS CONTINUOUS PRN
Status: DISCONTINUED | OUTPATIENT
Start: 2022-05-19 | End: 2022-05-19

## 2022-05-19 RX ORDER — AZITHROMYCIN 500 MG/5ML
500 INJECTION, POWDER, LYOPHILIZED, FOR SOLUTION INTRAVENOUS
Status: COMPLETED | OUTPATIENT
Start: 2022-05-19 | End: 2022-05-19

## 2022-05-19 RX ORDER — OXYTOCIN/0.9 % SODIUM CHLORIDE 30/500 ML
340 PLASTIC BAG, INJECTION (ML) INTRAVENOUS CONTINUOUS PRN
Status: DISCONTINUED | OUTPATIENT
Start: 2022-05-19 | End: 2022-05-21 | Stop reason: HOSPADM

## 2022-05-19 RX ORDER — METHYLERGONOVINE MALEATE 0.2 MG/ML
200 INJECTION INTRAVENOUS
Status: DISCONTINUED | OUTPATIENT
Start: 2022-05-19 | End: 2022-05-19 | Stop reason: HOSPADM

## 2022-05-19 RX ORDER — ONDANSETRON 2 MG/ML
INJECTION INTRAMUSCULAR; INTRAVENOUS PRN
Status: DISCONTINUED | OUTPATIENT
Start: 2022-05-19 | End: 2022-05-19

## 2022-05-19 RX ORDER — ACETAMINOPHEN 325 MG/1
975 TABLET ORAL EVERY 6 HOURS
Status: DISCONTINUED | OUTPATIENT
Start: 2022-05-19 | End: 2022-05-21 | Stop reason: HOSPADM

## 2022-05-19 RX ORDER — OXYTOCIN 10 [USP'U]/ML
10 INJECTION, SOLUTION INTRAMUSCULAR; INTRAVENOUS
Status: DISCONTINUED | OUTPATIENT
Start: 2022-05-19 | End: 2022-05-21 | Stop reason: HOSPADM

## 2022-05-19 RX ORDER — KETOROLAC TROMETHAMINE 30 MG/ML
30 INJECTION, SOLUTION INTRAMUSCULAR; INTRAVENOUS EVERY 6 HOURS
Status: COMPLETED | OUTPATIENT
Start: 2022-05-19 | End: 2022-05-20

## 2022-05-19 RX ORDER — CEFAZOLIN SODIUM/WATER 2 G/20 ML
2 SYRINGE (ML) INTRAVENOUS
Status: DISCONTINUED | OUTPATIENT
Start: 2022-05-19 | End: 2022-05-19 | Stop reason: HOSPADM

## 2022-05-19 RX ORDER — IBUPROFEN 400 MG/1
800 TABLET, FILM COATED ORAL EVERY 6 HOURS
Status: DISCONTINUED | OUTPATIENT
Start: 2022-05-20 | End: 2022-05-21 | Stop reason: HOSPADM

## 2022-05-19 RX ORDER — KETOROLAC TROMETHAMINE 30 MG/ML
30 INJECTION, SOLUTION INTRAMUSCULAR; INTRAVENOUS
Status: DISCONTINUED | OUTPATIENT
Start: 2022-05-19 | End: 2022-05-19

## 2022-05-19 RX ORDER — CEFAZOLIN SODIUM/WATER 2 G/20 ML
2 SYRINGE (ML) INTRAVENOUS SEE ADMIN INSTRUCTIONS
Status: DISCONTINUED | OUTPATIENT
Start: 2022-05-19 | End: 2022-05-19 | Stop reason: HOSPADM

## 2022-05-19 RX ORDER — BISACODYL 10 MG
10 SUPPOSITORY, RECTAL RECTAL DAILY PRN
Status: DISCONTINUED | OUTPATIENT
Start: 2022-05-21 | End: 2022-05-21 | Stop reason: HOSPADM

## 2022-05-19 RX ORDER — ONDANSETRON 4 MG/1
4 TABLET, ORALLY DISINTEGRATING ORAL EVERY 6 HOURS PRN
Status: DISCONTINUED | OUTPATIENT
Start: 2022-05-19 | End: 2022-05-21 | Stop reason: HOSPADM

## 2022-05-19 RX ORDER — CARBOPROST TROMETHAMINE 250 UG/ML
250 INJECTION, SOLUTION INTRAMUSCULAR
Status: DISCONTINUED | OUTPATIENT
Start: 2022-05-19 | End: 2022-05-21 | Stop reason: HOSPADM

## 2022-05-19 RX ORDER — LIDOCAINE 40 MG/G
CREAM TOPICAL
Status: DISCONTINUED | OUTPATIENT
Start: 2022-05-19 | End: 2022-05-19 | Stop reason: HOSPADM

## 2022-05-19 RX ORDER — DEXAMETHASONE SODIUM PHOSPHATE 4 MG/ML
INJECTION, SOLUTION INTRA-ARTICULAR; INTRALESIONAL; INTRAMUSCULAR; INTRAVENOUS; SOFT TISSUE PRN
Status: DISCONTINUED | OUTPATIENT
Start: 2022-05-19 | End: 2022-05-19

## 2022-05-19 RX ORDER — SODIUM CHLORIDE, SODIUM LACTATE, POTASSIUM CHLORIDE, CALCIUM CHLORIDE 600; 310; 30; 20 MG/100ML; MG/100ML; MG/100ML; MG/100ML
INJECTION, SOLUTION INTRAVENOUS CONTINUOUS
Status: DISCONTINUED | OUTPATIENT
Start: 2022-05-19 | End: 2022-05-19 | Stop reason: HOSPADM

## 2022-05-19 RX ORDER — CEFAZOLIN SODIUM 2 G/100ML
INJECTION, SOLUTION INTRAVENOUS PRN
Status: DISCONTINUED | OUTPATIENT
Start: 2022-05-19 | End: 2022-05-19

## 2022-05-19 RX ORDER — LIDOCAINE HYDROCHLORIDE 10 MG/ML
INJECTION, SOLUTION INFILTRATION; PERINEURAL PRN
Status: DISCONTINUED | OUTPATIENT
Start: 2022-05-19 | End: 2022-05-19

## 2022-05-19 RX ORDER — OXYTOCIN/0.9 % SODIUM CHLORIDE 30/500 ML
340 PLASTIC BAG, INJECTION (ML) INTRAVENOUS CONTINUOUS PRN
Status: DISCONTINUED | OUTPATIENT
Start: 2022-05-19 | End: 2022-05-19 | Stop reason: HOSPADM

## 2022-05-19 RX ORDER — OXYTOCIN 10 [USP'U]/ML
10 INJECTION, SOLUTION INTRAMUSCULAR; INTRAVENOUS
Status: DISCONTINUED | OUTPATIENT
Start: 2022-05-19 | End: 2022-05-19 | Stop reason: HOSPADM

## 2022-05-19 RX ORDER — NALOXONE HYDROCHLORIDE 0.4 MG/ML
0.4 INJECTION, SOLUTION INTRAMUSCULAR; INTRAVENOUS; SUBCUTANEOUS
Status: DISCONTINUED | OUTPATIENT
Start: 2022-05-19 | End: 2022-05-21 | Stop reason: HOSPADM

## 2022-05-19 RX ORDER — NALOXONE HYDROCHLORIDE 0.4 MG/ML
0.2 INJECTION, SOLUTION INTRAMUSCULAR; INTRAVENOUS; SUBCUTANEOUS
Status: DISCONTINUED | OUTPATIENT
Start: 2022-05-19 | End: 2022-05-21 | Stop reason: HOSPADM

## 2022-05-19 RX ORDER — BUPIVACAINE HYDROCHLORIDE 7.5 MG/ML
INJECTION, SOLUTION INTRASPINAL PRN
Status: DISCONTINUED | OUTPATIENT
Start: 2022-05-19 | End: 2022-05-19

## 2022-05-19 RX ORDER — ONDANSETRON 2 MG/ML
4 INJECTION INTRAMUSCULAR; INTRAVENOUS EVERY 6 HOURS PRN
Status: DISCONTINUED | OUTPATIENT
Start: 2022-05-19 | End: 2022-05-21 | Stop reason: HOSPADM

## 2022-05-19 RX ORDER — TRANEXAMIC ACID 10 MG/ML
1 INJECTION, SOLUTION INTRAVENOUS EVERY 30 MIN PRN
Status: DISCONTINUED | OUTPATIENT
Start: 2022-05-19 | End: 2022-05-21 | Stop reason: HOSPADM

## 2022-05-19 RX ORDER — METOCLOPRAMIDE HYDROCHLORIDE 5 MG/ML
10 INJECTION INTRAMUSCULAR; INTRAVENOUS EVERY 6 HOURS PRN
Status: DISCONTINUED | OUTPATIENT
Start: 2022-05-19 | End: 2022-05-21 | Stop reason: HOSPADM

## 2022-05-19 RX ORDER — PROCHLORPERAZINE MALEATE 10 MG
10 TABLET ORAL EVERY 6 HOURS PRN
Status: DISCONTINUED | OUTPATIENT
Start: 2022-05-19 | End: 2022-05-21 | Stop reason: HOSPADM

## 2022-05-19 RX ORDER — OXYTOCIN/0.9 % SODIUM CHLORIDE 30/500 ML
PLASTIC BAG, INJECTION (ML) INTRAVENOUS CONTINUOUS PRN
Status: DISCONTINUED | OUTPATIENT
Start: 2022-05-19 | End: 2022-05-19

## 2022-05-19 RX ORDER — ACETAMINOPHEN 325 MG/1
975 TABLET ORAL ONCE
Status: DISCONTINUED | OUTPATIENT
Start: 2022-05-19 | End: 2022-05-19 | Stop reason: HOSPADM

## 2022-05-19 RX ORDER — CARBOPROST TROMETHAMINE 250 UG/ML
250 INJECTION, SOLUTION INTRAMUSCULAR
Status: DISCONTINUED | OUTPATIENT
Start: 2022-05-19 | End: 2022-05-19 | Stop reason: HOSPADM

## 2022-05-19 RX ORDER — MORPHINE SULFATE 0.5 MG/ML
INJECTION, SOLUTION EPIDURAL; INTRATHECAL; INTRAVENOUS PRN
Status: DISCONTINUED | OUTPATIENT
Start: 2022-05-19 | End: 2022-05-19

## 2022-05-19 RX ORDER — DEXTROSE, SODIUM CHLORIDE, SODIUM LACTATE, POTASSIUM CHLORIDE, AND CALCIUM CHLORIDE 5; .6; .31; .03; .02 G/100ML; G/100ML; G/100ML; G/100ML; G/100ML
INJECTION, SOLUTION INTRAVENOUS CONTINUOUS
Status: DISCONTINUED | OUTPATIENT
Start: 2022-05-19 | End: 2022-05-20

## 2022-05-19 RX ORDER — FENTANYL CITRATE 50 UG/ML
INJECTION, SOLUTION INTRAMUSCULAR; INTRAVENOUS PRN
Status: DISCONTINUED | OUTPATIENT
Start: 2022-05-19 | End: 2022-05-19

## 2022-05-19 RX ORDER — LIDOCAINE 40 MG/G
CREAM TOPICAL
Status: DISCONTINUED | OUTPATIENT
Start: 2022-05-19 | End: 2022-05-21 | Stop reason: HOSPADM

## 2022-05-19 RX ORDER — BUPIVACAINE HYDROCHLORIDE 2.5 MG/ML
INJECTION, SOLUTION EPIDURAL; INFILTRATION; INTRACAUDAL PRN
Status: DISCONTINUED | OUTPATIENT
Start: 2022-05-19 | End: 2022-05-19

## 2022-05-19 RX ORDER — IBUPROFEN 400 MG/1
800 TABLET, FILM COATED ORAL
Status: DISCONTINUED | OUTPATIENT
Start: 2022-05-19 | End: 2022-05-19

## 2022-05-19 RX ADMIN — KETOROLAC TROMETHAMINE 30 MG: 30 INJECTION, SOLUTION INTRAMUSCULAR; INTRAVENOUS at 13:50

## 2022-05-19 RX ADMIN — KETOROLAC TROMETHAMINE 30 MG: 30 INJECTION, SOLUTION INTRAMUSCULAR at 06:45

## 2022-05-19 RX ADMIN — FENTANYL CITRATE 15 MCG: 50 INJECTION, SOLUTION INTRAMUSCULAR; INTRAVENOUS at 06:00

## 2022-05-19 RX ADMIN — LIDOCAINE HYDROCHLORIDE 3 ML: 10 INJECTION, SOLUTION INFILTRATION; PERINEURAL at 05:58

## 2022-05-19 RX ADMIN — PHENYLEPHRINE HYDROCHLORIDE 0.5 MCG/KG/MIN: 10 INJECTION INTRAVENOUS at 06:01

## 2022-05-19 RX ADMIN — ONDANSETRON HYDROCHLORIDE 4 MG: 2 SOLUTION INTRAMUSCULAR; INTRAVENOUS at 06:22

## 2022-05-19 RX ADMIN — SODIUM CHLORIDE, SODIUM LACTATE, POTASSIUM CHLORIDE, AND CALCIUM CHLORIDE: 600; 310; 30; 20 INJECTION, SOLUTION INTRAVENOUS at 05:51

## 2022-05-19 RX ADMIN — DEXMEDETOMIDINE HYDROCHLORIDE 25 MCG: 4 INJECTION, SOLUTION INTRAVENOUS at 06:52

## 2022-05-19 RX ADMIN — SENNOSIDES AND DOCUSATE SODIUM 1 TABLET: 50; 8.6 TABLET ORAL at 10:00

## 2022-05-19 RX ADMIN — DEXMEDETOMIDINE HYDROCHLORIDE 25 MCG: 4 INJECTION, SOLUTION INTRAVENOUS at 06:56

## 2022-05-19 RX ADMIN — DEXAMETHASONE SODIUM PHOSPHATE 4 MG: 4 INJECTION, SOLUTION INTRAMUSCULAR; INTRAVENOUS at 06:52

## 2022-05-19 RX ADMIN — BUPIVACAINE HYDROCHLORIDE 30 ML: 2.5 INJECTION, SOLUTION EPIDURAL; INFILTRATION; INTRACAUDAL; PERINEURAL at 06:56

## 2022-05-19 RX ADMIN — ACETAMINOPHEN 975 MG: 325 TABLET ORAL at 10:00

## 2022-05-19 RX ADMIN — CEFAZOLIN SODIUM 2 G: 2 INJECTION, SOLUTION INTRAVENOUS at 05:55

## 2022-05-19 RX ADMIN — BUPIVACAINE HYDROCHLORIDE 30 ML: 2.5 INJECTION, SOLUTION EPIDURAL; INFILTRATION; INTRACAUDAL; PERINEURAL at 06:52

## 2022-05-19 RX ADMIN — SERTRALINE HYDROCHLORIDE 100 MG: 50 TABLET ORAL at 10:00

## 2022-05-19 RX ADMIN — AZITHROMYCIN MONOHYDRATE 500 MG: 500 INJECTION, POWDER, LYOPHILIZED, FOR SOLUTION INTRAVENOUS at 05:33

## 2022-05-19 RX ADMIN — BUPIVACAINE HYDROCHLORIDE 1.8 ML: 7.5 INJECTION, SOLUTION INTRASPINAL at 06:00

## 2022-05-19 RX ADMIN — DEXAMETHASONE SODIUM PHOSPHATE 4 MG: 4 INJECTION, SOLUTION INTRAMUSCULAR; INTRAVENOUS at 06:56

## 2022-05-19 RX ADMIN — SODIUM CHLORIDE, POTASSIUM CHLORIDE, SODIUM LACTATE AND CALCIUM CHLORIDE 500 ML: 600; 310; 30; 20 INJECTION, SOLUTION INTRAVENOUS at 01:45

## 2022-05-19 RX ADMIN — MORPHINE SULFATE 0.1 MG: 0.5 INJECTION, SOLUTION EPIDURAL; INTRATHECAL; INTRAVENOUS at 06:00

## 2022-05-19 RX ADMIN — ACETAMINOPHEN 975 MG: 325 TABLET ORAL at 15:49

## 2022-05-19 RX ADMIN — LEVOTHYROXINE SODIUM 150 MCG: 150 TABLET ORAL at 10:02

## 2022-05-19 RX ADMIN — SENNOSIDES AND DOCUSATE SODIUM 1 TABLET: 50; 8.6 TABLET ORAL at 21:03

## 2022-05-19 RX ADMIN — SODIUM CHLORIDE, SODIUM LACTATE, POTASSIUM CHLORIDE, CALCIUM CHLORIDE AND DEXTROSE MONOHYDRATE: 5; 600; 310; 30; 20 INJECTION, SOLUTION INTRAVENOUS at 10:01

## 2022-05-19 RX ADMIN — KETOROLAC TROMETHAMINE 30 MG: 30 INJECTION, SOLUTION INTRAMUSCULAR; INTRAVENOUS at 21:01

## 2022-05-19 RX ADMIN — Medication 300 ML/HR: at 06:20

## 2022-05-19 ASSESSMENT — ACTIVITIES OF DAILY LIVING (ADL): ADLS_ACUITY_SCORE: 18

## 2022-05-19 NOTE — ANESTHESIA PROCEDURE NOTES
Intrathecal injection Procedure Note    Pre-Procedure   Staff -        CRNA: Rubén Stewart APRN CRNA       Performed By: CRNA       Location: OR       Procedure Start/Stop Times: 2022 5:07 AM and 2022 6:00 AM       Pre-Anesthestic Checklist: patient identified, IV checked, risks and benefits discussed, informed consent, monitors and equipment checked, pre-op evaluation, at physician/surgeon's request and post-op pain management  Timeout:       Correct Patient: Yes        Correct Procedure: Yes        Correct Site: Yes        Correct Position: Yes   Procedure Documentation  Procedure: intrathecal injection       Diagnosis:        Patient Position: sitting       Patient Prep/Sterile Barriers: sterile gloves, mask       Skin prep: Chloraprep       Insertion Site: L3-4. (midline approach).       Needle Gauge: 25.        Needle Length (Inches): 3.5        Spinal Needle Type: Quincke       Introducer used       Introducer: 20 G       # of attempts: 1 and  # of redirects:  0    Assessment/Narrative         Paresthesias: No.       CSF fluid: clear.       Opening pressure was cmH2O while  Sitting.      Medication(s) Administered   Medication Administration Time: 2022 5:07 AM

## 2022-05-19 NOTE — OP NOTE
[facility]  Obstetrics Service Operative Report    Surgery Date:  May 19, 2022  Surgeon(s): Paulette Arrieta MD   Assistants: Diana Kowalski MD [facility]    Preoperative Diagnoses:  1.  Intrauterine pregnancy at 39w3d  2.  Category II FHT remote from delivery    Postoperative diagnoses: Same     Procedure performed:  Primary low segment transverse  section via Pfannenstiel incision with double layer uterine closure    Anesthesia:  Spinal   Quant Blood Loss (mL): 747 mL  Specimens: None  Complications: None      Operative findings: Single viable male infant at 0618 hours on 2022. Apgars of 7 and 8 at one and five minutes.  Birth weight (GM): pending.  Fetal presentation: cephalic.  Position: ROT  Amniotic fluid: light meconium.   Placenta intact with 3 vessel cord. 2700 mL amniotic fluid in the OR, after SROM  Normal appearing uterus, fallopian tubes, ovaries.  No intraabdominal adhesions.  No abdominal wall adhesions      Indication: Hodan Iniguez is a 41 year old, , who was admitted at 39w3d by LMP c/w 7w0d ultrasound for IOL for AMA and polyhydramnios.  Patient has been receiving misoprostol PV for cervical ripening overnight. Prior to midnight, FHT demonstrated intermittent late decelerations but with moderate variability and reactive. After midnight, the late decelerations became more persistent and variability decreased to minimal, with rising baseline from 150 to 165. Intrauterine resuscitation was initiated with improvement in variability to moderate and return of accelerations. After SROM at 0409, the late decelerations again became repetitive and did not respond to intrauterine resuscitation. Reviewed the FHT concerns throughout the night with the patient and recommend  section due to cat II FHT remote from delivery. Discussed r/b in detail, including risk of bleeding, infection, and injury to surrounding organs and patient agrees to proceed. OR crew called for ASAP   section.     Procedure details:  After obtaining informed consent, the patient was taken to the operating room. She received 2g Ancef prior to the skin incision. She was placed in the dorsal supine position with a leftward tilt and prepped and draped in the usual sterile fashion. Following test of adequate spinal anesthesia, the abdomen was entered through a transverse skin incision. The skin incision was made sharply and carried through the subcutaneous tissue to the fascia.  Fascia was incised in the midline and extended laterally with the Sosa scissors.  The superior margin of the fascial incision was grasped with Kocher clamps and dissected from the underlying muscle sharp and blunt dissecton, which was then repeated at the lower margin of the fascial incision.  The muscle was  in the midline.  The peritoneum was entered bluntly and the opening extended by digital dissection with care to avoid the bladder.  An Darren O retractor was placed. The vesicouterine peritoneum was entered sharply with Metzenbaum scissors and incision extended laterally. The bladder flap was created digitally. The lower segment of the uterus was opened sharply in a transverse fashion and extended with digital pressure. The infant's head was elevated to the level of the hysterotomy and was delivered atraumatically. The cord was doubly clamped and cut and the infant was handed off Dr Kowalski. The placenta was expressed.  The uterus was exteriorized from the abdomen and cleared of all clots and debris.  The uterus was massaged and was noted to be firm.  Oxytocin was given through the running IV.  With massage as well as administration of oxytocin, good uterine tone was achieved. The hysterotomy was repaired with 0-vicryl suture in a running locked fashion. A 2nd layer of 0-monocryl was used to imbricate the incision and good hemostasis was achieved. The bladder flap was inspected and found to be hemostatic.      The  posterior cul-de-sac was suctioned and the uterus was returned to the abdomen.  The bilateral pericolic gutters were suctioned.  The hysterotomy was again inspected and found to have no active sites of bleeding.  The abdominal wall was examined and found to have no active sites of bleeding.  The fascia was closed with a running suture of 0-vicryl.  Subcutaneous tissue was irrigated. Areas that were oozing were controlled with cautery. The subcutaneous tissue was reapproximated with 3-0 plain gut. The skin was closed with 4-0 vicryl. The patient tolerated the procedure well and was taken to the recovery room in stable condition. All sponge, needle and instrument counts were correct x2.     Assistant: Dr Kowalski was requested to be present for this  section due to non-reassuring fetal status, polyhydramnios, potential need for  resuscitation.    Paulette Arrieta  Ob/Gyn   2022 7:00 AM

## 2022-05-19 NOTE — OR NURSING
The patient received 76 mL of Pitocin 30 units in 500 mL of 0.9% Normal Saline during the PACU phase.    PACU Transfer Note    Hodan Iniguez was transferred to [facility] via bed.  Equipment used for transport:  none.  Accompanied by:  RN & CRNA  Prescriptions were: N/A    PACU Respiratory Event Documentation     1) Episodes of Apnea greater than or equal to 10 seconds: none    2) Bradypnea - less than 8 breaths per minute: none    3) Pain score on 0 to 10 scale: 0    4) Pain-sedation mismatch (yes or no): no    5) Repeated 02 desaturation less than 90% (yes or no): no    Anesthesia notified? (yes or no): N/A    Any of the above events occuring repeatedly in separate 30 minute intervals may be considered recurrent PACU respiratory events.    Patient stable and meets phase 1 discharge criteria for transport from PACU.    Report given to HERNAN Castillo RN on 5/19/2022 at 7:53 AM

## 2022-05-19 NOTE — ANESTHESIA PREPROCEDURE EVALUATION
Anesthesia Pre-Procedure Evaluation    Patient: Hodan Iniguez   MRN: [id number] : 1980        Procedure : Procedure(s):   SECTION          Past Medical History:   Diagnosis Date     Anxiety disorder     No Comments Provided     Depressive disorder      Gastro-esophageal reflux disease without esophagitis     No Comments Provided     Hypothyroidism     No Comments Provided     Infertility, female     Never had a full work up, thought she may have PCOS     Migraines      Varicella     as a child      Past Surgical History:   Procedure Laterality Date     CHOLECYSTECTOMY      No Comments Provided      No Known Allergies   Social History     Tobacco Use     Smoking status: Never Smoker     Smokeless tobacco: Never Used   Substance Use Topics     Alcohol use: Not Currently     Alcohol/week: 24.0 standard drinks     Types: 24 Cans of beer per week      Wt Readings from Last 1 Encounters:   22 102.5 kg (226 lb)        Anesthesia Evaluation   Pt has had prior anesthetic.     No history of anesthetic complications       ROS/MED HX  ENT/Pulmonary:  - neg pulmonary ROS     Neurologic:  - neg neurologic ROS     Cardiovascular:  - neg cardiovascular ROS     METS/Exercise Tolerance: >4 METS    Hematologic:  - neg hematologic  ROS     Musculoskeletal:  - neg musculoskeletal ROS     GI/Hepatic:     (+) GERD,     Renal/Genitourinary:  - neg Renal ROS     Endo:     (+) thyroid problem,     Psychiatric/Substance Use:     (+) psychiatric history depression     Infectious Disease:  - neg infectious disease ROS     Malignancy:  - neg malignancy ROS     Other:      (+) Possibly pregnant, ,         Physical Exam    Airway        Mallampati: II   TM distance: > 3 FB   Neck ROM: full   Mouth opening: > 3 cm    Respiratory Devices and Support         Dental  no notable dental history         Cardiovascular   cardiovascular exam normal       Rhythm and rate: regular and normal     Pulmonary   pulmonary exam normal         breath sounds clear to auscultation           OUTSIDE LABS:  CBC:   Lab Results   Component Value Date    WBC 13.9 (H) 05/18/2022    WBC 14.6 (H) 05/06/2022    HGB 12.6 05/18/2022    HGB 12.1 05/06/2022    HCT 38.0 05/18/2022    HCT 36.1 05/06/2022     05/18/2022     05/06/2022     BMP:   Lab Results   Component Value Date     04/08/2021     05/06/2019    POTASSIUM 3.8 04/08/2021    POTASSIUM 3.4 (L) 05/06/2019    CHLORIDE 107 04/08/2021    CHLORIDE 106 05/06/2019    CO2 24 04/08/2021    CO2 27 05/06/2019    BUN 20 04/08/2021    BUN 20 05/06/2019    CR 0.81 05/06/2022    CR 0.72 04/08/2021    GLC 78 05/19/2022     04/08/2021     COAGS: No results found for: PTT, INR, FIBR  POC:   Lab Results   Component Value Date    HCG Positive (A) 09/11/2021     HEPATIC:   Lab Results   Component Value Date    ALBUMIN 3.2 (L) 05/06/2022    PROTTOTAL 6.1 (L) 05/06/2022    ALT 9 05/06/2022    AST 14 05/06/2022    ALKPHOS 115 (H) 05/06/2022    BILITOTAL 0.2 (L) 05/06/2022     OTHER:   Lab Results   Component Value Date    A1C 5.4 04/08/2021    SARATH 9.0 04/08/2021    TSH 0.04 (L) 04/19/2022    T4 0.74 12/09/2021    T3 111 10/12/2021       Anesthesia Plan    ASA Status:  2   NPO Status:  NPO Appropriate    Anesthesia Type: Spinal.   Induction: Intravenous.   Maintenance: Balanced.        Consents    Anesthesia Plan(s) and associated risks, benefits, and realistic alternatives discussed. Questions answered and patient/representative(s) expressed understanding.     - Discussed: Risks, Benefits and Alternatives for BOTH SEDATION and the PROCEDURE were discussed     - Discussed with:  Patient, Spouse         Postoperative Care    Pain management: IV analgesics, Neuraxial analgesia, Peripheral nerve block (Single Shot), Multi-modal analgesia.   PONV prophylaxis: Ondansetron (or other 5HT-3), Dexamethasone or Solumedrol     Comments:                JOSE JUAN Shnae CRNA

## 2022-05-19 NOTE — ANESTHESIA CARE TRANSFER NOTE
Patient: Hodan Iniguez    Procedure: Procedure(s):   SECTION       Diagnosis: * No pre-op diagnosis entered *  Diagnosis Additional Information: No value filed.    Anesthesia Type:   Spinal     Note:    Oropharynx: oropharynx clear of all foreign objects  Level of Consciousness: awake  Oxygen Supplementation: room air    Independent Airway: airway patency satisfactory and stable  Dentition: dentition unchanged  Vital Signs Stable: post-procedure vital signs reviewed and stable  Report to RN Given: handoff report given  Patient transferred to: Labor and Delivery    Handoff Report: Identifed the Patient, Identified the Reponsible Provider, Reviewed the pertinent medical history, Discussed the surgical course, Reviewed Intra-OP anesthesia mangement and issues during anesthesia, Set expectations for post-procedure period and Allowed opportunity for questions and acknowledgement of understanding      Vitals:  Vitals Value Taken Time   BP     Temp     Pulse     Resp     SpO2         Electronically Signed By: JOSE JUAN Shane CRNA  May 19, 2022  7:00 AM

## 2022-05-19 NOTE — PROGRESS NOTES
OB Progress Note    Patient has been receiving misoprostol PV for cervical ripening overnight. Prior to midnight, FHT demonstrated intermittent late decelerations but with moderate variability and reactive. After midnight, the late decelerations became more persistent and variability decreased to minimal, with rising baseline from 150 to 165. Intrauterine resuscitation was initiated with improvement in variability to moderate and return of accelerations. After SROM at 0409, the late decelerations again became repetitive and did not respond to intrauterine resuscitation. Reviewed the FHT concerns throughout the night with the patient and recommend  section due to cat II FHT remote from delivery. Discussed r/b in detail, including risk of bleeding, infection, and injury to surrounding organs and patient agrees to proceed. OR crew called for ASAP  section    Paulette Arrieta MD FACOG  OB/GYN  2022 5:21 AM

## 2022-05-19 NOTE — PROGRESS NOTES
C/S Delivery Note  Delivered a singe viable Male via primary  section by Dr. Arrieta.    Baby brought to the warmer, stimulated and dried by Vanna Schaffer RN.    Dr. Kowalski present at delivery to care for baby.   Apgars 7/8  ID bands placed on baby, mother and father. Measurements and vital signs taken while in warmer. Brought to mother and placed skin-to-skin with RN in attendance.

## 2022-05-19 NOTE — PROGRESS NOTES
Dr. Arrieta ordered an ASAP c section for fetal intolerance. House supervisor notified. Dr. Kowalski notified.

## 2022-05-19 NOTE — ANESTHESIA POSTPROCEDURE EVALUATION
Patient: Hodan Iniguez    Procedure: Procedure(s):   SECTION       Anesthesia Type:  Spinal    Note:  Disposition: Inpatient   Postop Pain Control: Uneventful            Sign Out: Well controlled pain   PONV: No   Neuro/Psych: Uneventful            Sign Out: Acceptable/Baseline neuro status   Airway/Respiratory: Uneventful            Sign Out: Acceptable/Baseline resp. status   CV/Hemodynamics: Uneventful            Sign Out: Acceptable CV status; No obvious hypovolemia; No obvious fluid overload   Other NRE: NONE   DID A NON-ROUTINE EVENT OCCUR? No           Last vitals:  Vitals Value Taken Time   BP 92/53 22 0745   Temp 97.1  F (36.2  C) 22 0745   Pulse 69 22 0746   Resp 12 22 0747   SpO2 96 % 22 0747   Vitals shown include unvalidated device data.    Electronically Signed By: JOSE JUAN TEJEDA CRNA  May 19, 2022  7:56 AM

## 2022-05-19 NOTE — ANESTHESIA PROCEDURE NOTES
TAP Procedure Note    Pre-Procedure   Staff -        CRNA: Rubén Stewart APRN CRNA       Performed By: CRNA       Location: OR       Procedure Start/Stop Times: 2022 6:49 AM and 2022 6:56 AM       Pre-Anesthestic Checklist: patient identified, IV checked, site marked, risks and benefits discussed, informed consent, monitors and equipment checked, pre-op evaluation, at physician/surgeon's request and post-op pain management  Timeout:       Correct Patient: Yes        Correct Procedure: Yes        Correct Site: Yes        Correct Position: Yes        Correct Laterality: Yes        Site Marked: Yes  Procedure Documentation  Procedure: TAP       Diagnosis:        Laterality: bilateral       Patient Position: supine       Patient Prep/Sterile Barriers: sterile gloves, mask       Skin prep: Chloraprep       Needle Type: short bevel       Needle Gauge: 20.        Needle Length (Inches): 6        Ultrasound guided       1. Ultrasound was used to identify targeted nerve, plexus, vascular marker, or fascial plane and place a needle adjacent to it in real-time.       2. Ultrasound was used to visualize the spread of anesthetic in close proximity to the above referenced structure.       3. A permanent image is entered into the patient's record.       4. The visualized anatomic structures appeared normal.       5. There were no apparent abnormal pathologic findings.    Assessment/Narrative         The placement was negative for: blood aspirated, painful injection and site bleeding       Paresthesias: No.       Test dose of mL at.         Test dose negative, 3 minutes after injection, for signs of intravascular, subdural, or intrathecal injection.       Bolus given via needle. no blood aspirated via catheter.        Secured via.        Insertion/Infusion Method: Single Shot       Complications: none    Medication(s) Administered   Medication Administration Time: 2022 6:49 AM

## 2022-05-20 LAB
HGB BLD-MCNC: 8.8 G/DL (ref 11.7–15.7)
HGB BLD-MCNC: 9.1 G/DL (ref 11.7–15.7)

## 2022-05-20 PROCEDURE — 85018 HEMOGLOBIN: CPT | Performed by: OBSTETRICS & GYNECOLOGY

## 2022-05-20 PROCEDURE — 120N000001 HC R&B MED SURG/OB

## 2022-05-20 PROCEDURE — 250N000013 HC RX MED GY IP 250 OP 250 PS 637: Performed by: OBSTETRICS & GYNECOLOGY

## 2022-05-20 PROCEDURE — 36415 COLL VENOUS BLD VENIPUNCTURE: CPT | Performed by: OBSTETRICS & GYNECOLOGY

## 2022-05-20 PROCEDURE — 250N000011 HC RX IP 250 OP 636: Performed by: OBSTETRICS & GYNECOLOGY

## 2022-05-20 RX ADMIN — KETOROLAC TROMETHAMINE 30 MG: 30 INJECTION, SOLUTION INTRAMUSCULAR; INTRAVENOUS at 03:12

## 2022-05-20 RX ADMIN — IBUPROFEN 800 MG: 400 TABLET ORAL at 15:06

## 2022-05-20 RX ADMIN — OXYCODONE HYDROCHLORIDE 5 MG: 5 TABLET ORAL at 17:46

## 2022-05-20 RX ADMIN — SENNOSIDES AND DOCUSATE SODIUM 2 TABLET: 50; 8.6 TABLET ORAL at 09:48

## 2022-05-20 RX ADMIN — SERTRALINE HYDROCHLORIDE 150 MG: 50 TABLET ORAL at 09:48

## 2022-05-20 RX ADMIN — ACETAMINOPHEN 975 MG: 325 TABLET ORAL at 05:59

## 2022-05-20 RX ADMIN — ACETAMINOPHEN 975 MG: 325 TABLET ORAL at 12:25

## 2022-05-20 RX ADMIN — ACETAMINOPHEN 975 MG: 325 TABLET ORAL at 00:02

## 2022-05-20 RX ADMIN — ACETAMINOPHEN 975 MG: 325 TABLET ORAL at 17:45

## 2022-05-20 RX ADMIN — IBUPROFEN 800 MG: 400 TABLET ORAL at 09:48

## 2022-05-20 RX ADMIN — IBUPROFEN 800 MG: 400 TABLET ORAL at 21:01

## 2022-05-20 RX ADMIN — OXYCODONE HYDROCHLORIDE 5 MG: 5 TABLET ORAL at 01:58

## 2022-05-20 RX ADMIN — LEVOTHYROXINE SODIUM 150 MCG: 150 TABLET ORAL at 09:48

## 2022-05-20 RX ADMIN — OXYCODONE HYDROCHLORIDE 5 MG: 5 TABLET ORAL at 12:26

## 2022-05-20 NOTE — PLAN OF CARE
VSS. Patient doing well. Breastfeeding. Patient showered. Incision dressing removed; incision WDL. Interdry applied. Up ad abiel. Voiding spontaneously without difficulty. Fundus at umbilicus, firm, and midline. Light bleeding, no clots. Will continue to monitor and intervene appropriately.    /74 (BP Location: Left arm, Patient Position: Sitting, Cuff Size: Adult Regular)   Pulse 75   Temp 96.8  F (36  C) (Temporal)   Resp 18   LMP 08/16/2021 (Exact Date)   SpO2 98%   Breastfeeding Unknown     Cali Jim RN on 5/20/2022 at 6:37 PM

## 2022-05-20 NOTE — PROGRESS NOTES
OB/GYN Postpartum Note      S:  Patient is feeling well this morning.  Lochia = menses.  Eating and drinking without nausea.  Ambulating without difficulty or dizziness.   Pain is well controlled.   Breastfeeding without questions or concerns.      O:   Vitals:    22 1100 22 1644 22 2016 22 0125   BP: 122/72 127/71 126/78 123/71   BP Location: Right arm Right arm     Patient Position: Sitting Sitting     Cuff Size: Adult Regular Adult Regular     Pulse:       Resp:     Temp:  97  F (36.1  C) (!) 95.9  F (35.5  C) (!) 96.7  F (35.9  C)   TempSrc:  Temporal Temporal Temporal   SpO2:   96% 94%     General: resting in bed, in NAD  Resp: nonlabored  Abdomen: soft, nontender, nondistended  Fundus firm at umbilicus+1  Incision: covered in bandage, no shadowing  Extremities: 1+ edema in BLE    Hemoglobin   Date Value Ref Range Status   2022 8.8 (L) 11.7 - 15.7 g/dL Final   2020 12.6 11.7 - 15.7 g/dL Final   ]    A: Ms. Hodan Iniguez is a 41 year old  POD #1 s/p PLTCS for category II FHT remote from delivery    P:  Heme 12.6 >  > 8.8, asymptomatic acute blood loss anemia. Larger than expected Hgb drop based on QBL- will repeat Hgb at 1200. No signs of acute abdomen.   GI: tolerating regular diet  Feeding: breast  Contraception: will discuss at PP visit  Rubella Immune  Rh positive  Disposition: routine cares, anticipate discharge in 1-2 days    Paulette Arrieta MD FACOG  OB/GYN  2022 9:26 AM

## 2022-05-20 NOTE — PLAN OF CARE
Goal Outcome Evaluation:    Patient tolerating a regular diet. Assisted OOB at 1700 with SBA. Tolerated activity. No dizziness or lightheadedness. Steady while up. Pedraza catheter removed and patient is due to void. Bonding with baby and assisted with breastfeeding sessions to get a proper latch and positioning. Skin to skin with baby for breastfeeding. Lochia is light to moderate. Pain is  minimal with activity. Has scheduled Tylenol and Toradol.V.S. stable.

## 2022-05-20 NOTE — LACTATION NOTE
This note was copied from a baby's chart.  INPATIENT LACTATION CONSULT      Consult with Hodan and kamila regarding breastfeeding.  Obvious rooting with a strong latch observed this feeding session.  Rhythmic and aggressive suckling also noted.  Instructed Hodan on correct positioning and technique when latching babe on.  Hodan is independent with latching babe onto breast.  Minimal assistance required.  Encouraged Hodan on the importance of frequent feedings throughout the day (at least 8-12 feedings in a 24 hour period) and skin to skin contact.  Hodan demonstrated and states she understands all information given.    Soo Vargas RN, IBCLC  Lactation Consultant  [facility]

## 2022-05-20 NOTE — PLAN OF CARE
Pt is doing well, VSS. She is ambulating without difficulty. She is tolerating a regular diet, voiding without difficulty. Fundus is firm at 1/U, bleeding is light to moderate. She is breastfeeding baby on demand every 2-3 hours. She is becoming more comfortable and independent with breastfeeding. Dressing is CDI, saline lock has been removed. Significant other present in room, somewhat supportive. Pt is bonding well with baby.        Myself/PA

## 2022-05-21 VITALS
SYSTOLIC BLOOD PRESSURE: 122 MMHG | HEART RATE: 76 BPM | OXYGEN SATURATION: 98 % | TEMPERATURE: 98.24 F | DIASTOLIC BLOOD PRESSURE: 96 MMHG | RESPIRATION RATE: 18 BRPM

## 2022-05-21 PROCEDURE — 250N000013 HC RX MED GY IP 250 OP 250 PS 637: Performed by: OBSTETRICS & GYNECOLOGY

## 2022-05-21 RX ORDER — AMOXICILLIN 250 MG
1 CAPSULE ORAL 2 TIMES DAILY
Qty: 30 TABLET | Refills: 0 | Status: SHIPPED | OUTPATIENT
Start: 2022-05-21 | End: 2022-06-01

## 2022-05-21 RX ORDER — OXYCODONE HYDROCHLORIDE 5 MG/1
5 TABLET ORAL EVERY 4 HOURS PRN
Qty: 30 TABLET | Refills: 0 | Status: SHIPPED | OUTPATIENT
Start: 2022-05-21 | End: 2022-06-01

## 2022-05-21 RX ORDER — IBUPROFEN 800 MG/1
800 TABLET, FILM COATED ORAL EVERY 6 HOURS
Qty: 30 TABLET | Refills: 0 | Status: SHIPPED | OUTPATIENT
Start: 2022-05-21 | End: 2022-06-01

## 2022-05-21 RX ADMIN — IBUPROFEN 800 MG: 400 TABLET ORAL at 09:21

## 2022-05-21 RX ADMIN — LEVOTHYROXINE SODIUM 150 MCG: 150 TABLET ORAL at 09:21

## 2022-05-21 RX ADMIN — ACETAMINOPHEN 975 MG: 325 TABLET ORAL at 06:13

## 2022-05-21 RX ADMIN — ACETAMINOPHEN 975 MG: 325 TABLET ORAL at 00:26

## 2022-05-21 RX ADMIN — SENNOSIDES AND DOCUSATE SODIUM 1 TABLET: 50; 8.6 TABLET ORAL at 09:22

## 2022-05-21 RX ADMIN — IBUPROFEN 800 MG: 400 TABLET ORAL at 03:13

## 2022-05-21 RX ADMIN — OXYCODONE HYDROCHLORIDE 5 MG: 5 TABLET ORAL at 00:26

## 2022-05-21 RX ADMIN — SERTRALINE HYDROCHLORIDE 100 MG: 50 TABLET ORAL at 09:22

## 2022-05-21 NOTE — PROGRESS NOTES
Discharge instructions completed both written and verbal and questions answered. Discharged home with baby.Discharged home with her father. Discharged via wheel chair. Escorted to vehicle per myself.

## 2022-05-21 NOTE — PROGRESS NOTES
Progress note    Pt desires POD # 2 discharge, + flatus no stool     EXAM  /79   Pulse 75   Temp (!) 96.62  F (35.9  C)   Resp 18   LMP 2021 (Exact Date)   SpO2 98%   Breastfeeding Unknown   Gen - NAD  Abd - soft, non-tender  Incision - C/D/I, Steri's  VE - scant locia      A/P POD # 2 s/p    discharge home at pt request   F/u 2 weeks  Hemoglobin   Date Value Ref Range Status   2022 9.1 (L) 11.7 - 15.7 g/dL Final   2020 12.6 11.7 - 15.7 g/dL Final     Pt has iron at home    DANIELA ARIAS MD

## 2022-05-21 NOTE — DISCHARGE INSTRUCTIONS
Contact physician for questions and or concerns.    Please refer to mother/baby education book for discharge instructions.

## 2022-05-21 NOTE — DISCHARGE SUMMARY
[facility] Discharge Summary    Hodan Iniguez MRN# [id number]   Age: 41 year old YOB: 1980     Date of Admission:  2022  Date of Discharge::  2022  Admitting Physician:  Mireya Betancur MD  Discharge Physician:  Parrish Quiroz MD     Home clinic: [facility]          Admission Diagnoses:   Polyhydramnios in third trimester complication, single or unspecified fetus [O40.3XX0]          Discharge Diagnosis:   SIUP at term  S/P  delivery  Acute blood loss anemia          Procedures:   Procedure(s): Primary low transverse  section       No procedures performed during this admission           Medications Prior to Admission:     Medications Prior to Admission   Medication Sig Dispense Refill Last Dose     ferrous sulfate (FEROSUL) 325 (65 Fe) MG tablet Take 1 tablet (325 mg) by mouth daily (with breakfast) 60 tablet 1      levothyroxine (SYNTHROID/LEVOTHROID) 75 MCG tablet Take 2 tablets (150 mcg) by mouth daily 180 tablet 1      metFORMIN (GLUCOPHAGE) 500 MG tablet TAKE 1 TABLET(500 MG) BY MOUTH BID with meals 180 tablet 0      Prenatal Vit-Fe Fumarate-FA (PRENATAL MULTIVITAMIN W/IRON) 27-0.8 MG tablet Take 1 tablet by mouth daily 90 tablet 3      sertraline (ZOLOFT) 100 MG tablet Take 1.5 tablets (150 mg) by mouth daily 90 tablet 1              Discharge Medications:     Current Discharge Medication List      START taking these medications    Details   ibuprofen (ADVIL/MOTRIN) 800 MG tablet Take 1 tablet (800 mg) by mouth every 6 hours  Qty: 30 tablet, Refills: 0    Associated Diagnoses: S/P  section      oxyCODONE (ROXICODONE) 5 MG tablet Take 1 tablet (5 mg) by mouth every 4 hours as needed  Qty: 30 tablet, Refills: 0    Associated Diagnoses: S/P  section      senna-docusate (SENOKOT-S/PERICOLACE) 8.6-50 MG tablet Take 1 tablet by mouth 2 times daily  Qty: 30 tablet, Refills: 0    Associated Diagnoses: S/P  section         CONTINUE these  medications which have NOT CHANGED    Details   ferrous sulfate (FEROSUL) 325 (65 Fe) MG tablet Take 1 tablet (325 mg) by mouth daily (with breakfast)  Qty: 60 tablet, Refills: 1    Associated Diagnoses: Anemia affecting pregnancy in second trimester      levothyroxine (SYNTHROID/LEVOTHROID) 75 MCG tablet Take 2 tablets (150 mcg) by mouth daily  Qty: 180 tablet, Refills: 1    Associated Diagnoses: Hypothyroidism affecting pregnancy in first trimester      metFORMIN (GLUCOPHAGE) 500 MG tablet TAKE 1 TABLET(500 MG) BY MOUTH BID with meals  Qty: 180 tablet, Refills: 0    Associated Diagnoses: PCOS (polycystic ovarian syndrome)      Prenatal Vit-Fe Fumarate-FA (PRENATAL MULTIVITAMIN W/IRON) 27-0.8 MG tablet Take 1 tablet by mouth daily  Qty: 90 tablet, Refills: 3    Associated Diagnoses: Pregnancy test positive      sertraline (ZOLOFT) 100 MG tablet Take 1.5 tablets (150 mg) by mouth daily  Qty: 90 tablet, Refills: 1    Associated Diagnoses: Moderate episode of recurrent major depressive disorder (H)                   Consultations:   No consultations were requested during this admission          Brief History of Labor or Admission:   See labor record           Hospital Course:   The patient's hospital course was unremarkable.  She recovered as anticipated and experienced no post-operative complications.   On discharge, her pain was well controlled.  Vaginal bleeding is similar to peak menstrual flow.  Voiding without difficulty.  Ambulating well and tolerating a normal diet.  No fever or significant wound drainage.  Breastfeeding  well.  Infant is stable.  No bowel movement yet.   She was discharged on post-partum day #2 at pt request.    Post-partum hemoglobin:   Hemoglobin   Date Value Ref Range Status   05/20/2022 9.1 (L) 11.7 - 15.7 g/dL Final   03/04/2020 12.6 11.7 - 15.7 g/dL Final             Discharge Instructions and Follow-Up:   Discharge diet: Regular   Discharge activity: Lifting restricted to 20 pounds  No  lifting, driving, or strenuous exercise for 6 week(s)  No sex for 6 week(s)  No driving or operating machinery while on narcotic analgesics  Pelvic rest: abstain from intercourse and do not use tampons for 6 week(s)   Discharge follow-up: Follow up with primary care provider in 2 and 6 weeks   Wound care: Drink plenty of fluids  Ice to area for comfort  Keep wound clean and dry  Steri-strips off in 7 days           Discharge Disposition:   Discharged to home      Attestation:  I have reviewed today's vital signs, notes, medications, labs and imaging.    Parrish Quiroz MD

## 2022-05-23 ENCOUNTER — LACTATION ENCOUNTER (OUTPATIENT)
Age: 42
End: 2022-05-23

## 2022-05-23 ENCOUNTER — HOSPITAL ENCOUNTER (OUTPATIENT)
Dept: OBGYN | Facility: OTHER | Age: 42
Discharge: HOME | End: 2022-05-23
Attending: STUDENT IN AN ORGANIZED HEALTH CARE EDUCATION/TRAINING PROGRAM
Payer: COMMERCIAL

## 2022-05-23 PROCEDURE — S9443 LACTATION CLASS: HCPCS | Performed by: REGISTERED NURSE

## 2022-05-23 NOTE — LACTATION NOTE
This note was copied from a baby's chart.  Outpatient Lactation Visit    MaleCassie Iniguez  [id number]    Consultation Date: May 23, 2022     Reason for Lactation Referral: Initial Lactation Consult    Baby's : 2022    Baby's Current Age: 4 day old  Baby's Gestational Age: Gestational Age: 39w3d    Primary Care Provider: No primary care provider on file.    Presenting Problem (concerns as stated by parent): no concerns    MATERNAL HISTORY   History of Breast Surgery: no  Breast Changes During Pregnancy: no  Breast Feeding History: primigravida  Maternal Meds: daily prenatal vitamin  Pregnancy Complications: none  Anesthesia during labor: spinal    MATERNAL ASSESSMENT    Breast Size: average, symmetrical, soft after feeding and filling prior to feeding  Nipple Appearance - Left: slightly cracked, with signs of healing, education on further healing techniques provided  Nipple Appearance - Right: slightly cracked, with signs of healing, education on further healing techniques provided  Nipple Erectility - Left: erect with stimulation  Nipple Erectility - Right: erect with stimulation  Areolas Compressibility: soft  Nipple Size: average  Special Equipment Used: none  Day mother reports milk came in:  Day 3    INFANT ASSESSMENT    Oral Anatomy  Mouth: normal  Palate: normal  Jaw: normal  Tongue: normal  Frenulum: normal   Digital Suck Exam: root    FEEDING   Feeding Time: aggressively for 20 minutes  Position:  cradle  Effort to Latch: awake and alert, latched easily  Duration of Breast Feeding: Right Breast: 0; Left Breast: 20  Results: excellent breast feed    Volume of Intake:    Birth Weight: 8 lb 13.9 oz    Hospital discharge weight: 8 lb 4.3 oz    Today's Weight 8 lb 2 oz    Total Intake: 1 oz  Output: 2 soil diapers in last 24 hours, 3-4 wet diapers in last 24 hours    LATCH Score:   Latch: 2 - Good Latch  Audible Swallowin - Spontaneous & frequent  Type of Nipple: (Breast/Nipple) 2 - Everted  Comfort:  1 - Filling, small blisters, mild/mod pain  Hold: 2 - No Assist   Total LATCH Score:  9    FEEDING PLAN    Home Feeding Plan: Continue to feed on demand when  elicits feeding cues with deep latch.  Babe should be eating 8-12 times in a 24 hour period.  Exclusivity explained and encouraged in the early weeks to establish breastfeeding and order in milk supply.  Rooming-in encouraged with explanation of the benefits.  Continue to apply expressed breast milk and Lanolin cream to nipples after feedings for healing and comfort.  Postpartum breastfeeding assessment completed and education provided.  Items included in the education are:     proper positioning and latch    effectiveness of feeding    manual expression    handling and storing breastmilk    maintenance of breastfeeding for the first 6 months    sign/symptoms of infant feeding issues requiring referral to qualified health care provider    LACTATION COMMENTS   Deep latch explained for proper positioning of breast in infant's mouth, maximizing milk transfer and comfort.  Reassurance and encouragement provided in regard to mom's concerns about milk supply.  Follow-up support information provided.  Parents plan to keep  Well-Child Check with Dr. Calhoun as scheduled for 2 week well child check.      Face-to-face Time: 60 minutes with assessment and education.    Soo Vargas RN  2022  9:20 AM

## 2022-05-28 ENCOUNTER — OFFICE VISIT (OUTPATIENT)
Dept: FAMILY MEDICINE | Facility: OTHER | Age: 42
End: 2022-05-28
Attending: PHYSICIAN ASSISTANT
Payer: COMMERCIAL

## 2022-05-28 VITALS
RESPIRATION RATE: 20 BRPM | OXYGEN SATURATION: 97 % | BODY MASS INDEX: 34.66 KG/M2 | HEART RATE: 80 BPM | WEIGHT: 201.9 LBS | DIASTOLIC BLOOD PRESSURE: 80 MMHG | SYSTOLIC BLOOD PRESSURE: 114 MMHG | TEMPERATURE: 99.1 F

## 2022-05-28 DIAGNOSIS — L76.82 INCISIONAL PAIN: Primary | ICD-10-CM

## 2022-05-28 PROCEDURE — G0463 HOSPITAL OUTPT CLINIC VISIT: HCPCS

## 2022-05-28 PROCEDURE — 99213 OFFICE O/P EST LOW 20 MIN: CPT | Performed by: FAMILY MEDICINE

## 2022-05-28 ASSESSMENT — PAIN SCALES - GENERAL: PAINLEVEL: SEVERE PAIN (7)

## 2022-05-28 NOTE — PROGRESS NOTES
"Nursing Notes:   Mike Faulkner LPN  2022 12:05 PM  Signed  Chief Complaint   Patient presents with     Incisional Pain      incision     Patient presents to  with  incisional pain. Patient stated she feels a burning sensation from mid incision out to the right. Noticed some incisional swelling today. Had  on 22.    Initial /80 (BP Location: Left arm, Patient Position: Sitting, Cuff Size: Adult Large)   Pulse 80   Temp 99.1  F (37.3  C) (Tympanic)   Resp 20   Wt 91.6 kg (201 lb 14.4 oz)   LMP 2021 (Exact Date)   SpO2 97%   Breastfeeding Yes   BMI 34.66 kg/m   Estimated body mass index is 34.66 kg/m  as calculated from the following:    Height as of 21: 1.626 m (5' 4\").    Weight as of this encounter: 91.6 kg (201 lb 14.4 oz).  Medication Reconciliation: Completed     Advanced Care Directive Reviewed    Mike Faulkner LPN      Assessment & Plan     Incisional pain    Plan:  Reassurance.   Monitor for warmth, redness or drainage from site.   Follow-up as needed and for post partum exam.      Michelle Miguel MD  [facility]    Jarod Pettit is a 41 year old who presents for the following health issues:concerns about her incision after C section    HPI     Concern -  Had  on 2022.  Area seemed a bit more swollen and some burning on right side.  No drainage or redness.  Onset: Last couple of day. NO fever, chills or other symptoms. Fatigued from having new baby and minimal sleep.         Review of Systems     Constitutional, HEENT, cardiovascular, pulmonary, gi and gu systems are negative, except as otherwise noted.  Social History     Social History Narrative    Lives with parents.  Works at [employer]      No Known Allergies  Patient Active Problem List    Diagnosis Date Noted     Hypothyroidism affecting pregnancy in third trimester 2022     Priority: Medium     Polyhydramnios in third trimester complication, " single or unspecified fetus 05/18/2022     Priority: Medium     Encounter for triage in pregnant patient 05/06/2022     Priority: Medium     Alcohol dependence (H) 01/12/2022     Priority: Medium     Major depression, recurrent (H) 01/04/2013     Priority: Medium     Hypothyroidism 09/24/2010     Priority: Medium       Objective    /80 (BP Location: Left arm, Patient Position: Sitting, Cuff Size: Adult Large)   Pulse 80   Temp 99.1  F (37.3  C) (Tympanic)   Resp 20   Wt 91.6 kg (201 lb 14.4 oz)   LMP 08/16/2021 (Exact Date)   SpO2 97%   Breastfeeding Yes   BMI 34.66 kg/m    Body mass index is 34.66 kg/m .  Physical Exam   GENERAL: healthy, alert and no distress  SKIN:   Incision healing very well. Mild bruising. NO dehiscence or drainage and no signs of infection.  Residual steri strips removed.

## 2022-05-28 NOTE — NURSING NOTE
"Chief Complaint   Patient presents with     Incisional Pain      incision     Patient presents to  with  incisional pain. Patient stated she feels a burning sensation from mid incision out to the right. Noticed some incisional swelling today. Had  on 22.    Initial /80 (BP Location: Left arm, Patient Position: Sitting, Cuff Size: Adult Large)   Pulse 80   Temp 99.1  F (37.3  C) (Tympanic)   Resp 20   Wt 91.6 kg (201 lb 14.4 oz)   LMP 2021 (Exact Date)   SpO2 97%   Breastfeeding Yes   BMI 34.66 kg/m   Estimated body mass index is 34.66 kg/m  as calculated from the following:    Height as of 21: 1.626 m (5' 4\").    Weight as of this encounter: 91.6 kg (201 lb 14.4 oz).  Medication Reconciliation: Completed     Advanced Care Directive Reviewed    Mike Faulkner LPN  "

## 2022-05-31 ENCOUNTER — MEDICAL CORRESPONDENCE (OUTPATIENT)
Dept: HEALTH INFORMATION MANAGEMENT | Facility: OTHER | Age: 42
End: 2022-05-31
Payer: COMMERCIAL

## 2022-06-01 ENCOUNTER — OFFICE VISIT (OUTPATIENT)
Dept: OBGYN | Facility: OTHER | Age: 42
End: 2022-06-01
Attending: STUDENT IN AN ORGANIZED HEALTH CARE EDUCATION/TRAINING PROGRAM
Payer: COMMERCIAL

## 2022-06-01 VITALS
DIASTOLIC BLOOD PRESSURE: 72 MMHG | WEIGHT: 198.7 LBS | SYSTOLIC BLOOD PRESSURE: 120 MMHG | TEMPERATURE: 98.2 F | BODY MASS INDEX: 34.11 KG/M2 | HEART RATE: 92 BPM

## 2022-06-01 DIAGNOSIS — Z98.891 S/P CESAREAN SECTION: Primary | ICD-10-CM

## 2022-06-01 LAB
ALBUMIN UR-MCNC: 10 MG/DL
APPEARANCE UR: ABNORMAL
BACTERIA #/AREA URNS HPF: ABNORMAL /HPF
BILIRUB UR QL STRIP: NEGATIVE
COLOR UR AUTO: ABNORMAL
GLUCOSE UR STRIP-MCNC: NEGATIVE MG/DL
HCG UR QL: NEGATIVE
HGB UR QL STRIP: ABNORMAL
KETONES UR STRIP-MCNC: NEGATIVE MG/DL
LEUKOCYTE ESTERASE UR QL STRIP: ABNORMAL
MUCOUS THREADS #/AREA URNS LPF: PRESENT /LPF
NITRATE UR QL: NEGATIVE
PH UR STRIP: 6 [PH] (ref 5–9)
RBC URINE: 19 /HPF
SP GR UR STRIP: 1.01 (ref 1–1.03)
SQUAMOUS EPITHELIAL: 1 /HPF
UROBILINOGEN UR STRIP-MCNC: NORMAL MG/DL
WBC CLUMPS #/AREA URNS HPF: PRESENT /HPF
WBC URINE: 91 /HPF

## 2022-06-01 PROCEDURE — 99024 POSTOP FOLLOW-UP VISIT: CPT | Performed by: STUDENT IN AN ORGANIZED HEALTH CARE EDUCATION/TRAINING PROGRAM

## 2022-06-01 PROCEDURE — 81025 URINE PREGNANCY TEST: CPT | Mod: ZL | Performed by: STUDENT IN AN ORGANIZED HEALTH CARE EDUCATION/TRAINING PROGRAM

## 2022-06-01 PROCEDURE — 87086 URINE CULTURE/COLONY COUNT: CPT | Mod: ZL | Performed by: STUDENT IN AN ORGANIZED HEALTH CARE EDUCATION/TRAINING PROGRAM

## 2022-06-01 PROCEDURE — 81001 URINALYSIS AUTO W/SCOPE: CPT | Mod: ZL | Performed by: STUDENT IN AN ORGANIZED HEALTH CARE EDUCATION/TRAINING PROGRAM

## 2022-06-01 RX ORDER — ACETAMINOPHEN AND CODEINE PHOSPHATE 120; 12 MG/5ML; MG/5ML
0.35 SOLUTION ORAL DAILY
Qty: 84 TABLET | Refills: 4 | Status: SHIPPED | OUTPATIENT
Start: 2022-06-01 | End: 2023-07-10

## 2022-06-01 NOTE — NURSING NOTE
Pt presents to clinic today for 2 weeks post partum.      Medication Reconciliation: complete  Trice Alfaro LPN

## 2022-06-01 NOTE — PROGRESS NOTES
Postpartum Office Visit    S: Ms. Iniguez is a  who is s/p  section on 2022 due to fetal intolerance of labor. Her delivery was uncomplicated. Her postpartum course in the hospital was also uncomplicated. She is feeling well today. She has no acute concerns. She notes her mood has been good, denies depression or any concern for harm to herself or others. She has been breast feeding the baby. Desires POPs for contraception.     O:   /72   Pulse 92   Temp 98.2  F (36.8  C) (Tympanic)   Wt 90.1 kg (198 lb 11.2 oz)   LMP 2021 (Exact Date)   Breastfeeding Yes   BMI 34.11 kg/m      Gen: Well-appearing, NAD  Abomen: incision is healing well, no sign of erythema, warmth, discharge or bleeding. Soft, non-tender  Pelvic: deferred as she has no acute concerns    Assessment:  Ms. Hodan Iniguez is a 41 year old yo now  who is s/p  section on 2022. She is doing well in the postpartum period.    Plan:  # Routine postpartum care  -- Feeling well, no concerns  -- breast feeding  -- Contraception goals: POPs  -- Mood stable and doing well    Return to clinic in 4 weeks for 6 week postpartum visit or sooner with any questions or concerns    Sasha Keller MD  OB/GYN  2022 10:20 AM

## 2022-06-03 LAB — BACTERIA UR CULT: NO GROWTH

## 2022-06-30 ENCOUNTER — PRENATAL OFFICE VISIT (OUTPATIENT)
Dept: OBGYN | Facility: OTHER | Age: 42
End: 2022-06-30
Attending: STUDENT IN AN ORGANIZED HEALTH CARE EDUCATION/TRAINING PROGRAM
Payer: COMMERCIAL

## 2022-06-30 VITALS
SYSTOLIC BLOOD PRESSURE: 124 MMHG | BODY MASS INDEX: 35.48 KG/M2 | TEMPERATURE: 97.3 F | DIASTOLIC BLOOD PRESSURE: 74 MMHG | WEIGHT: 206.7 LBS | HEART RATE: 90 BPM

## 2022-06-30 ASSESSMENT — PATIENT HEALTH QUESTIONNAIRE - PHQ9
SUM OF ALL RESPONSES TO PHQ QUESTIONS 1-9: 4
SUM OF ALL RESPONSES TO PHQ QUESTIONS 1-9: 4
10. IF YOU CHECKED OFF ANY PROBLEMS, HOW DIFFICULT HAVE THESE PROBLEMS MADE IT FOR YOU TO DO YOUR WORK, TAKE CARE OF THINGS AT HOME, OR GET ALONG WITH OTHER PEOPLE: SOMEWHAT DIFFICULT

## 2022-06-30 ASSESSMENT — EDINBURGH POSTNATAL DEPRESSION SCALE (EPDS)
I HAVE FELT SCARED OR PANICKY FOR NO GOOD REASON: NO, NOT MUCH
THE THOUGHT OF HARMING MYSELF HAS OCCURRED TO ME: NEVER
I HAVE BEEN SO UNHAPPY THAT I HAVE HAD DIFFICULTY SLEEPING: NOT AT ALL
I HAVE BEEN SO UNHAPPY THAT I HAVE BEEN CRYING: ONLY OCCASIONALLY
TOTAL SCORE: 8
I HAVE BEEN SO UNHAPPY THAT I HAVE HAD DIFFICULTY SLEEPING: NOT AT ALL
I HAVE BEEN ANXIOUS OR WORRIED FOR NO GOOD REASON: HARDLY EVER
I HAVE BLAMED MYSELF UNNECESSARILY WHEN THINGS WENT WRONG: YES, SOME OF THE TIME
THINGS HAVE BEEN GETTING ON TOP OF ME: YES, SOMETIMES I HAVEN'T BEEN COPING AS WELL AS USUAL
I HAVE BEEN ANXIOUS OR WORRIED FOR NO GOOD REASON: HARDLY EVER
THINGS HAVE BEEN GETTING ON TOP OF ME: YES, SOMETIMES I HAVEN'T BEEN COPING AS WELL AS USUAL
I HAVE FELT SCARED OR PANICKY FOR NO GOOD REASON: NO, NOT MUCH
I HAVE BEEN SO UNHAPPY THAT I HAVE BEEN CRYING: ONLY OCCASIONALLY
I HAVE LOOKED FORWARD WITH ENJOYMENT TO THINGS: AS MUCH AS I EVER DID
I HAVE FELT SAD OR MISERABLE: NOT VERY OFTEN
I HAVE BEEN ABLE TO LAUGH AND SEE THE FUNNY SIDE OF THINGS: AS MUCH AS I ALWAYS COULD
I HAVE BLAMED MYSELF UNNECESSARILY WHEN THINGS WENT WRONG: YES, SOME OF THE TIME
I HAVE FELT SAD OR MISERABLE: NOT VERY OFTEN
I HAVE BEEN ABLE TO LAUGH AND SEE THE FUNNY SIDE OF THINGS: AS MUCH AS I ALWAYS COULD
THE THOUGHT OF HARMING MYSELF HAS OCCURRED TO ME: NEVER
I HAVE LOOKED FORWARD WITH ENJOYMENT TO THINGS: AS MUCH AS I EVER DID

## 2022-06-30 ASSESSMENT — ANXIETY QUESTIONNAIRES
6. BECOMING EASILY ANNOYED OR IRRITABLE: NOT AT ALL
4. TROUBLE RELAXING: SEVERAL DAYS
GAD7 TOTAL SCORE: 5
7. FEELING AFRAID AS IF SOMETHING AWFUL MIGHT HAPPEN: SEVERAL DAYS
GAD7 TOTAL SCORE: 5
GAD7 TOTAL SCORE: 5
3. WORRYING TOO MUCH ABOUT DIFFERENT THINGS: SEVERAL DAYS
8. IF YOU CHECKED OFF ANY PROBLEMS, HOW DIFFICULT HAVE THESE MADE IT FOR YOU TO DO YOUR WORK, TAKE CARE OF THINGS AT HOME, OR GET ALONG WITH OTHER PEOPLE?: NOT DIFFICULT AT ALL
1. FEELING NERVOUS, ANXIOUS, OR ON EDGE: SEVERAL DAYS
7. FEELING AFRAID AS IF SOMETHING AWFUL MIGHT HAPPEN: SEVERAL DAYS
2. NOT BEING ABLE TO STOP OR CONTROL WORRYING: SEVERAL DAYS
5. BEING SO RESTLESS THAT IT IS HARD TO SIT STILL: NOT AT ALL

## 2022-06-30 NOTE — PROGRESS NOTES
Pt presents to clinic today for 6 week post partum. Pt was not seen by provider and writer is going to close encounter at this time.

## 2022-06-30 NOTE — NURSING NOTE
Pt presents to clinic today for 6 week post partum. Pt was not seen by provider and writer is going to close encounter at this time.      Medication Reconciliation: complete  Trice Alfaro LPN

## 2022-08-18 DIAGNOSIS — F33.1 MODERATE EPISODE OF RECURRENT MAJOR DEPRESSIVE DISORDER (H): ICD-10-CM

## 2022-08-18 RX ORDER — SERTRALINE HYDROCHLORIDE 100 MG/1
TABLET, FILM COATED ORAL
Qty: 90 TABLET | Refills: 1 | Status: SHIPPED | OUTPATIENT
Start: 2022-08-18 | End: 2022-10-28

## 2022-08-18 NOTE — TELEPHONE ENCOUNTER
Refill medication.  Needs to be seen for physical and medication recheck prior to future refills.  Toña Beckham PA-C.......... 8/18/2022 12:00 PM

## 2022-08-18 NOTE — TELEPHONE ENCOUNTER
Pt no longer under ALDs care. Routed to [facility] for her consideration.     Yanelis Valverde RN on 8/18/2022 at 10:24 AM

## 2022-08-29 NOTE — TELEPHONE ENCOUNTER
Left message to call back. Closing encounter hasn't answered in over a week.    Avery Hansen, LPNLPN....................  8/29/2022   12:51 PM

## 2022-10-05 ENCOUNTER — MEDICAL CORRESPONDENCE (OUTPATIENT)
Dept: HEALTH INFORMATION MANAGEMENT | Facility: OTHER | Age: 42
End: 2022-10-05

## 2022-10-21 ASSESSMENT — ANXIETY QUESTIONNAIRES
6. BECOMING EASILY ANNOYED OR IRRITABLE: NOT AT ALL
IF YOU CHECKED OFF ANY PROBLEMS ON THIS QUESTIONNAIRE, HOW DIFFICULT HAVE THESE PROBLEMS MADE IT FOR YOU TO DO YOUR WORK, TAKE CARE OF THINGS AT HOME, OR GET ALONG WITH OTHER PEOPLE: NOT DIFFICULT AT ALL
7. FEELING AFRAID AS IF SOMETHING AWFUL MIGHT HAPPEN: SEVERAL DAYS
GAD7 TOTAL SCORE: 4
2. NOT BEING ABLE TO STOP OR CONTROL WORRYING: NOT AT ALL
7. FEELING AFRAID AS IF SOMETHING AWFUL MIGHT HAPPEN: SEVERAL DAYS
1. FEELING NERVOUS, ANXIOUS, OR ON EDGE: SEVERAL DAYS
5. BEING SO RESTLESS THAT IT IS HARD TO SIT STILL: SEVERAL DAYS
8. IF YOU CHECKED OFF ANY PROBLEMS, HOW DIFFICULT HAVE THESE MADE IT FOR YOU TO DO YOUR WORK, TAKE CARE OF THINGS AT HOME, OR GET ALONG WITH OTHER PEOPLE?: NOT DIFFICULT AT ALL
3. WORRYING TOO MUCH ABOUT DIFFERENT THINGS: SEVERAL DAYS
4. TROUBLE RELAXING: NOT AT ALL

## 2022-10-21 ASSESSMENT — PATIENT HEALTH QUESTIONNAIRE - PHQ9
10. IF YOU CHECKED OFF ANY PROBLEMS, HOW DIFFICULT HAVE THESE PROBLEMS MADE IT FOR YOU TO DO YOUR WORK, TAKE CARE OF THINGS AT HOME, OR GET ALONG WITH OTHER PEOPLE: NOT DIFFICULT AT ALL
SUM OF ALL RESPONSES TO PHQ QUESTIONS 1-9: 4
SUM OF ALL RESPONSES TO PHQ QUESTIONS 1-9: 4

## 2022-10-28 ENCOUNTER — OFFICE VISIT (OUTPATIENT)
Dept: FAMILY MEDICINE | Facility: OTHER | Age: 42
End: 2022-10-28
Attending: PHYSICIAN ASSISTANT
Payer: COMMERCIAL

## 2022-10-28 VITALS
HEART RATE: 85 BPM | SYSTOLIC BLOOD PRESSURE: 114 MMHG | DIASTOLIC BLOOD PRESSURE: 78 MMHG | TEMPERATURE: 96.8 F | RESPIRATION RATE: 20 BRPM | WEIGHT: 235.4 LBS | BODY MASS INDEX: 40.41 KG/M2 | OXYGEN SATURATION: 97 %

## 2022-10-28 DIAGNOSIS — Z23 NEEDS FLU SHOT: ICD-10-CM

## 2022-10-28 DIAGNOSIS — F33.1 MODERATE EPISODE OF RECURRENT MAJOR DEPRESSIVE DISORDER (H): Primary | ICD-10-CM

## 2022-10-28 DIAGNOSIS — Z23 NEED FOR COVID-19 VACCINE: ICD-10-CM

## 2022-10-28 DIAGNOSIS — E03.9 HYPOTHYROIDISM, UNSPECIFIED TYPE: ICD-10-CM

## 2022-10-28 DIAGNOSIS — F41.9 MODERATE ANXIETY: ICD-10-CM

## 2022-10-28 DIAGNOSIS — E28.2 PCOS (POLYCYSTIC OVARIAN SYNDROME): ICD-10-CM

## 2022-10-28 DIAGNOSIS — E66.01 MORBID OBESITY (H): ICD-10-CM

## 2022-10-28 DIAGNOSIS — Z11.3 SCREEN FOR STD (SEXUALLY TRANSMITTED DISEASE): ICD-10-CM

## 2022-10-28 LAB — TSH SERPL DL<=0.005 MIU/L-ACNC: 4.14 MU/L (ref 0.4–4)

## 2022-10-28 PROCEDURE — 86706 HEP B SURFACE ANTIBODY: CPT | Mod: ZL | Performed by: PHYSICIAN ASSISTANT

## 2022-10-28 PROCEDURE — 91312 COVID-19,PF,PFIZER BOOSTER BIVALENT: CPT

## 2022-10-28 PROCEDURE — 0124A COVID-19,PF,PFIZER BOOSTER BIVALENT: CPT

## 2022-10-28 PROCEDURE — 87340 HEPATITIS B SURFACE AG IA: CPT | Mod: ZL | Performed by: PHYSICIAN ASSISTANT

## 2022-10-28 PROCEDURE — 36415 COLL VENOUS BLD VENIPUNCTURE: CPT | Mod: ZL | Performed by: PHYSICIAN ASSISTANT

## 2022-10-28 PROCEDURE — 90686 IIV4 VACC NO PRSV 0.5 ML IM: CPT

## 2022-10-28 PROCEDURE — 87389 HIV-1 AG W/HIV-1&-2 AB AG IA: CPT | Mod: ZL | Performed by: PHYSICIAN ASSISTANT

## 2022-10-28 PROCEDURE — G0463 HOSPITAL OUTPT CLINIC VISIT: HCPCS

## 2022-10-28 PROCEDURE — 99214 OFFICE O/P EST MOD 30 MIN: CPT | Performed by: PHYSICIAN ASSISTANT

## 2022-10-28 PROCEDURE — 84443 ASSAY THYROID STIM HORMONE: CPT | Mod: ZL | Performed by: PHYSICIAN ASSISTANT

## 2022-10-28 PROCEDURE — G0463 HOSPITAL OUTPT CLINIC VISIT: HCPCS | Mod: 25

## 2022-10-28 PROCEDURE — 86780 TREPONEMA PALLIDUM: CPT | Mod: ZL | Performed by: PHYSICIAN ASSISTANT

## 2022-10-28 PROCEDURE — 86803 HEPATITIS C AB TEST: CPT | Mod: ZL | Performed by: PHYSICIAN ASSISTANT

## 2022-10-28 PROCEDURE — G0008 ADMIN INFLUENZA VIRUS VAC: HCPCS

## 2022-10-28 RX ORDER — SERTRALINE HYDROCHLORIDE 100 MG/1
200 TABLET, FILM COATED ORAL DAILY
Qty: 180 TABLET | Refills: 3 | Status: SHIPPED | OUTPATIENT
Start: 2022-10-28 | End: 2022-12-05

## 2022-10-28 ASSESSMENT — ANXIETY QUESTIONNAIRES: GAD7 TOTAL SCORE: 4

## 2022-10-28 ASSESSMENT — PAIN SCALES - GENERAL: PAINLEVEL: NO PAIN (0)

## 2022-10-28 ASSESSMENT — PATIENT HEALTH QUESTIONNAIRE - PHQ9
10. IF YOU CHECKED OFF ANY PROBLEMS, HOW DIFFICULT HAVE THESE PROBLEMS MADE IT FOR YOU TO DO YOUR WORK, TAKE CARE OF THINGS AT HOME, OR GET ALONG WITH OTHER PEOPLE: NOT DIFFICULT AT ALL
SUM OF ALL RESPONSES TO PHQ QUESTIONS 1-9: 4

## 2022-10-28 NOTE — LETTER
My Depression Action Plan  Name: Hodan Iniguez   Date of Birth 1980  Date: 10/28/2022    My doctor: No Ref-Primary, Physician   My clinic: [facility]  [address]  [place] [id number] [phone]          GREEN    ZONE   Good Control    What it looks like:     Things are going generally well. You have normal ups and downs. You may even feel depressed from time to time, but bad moods usually last less than a day.   What you need to do:  1. Continue to care for yourself (see self care plan)  2. Check your depression survival kit and update it as needed  3. Follow your physician s recommendations including any medication.  4. Do not stop taking medication unless you consult with your physician first.           YELLOW         ZONE Getting Worse    What it looks like:     Depression is starting to interfere with your life.     It may be hard to get out of bed; you may be starting to isolate yourself from others.    Symptoms of depression are starting to last most all day and this has happened for several days.     You may have suicidal thoughts but they are not constant.   What you need to do:     1. Call your care team. Your response to treatment will improve if you keep your care team informed of your progress. Yellow periods are signs an adjustment may need to be made.     2. Continue your self-care.  Just get dressed and ready for the day.  Don't give yourself time to talk yourself out of it.    3. Talk to someone in your support network.    4. Open up your Depression Self-Care Plan/Wellness Kit.           RED    ZONE Medical Alert - Get Help    What it looks like:     Depression is seriously interfering with your life.     You may experience these or other symptoms: You can t get out of bed most days, can t work or engage in other necessary activities, you have trouble taking care of basic hygiene, or basic responsibilities, thoughts of suicide or death that  will not go away, self-injurious behavior.     What you need to do:  1. Call your care team and request a same-day appointment. If they are not available (weekends or after hours) call your local crisis line, emergency room or 911.          Depression Self-Care Plan / Wellness Kit    Many people find that medication and therapy are helpful treatments for managing depression. In addition, making small changes to your everyday life can help to boost your mood and improve your wellbeing. Below are some tips for you to consider. Be sure to talk with your medical provider and/or behavioral health consultant if your symptoms are worsening or not improving.     Sleep   Sleep hygiene  means all of the habits that support good, restful sleep. It includes maintaining a consistent bedtime and wake time, using your bedroom only for sleeping or sex, and keeping the bedroom dark and free of distractions like a computer, smartphone, or television.     Develop a Healthy Routine  Maintain good hygiene. Get out of bed in the morning, make your bed, brush your teeth, take a shower, and get dressed. Don t spend too much time viewing media that makes you feel stressed. Find time to relax each day.    Exercise  Get some form of exercise every day. This will help reduce pain and release endorphins, the  feel good  chemicals in your brain. It can be as simple as just going for a walk or doing some gardening, anything that will get you moving.      Diet  Strive to eat healthy foods, including fruits and vegetables. Drink plenty of water. Avoid excessive sugar, caffeine, alcohol, and other mood-altering substances.     Stay Connected with Others  Stay in touch with friends and family members.    Manage Your Mood  Try deep breathing, massage therapy, biofeedback, or meditation. Take part in fun activities when you can. Try to find something to smile about each day.     Psychotherapy  Be open to working with a therapist if your provider  recommends it.     Medication  Be sure to take your medication as prescribed. Most anti-depressants need to be taken every day. It usually takes several weeks for medications to work. Not all medicines work for all people. It is important to follow-up with your provider to make sure you have a treatment plan that is working for you. Do not stop your medication abruptly without first discussing it with your provider.    Crisis Resources   These hotlines are for both adults and children. They and are open 24 hours a day, 7 days a week unless noted otherwise.      National Suicide Prevention Lifeline   988 or 1-306-472-ESXV (7505)      Crisis Text Line    www.crisistextline.org  Text HOME to 557564 from anywhere in the [place], anytime, about any type of crisis. A live, trained crisis counselor will receive the text and respond quickly.      [place] Lifeline for LGBTQ Youth  A national crisis intervention and suicide lifeline for LGBTQ youth under 25. Provides a safe place to talk without judgement. Call 1-482.130.6699; text START to 875835 or visit www.thetrevorproject.org to talk to a trained counselor.      For [facility] crisis numbers, visit the [facility] website at:  https://[place].gov/dhs/people-we-serve/adults/health-care/mental-health/resources/crisis-contacts.jsp

## 2022-10-28 NOTE — NURSING NOTE
Pt presents to clinic today for a med check/refills and thyroid labs. Patient states she started taking 2 tabs of zoloft for a month now rather than 1.5 tabs. Feels better with anxiety.       FOOD SECURITY SCREENING QUESTIONS:    The next two questions are to help us understand your food security.  If you are feeling you need any assistance in this area, we have resources available to support you today.    Hunger Vital Signs:  Within the past 12 months we worried whether our food would run out before we got money to buy more. Never  Within the past 12 months the food we bought just didn't last and we didn't have money to get more. Never        Medication Reconciliation: complete  Avery Hansen, NERY,LPN on 10/28/2022 at 2:42 PM

## 2022-10-28 NOTE — PROGRESS NOTES
Assessment & Plan   Problem List Items Addressed This Visit        Digestive    Morbid obesity (H)    Relevant Medications    metFORMIN (GLUCOPHAGE) 500 MG tablet       Endocrine    Hypothyroidism    Relevant Orders    TSH (Completed)    PCOS (polycystic ovarian syndrome)    Relevant Medications    metFORMIN (GLUCOPHAGE) 500 MG tablet       Behavioral    Major depression, recurrent (H) - Primary    Relevant Medications    sertraline (ZOLOFT) 100 MG tablet       Other    Moderate anxiety    Relevant Medications    sertraline (ZOLOFT) 100 MG tablet   Other Visit Diagnoses     Needs flu shot        Relevant Orders    FLU SHOT 6 MOS - 50 YRS (FLUZONE) (Completed)    Need for COVID-19 vaccine        Relevant Orders    COVID-19,PF,PFIZER BOOSTER BIVALENT 12+Yrs (Completed)    Screen for STD (sexually transmitted disease)        Relevant Orders    Hepatitis B Surface Antibody    Hepatitis B Surface Antigen    Hepatitis C Screen Reflex to HCV RNA Quant and Genotype    HIV Antigen Antibody Combo    Treponema Ab w Reflex to RPR and Titer         Screen for STD: Completed HIV, syphilis, hepatitis B and C for screening.  We will set up a separate appointment for vaginal screening.    Patient was given COVID-19 booster along with a flu shot.    Anxiety and depression: Continue Zoloft 200 mg daily.  Gave side effect profile.  Tolerated medication well.  No acute concerns at this time.    Morbid obesity: Encourage good diet, exercise, and weight loss to help reduce future risk of health complications especially with history of hypothyroidism, anxiety, and depression.    PCOS: Restarted metformin.  Gave side effect profile.  Encourage good diet and exercise.  Encouraged recheck in 2 to 3 months for monitoring.    Hypothyroidism: Completed TSH for monitoring.  Depending on TSH level may need to modify medication dose.  Encouraged good diet and exercise.    30 minutes spent on the date of the encounter doing chart review, history  "and exam, documentation and further activities per the note       BMI:   Estimated body mass index is 40.41 kg/m  as calculated from the following:    Height as of 11/11/21: 1.626 m (5' 4\").    Weight as of this encounter: 106.8 kg (235 lb 6.4 oz).   Weight management plan: Discussed healthy diet and exercise guidelines    See Patient Instructions    No follow-ups on file.    Toña Beckham PA-C  [facility]    Jarod Pettit is a 42 year old, presenting for the following health issues:  Refill Request and Blood Draw      History of Present Illness       Reason for visit:  Thyroid check, zoloft check, check for yeast infection and stds and restart metformin for pcos    She eats 0-1 servings of fruits and vegetables daily.She consumes 1 sweetened beverage(s) daily.She exercises with enough effort to increase her heart rate 9 or less minutes per day.  She exercises with enough effort to increase her heart rate 3 or less days per week. She is missing 1 dose(s) of medications per week.  She is not taking prescribed medications regularly due to remembering to take.    Today's PHQ-9         PHQ-9 Total Score: 4    PHQ-9 Q9 Thoughts of better off dead/self-harm past 2 weeks :   Not at all    How difficult have these problems made it for you to do your work, take care of things at home, or get along with other people: Not difficult at all  Today's JUAN CARLOS-7 Score: 4       Medication Followup of Zoloft     Taking Medication as prescribed: NO-taking 2 tabs rather than 1.5    Side Effects:  None    Medication Helping Symptoms:  yes    Depression and anxiety: Patient has noticed increased anxiety over the last 6 months.  She recently increased her Zoloft to 200 mg daily.  Tolerating the new dose well.  No side effects noted.  No increased depression concerns.  No suicidal or homicidal ideation.    Hypothyroidism: Patient is interested in having her thyroid level rechecked.  Currently taking levothyroxine " 150 mcg daily.  Concerned that her weight is increasing.  Has been more sluggish over the last couple months.  Wondering if she needs a dose modification.    PCOS: Patient is history of PCOS.  Previously was taking metformin.  Wondering about restarting.  Tolerated the medication well.  No side effects noted.    Patient due for a flu shot and COVID-19 booster.  Also interested in STD screening.  Currently asymptomatic.  Would like blood work completed today for STD screening.  We will need to set up a separate appointment for vaginal testing.    Review of Systems   Constitutional, HEENT, cardiovascular, pulmonary, gi and gu systems are negative, except as otherwise noted.      Objective    /78 (BP Location: Right arm, Patient Position: Sitting, Cuff Size: Adult Large)   Pulse 85   Temp 96.8  F (36  C) (Tympanic)   Resp 20   Wt 106.8 kg (235 lb 6.4 oz)   LMP 10/25/2022 (Exact Date)   SpO2 97%   Breastfeeding No   BMI 40.41 kg/m    Body mass index is 40.41 kg/m .  Physical Exam  Vitals and nursing note reviewed.   Constitutional:       Appearance: Normal appearance.   HENT:      Head: Normocephalic and atraumatic.   Eyes:      Extraocular Movements: Extraocular movements intact.      Conjunctiva/sclera: Conjunctivae normal.      Pupils: Pupils are equal, round, and reactive to light.   Cardiovascular:      Rate and Rhythm: Normal rate and regular rhythm.      Heart sounds: Normal heart sounds.   Pulmonary:      Effort: Pulmonary effort is normal.      Breath sounds: Normal breath sounds.   Musculoskeletal:         General: Normal range of motion.   Skin:     General: Skin is warm and dry.   Neurological:      General: No focal deficit present.      Mental Status: She is alert and oriented to person, place, and time.   Psychiatric:         Attention and Perception: Attention and perception normal.         Mood and Affect: Mood and affect normal.         Speech: Speech normal.         Behavior: Behavior  normal. Behavior is cooperative.         Thought Content: Thought content normal. Thought content does not include homicidal or suicidal ideation.         Cognition and Memory: Cognition and memory normal.         Judgment: Judgment normal.

## 2022-10-29 PROBLEM — E28.2 PCOS (POLYCYSTIC OVARIAN SYNDROME): Status: ACTIVE | Noted: 2022-10-29

## 2022-10-29 PROBLEM — F41.9 MODERATE ANXIETY: Status: ACTIVE | Noted: 2022-10-29

## 2022-10-30 LAB — T PALLIDUM AB SER QL: NONREACTIVE

## 2022-10-31 ENCOUNTER — MYC MEDICAL ADVICE (OUTPATIENT)
Dept: FAMILY MEDICINE | Facility: OTHER | Age: 42
End: 2022-10-31

## 2022-10-31 DIAGNOSIS — E03.9 HYPOTHYROIDISM, UNSPECIFIED TYPE: Primary | ICD-10-CM

## 2022-10-31 LAB
HBV SURFACE AB SERPL IA-ACNC: 0 M[IU]/ML
HBV SURFACE AB SERPL IA-ACNC: NONREACTIVE M[IU]/ML
HBV SURFACE AG SERPL QL IA: NONREACTIVE
HCV AB SERPL QL IA: NONREACTIVE
HIV 1+2 AB+HIV1 P24 AG SERPL QL IA: NONREACTIVE

## 2022-10-31 RX ORDER — SUMATRIPTAN 50 MG/1
50 TABLET, FILM COATED ORAL
OUTPATIENT
Start: 2022-10-31

## 2022-10-31 NOTE — TELEPHONE ENCOUNTER
[facility] [place] sent Rx request for the following:      Requested Prescriptions   Pending Prescriptions Disp Refills     SUMAtriptan (IMITREX) 50 MG tablet       Sig: Take 1 tablet (50 mg) by mouth at onset of headache for migraine May repeat in 2 hours. Max 4 tablets/24 hours.     Last Prescription Date:   8/20/2021, discontinued 9/27/21    Refill refused: not an active medication.    DEBRA GALLARDO RN on 10/31/2022 at 4:07 PM

## 2022-11-02 RX ORDER — LEVOTHYROXINE SODIUM 175 UG/1
175 TABLET ORAL DAILY
Qty: 90 TABLET | Refills: 1 | Status: SHIPPED | OUTPATIENT
Start: 2022-11-02 | End: 2023-04-24

## 2022-11-04 DIAGNOSIS — Z23 NEED FOR HEPATITIS B VACCINATION: Primary | ICD-10-CM

## 2022-11-07 NOTE — TELEPHONE ENCOUNTER
Attempted to call [facility], was on hold for over 10 minutes, will try again later.  ..Avery Hansen, LPN on 11/7/2022 at 1:55 PM

## 2022-11-09 DIAGNOSIS — F41.9 MODERATE ANXIETY: ICD-10-CM

## 2022-11-09 DIAGNOSIS — F33.1 MODERATE EPISODE OF RECURRENT MAJOR DEPRESSIVE DISORDER (H): ICD-10-CM

## 2022-11-11 RX ORDER — SERTRALINE HYDROCHLORIDE 100 MG/1
150 TABLET, FILM COATED ORAL DAILY
Qty: 135 TABLET | Refills: 3 | OUTPATIENT
Start: 2022-11-11

## 2022-11-11 NOTE — TELEPHONE ENCOUNTER
"[facility], [place] - [address] (Pharmacy) sent Rx request for the following:      Requested Prescriptions   Pending Prescriptions Disp Refills     sertraline (ZOLOFT) 100 MG tablet 135 tablet 3     Sig: Take 1.5 tablets (150 mg) by mouth daily       SSRIs Protocol Passed - 11/9/2022  9:16 AM        Passed - PHQ-9 score less than 5 in past 6 months     Please review last PHQ-9 score.           Passed - Medication is active on med list        Passed - Patient is age 18 or older        Passed - No active pregnancy on record        Passed - No positive pregnancy test in last 12 months        Passed - Recent (6 mo) or future (30 days) visit within the authorizing provider's specialty     Patient had office visit in the last 6 months or has a visit in the next 30 days with authorizing provider or within the authorizing provider's specialty.  See \"Patient Info\" tab in inbasket, or \"Choose Columns\" in Meds & Orders section of the refill encounter.                 Last Prescription Date:   10/28/2022  Last Fill Qty/Refills:         180, R-3      Prema Clay RN  ....................  11/11/2022   3:52 PM      "

## 2022-11-28 DIAGNOSIS — Z32.01 PREGNANCY TEST POSITIVE: ICD-10-CM

## 2022-11-29 NOTE — TELEPHONE ENCOUNTER
Last Prescription Date: 9/17/21   Last Qty/Refills: 90 / 3  Last Office Visit: 10/28/22   Future Office Visit: none    Corinne R Thayer, RN on 11/29/2022 at 10:32 AM     Requested Prescriptions   Pending Prescriptions Disp Refills     Prenatal Vit-Fe Fumarate-FA (PRENATAL MULTIVITAMIN W/IRON) 27-0.8 MG tablet 90 tablet 3     Sig: Take 1 tablet by mouth daily       There is no refill protocol information for this order

## 2022-11-30 RX ORDER — PRENATAL VIT/IRON FUM/FOLIC AC 27MG-0.8MG
1 TABLET ORAL DAILY
Qty: 90 TABLET | Refills: 3 | Status: SHIPPED | OUTPATIENT
Start: 2022-11-30 | End: 2023-11-07

## 2022-12-05 ENCOUNTER — MYC MEDICAL ADVICE (OUTPATIENT)
Dept: FAMILY MEDICINE | Facility: OTHER | Age: 42
End: 2022-12-05

## 2022-12-05 DIAGNOSIS — F41.9 MODERATE ANXIETY: ICD-10-CM

## 2022-12-05 DIAGNOSIS — F33.1 MODERATE EPISODE OF RECURRENT MAJOR DEPRESSIVE DISORDER (H): ICD-10-CM

## 2022-12-05 RX ORDER — SERTRALINE HYDROCHLORIDE 100 MG/1
200 TABLET, FILM COATED ORAL DAILY
Qty: 180 TABLET | Refills: 3 | Status: SHIPPED | OUTPATIENT
Start: 2022-12-05 | End: 2023-11-22

## 2022-12-05 NOTE — TELEPHONE ENCOUNTER
[facility] the [facility] [place] as patient hasn't been able to fill her Zoloft because it has been filling in [facility] and cannot fill until the [facility] in [place] takes it off the shelf for the patient..    Called the [facility] in [place] to see if they could  Take it off the shelf so [facility] here can refill.  Called the  [facility] in [place] and they stated it is now processing.  Will let patient know.  ..Avery Hansen LPN on 12/5/2022 at 3:49 PM

## 2022-12-26 ENCOUNTER — ALLIED HEALTH/NURSE VISIT (OUTPATIENT)
Dept: FAMILY MEDICINE | Facility: OTHER | Age: 42
End: 2022-12-26
Attending: NURSE PRACTITIONER
Payer: COMMERCIAL

## 2022-12-26 ENCOUNTER — MYC MEDICAL ADVICE (OUTPATIENT)
Dept: FAMILY MEDICINE | Facility: OTHER | Age: 42
End: 2022-12-26

## 2022-12-26 DIAGNOSIS — Z20.822 SUSPECTED 2019 NOVEL CORONAVIRUS INFECTION: ICD-10-CM

## 2022-12-26 DIAGNOSIS — Z20.822 EXPOSURE TO 2019 NOVEL CORONAVIRUS: Primary | ICD-10-CM

## 2022-12-26 LAB — SARS-COV-2 RNA RESP QL NAA+PROBE: NEGATIVE

## 2022-12-26 PROCEDURE — C9803 HOPD COVID-19 SPEC COLLECT: HCPCS

## 2022-12-26 PROCEDURE — U0003 INFECTIOUS AGENT DETECTION BY NUCLEIC ACID (DNA OR RNA); SEVERE ACUTE RESPIRATORY SYNDROME CORONAVIRUS 2 (SARS-COV-2) (CORONAVIRUS DISEASE [COVID-19]), AMPLIFIED PROBE TECHNIQUE, MAKING USE OF HIGH THROUGHPUT TECHNOLOGIES AS DESCRIBED BY CMS-2020-01-R: HCPCS | Mod: ZL

## 2022-12-28 ENCOUNTER — ALLIED HEALTH/NURSE VISIT (OUTPATIENT)
Dept: FAMILY MEDICINE | Facility: OTHER | Age: 42
End: 2022-12-28
Attending: PHYSICIAN ASSISTANT
Payer: COMMERCIAL

## 2022-12-28 DIAGNOSIS — R05.9 COUGH: Primary | ICD-10-CM

## 2022-12-28 LAB — SARS-COV-2 RNA RESP QL NAA+PROBE: POSITIVE

## 2022-12-28 PROCEDURE — U0005 INFEC AGEN DETEC AMPLI PROBE: HCPCS | Mod: ZL

## 2022-12-28 PROCEDURE — C9803 HOPD COVID-19 SPEC COLLECT: HCPCS

## 2023-02-27 ENCOUNTER — MYC MEDICAL ADVICE (OUTPATIENT)
Dept: FAMILY MEDICINE | Facility: OTHER | Age: 43
End: 2023-02-27
Payer: COMMERCIAL

## 2023-02-27 ENCOUNTER — OFFICE VISIT (OUTPATIENT)
Dept: OBGYN | Facility: OTHER | Age: 43
End: 2023-02-27
Payer: COMMERCIAL

## 2023-02-27 VITALS
SYSTOLIC BLOOD PRESSURE: 120 MMHG | HEART RATE: 88 BPM | BODY MASS INDEX: 40.41 KG/M2 | WEIGHT: 235.4 LBS | OXYGEN SATURATION: 96 % | DIASTOLIC BLOOD PRESSURE: 74 MMHG

## 2023-02-27 DIAGNOSIS — N89.8 VAGINAL DISCHARGE: ICD-10-CM

## 2023-02-27 DIAGNOSIS — R35.0 URINARY FREQUENCY: ICD-10-CM

## 2023-02-27 DIAGNOSIS — Z11.3 ROUTINE SCREENING FOR STI (SEXUALLY TRANSMITTED INFECTION): ICD-10-CM

## 2023-02-27 DIAGNOSIS — O20.9 BLEEDING IN EARLY PREGNANCY: Primary | ICD-10-CM

## 2023-02-27 LAB
ALBUMIN UR-MCNC: NEGATIVE MG/DL
APPEARANCE UR: CLEAR
BILIRUB UR QL STRIP: NEGATIVE
C TRACH DNA SPEC QL PROBE+SIG AMP: NEGATIVE
CLUE CELLS: NORMAL
COLOR UR AUTO: YELLOW
GLUCOSE UR STRIP-MCNC: NEGATIVE MG/DL
HCG INTACT+B SERPL-ACNC: 100 MIU/ML
HGB UR QL STRIP: ABNORMAL
HOLD SPECIMEN: NORMAL
KETONES UR STRIP-MCNC: NEGATIVE MG/DL
LEUKOCYTE ESTERASE UR QL STRIP: NEGATIVE
MUCOUS THREADS #/AREA URNS LPF: PRESENT /LPF
N GONORRHOEA DNA SPEC QL NAA+PROBE: NEGATIVE
NITRATE UR QL: NEGATIVE
PH UR STRIP: 7 [PH] (ref 5–9)
PROGEST SERPL-MCNC: 9.1 NG/ML
RBC URINE: 1 /HPF
SP GR UR STRIP: 1.02 (ref 1–1.03)
SQUAMOUS EPITHELIAL: 3 /HPF
TRICHOMONAS, WET PREP: NORMAL
UROBILINOGEN UR STRIP-MCNC: NORMAL MG/DL
WBC URINE: <1 /HPF
WBC'S/HIGH POWER FIELD, WET PREP: NORMAL
YEAST, WET PREP: NORMAL

## 2023-02-27 PROCEDURE — 87389 HIV-1 AG W/HIV-1&-2 AB AG IA: CPT | Mod: ZL

## 2023-02-27 PROCEDURE — 87210 SMEAR WET MOUNT SALINE/INK: CPT | Mod: ZL

## 2023-02-27 PROCEDURE — 84702 CHORIONIC GONADOTROPIN TEST: CPT | Mod: ZL

## 2023-02-27 PROCEDURE — 87522 HEPATITIS C REVRS TRNSCRPJ: CPT | Mod: ZL

## 2023-02-27 PROCEDURE — 36415 COLL VENOUS BLD VENIPUNCTURE: CPT | Mod: ZL

## 2023-02-27 PROCEDURE — 84144 ASSAY OF PROGESTERONE: CPT | Mod: ZL

## 2023-02-27 PROCEDURE — 99214 OFFICE O/P EST MOD 30 MIN: CPT

## 2023-02-27 PROCEDURE — 86780 TREPONEMA PALLIDUM: CPT | Mod: ZL

## 2023-02-27 PROCEDURE — 87591 N.GONORRHOEAE DNA AMP PROB: CPT | Mod: ZL

## 2023-02-27 PROCEDURE — G0463 HOSPITAL OUTPT CLINIC VISIT: HCPCS

## 2023-02-27 PROCEDURE — 81001 URINALYSIS AUTO W/SCOPE: CPT | Mod: ZL

## 2023-02-27 PROCEDURE — 87491 CHLMYD TRACH DNA AMP PROBE: CPT | Mod: ZL

## 2023-02-27 ASSESSMENT — PAIN SCALES - GENERAL: PAINLEVEL: NO PAIN (0)

## 2023-02-27 NOTE — PROGRESS NOTES
CC: positive pregnancy test with cramping and spotting and vaginal discharge.   HPI:  Hodan is a 42 year old female who presents for positive pregnancy test with cramping and spotting. She states she has taken 5 pregnant positive pregnancy test at home.  She thinks that she is about 4 weeks along.  She reports that her cramping is sort of like period cramps.  She declines cramping being one-sided. She also notes that she has vaginal discharge that has an odor that has lasted about a week. She says it is a slimy type brownish color.  She notes that she also has some bladder symptoms such as urinary frequency but denies any dysuria.  She is requesting STI testing today as well as she is unsure about her partners STI status. She would like swabs as well as blood work done.  She denies any lesions so declines HSV testing today.  She is O+ blood type.  This would be her second pregnancy.  She states that she had been taking birth control pills.    No LMP recorded.  Pap Smears:  Mammograms: due following delivery if pregnant     OB History    Para Term  AB Living   1 1 1 0 0 1   SAB IAB Ectopic Multiple Live Births   0 0 0 0 1      # Outcome Date GA Lbr Rommel/2nd Weight Sex Delivery Anes PTL Lv   1 Term 22 39w3d  4.023 kg (8 lb 13.9 oz) M CS-LTranv Spinal N PATRICIA      Name: LAUREMALE-HODAN      Apgar1: 7  Apgar5: 8     Past Medical History:   Diagnosis Date     Anxiety disorder     No Comments Provided     Depressive disorder      Gastro-esophageal reflux disease without esophagitis     No Comments Provided     Hypothyroidism     No Comments Provided     Infertility, female     Never had a full work up, thought she may have PCOS     Migraines      Varicella     as a child       Current Outpatient Medications   Medication     levothyroxine (SYNTHROID/LEVOTHROID) 175 MCG tablet     metFORMIN (GLUCOPHAGE) 500 MG tablet     norethindrone (MICRONOR) 0.35 MG tablet     Prenatal Vit-Fe Fumarate-FA  (PRENATAL MULTIVITAMIN W/IRON) 27-0.8 MG tablet     sertraline (ZOLOFT) 100 MG tablet     No current facility-administered medications for this visit.     No Known Allergies  /74   Pulse 88   Wt 106.8 kg (235 lb 6.4 oz)   SpO2 96%   BMI 40.41 kg/m      REVIEW OF SYSTEMS  As Per HPI, Otherwise negative.     Exam:  Constitutional: healthy, alert and no distress  Pelvic: Normal BUSE, vulva appears normal, vaginal is edematous and tender and cervix is normal. Small brown discharge noted. Wet prep and gonorrhea chlamydia testing obtained. Nontender to palpation of lower abdomen. Chaperone was present.     Lab: No results found for any visits on 02/27/23.    ASSESSMENT/PLAN :  (O20.9) Bleeding in early pregnancy  Plan: HCG quantitative pregnancy, Progesterone        Discussed with patient that bleeding and cramping can be normal in early pregnancy as cervix is very vascular.  Exam is normal today.  Recommended that if pain becomes one-sided and severe that she present to the emergency department for evaluation.  Will notify of hCG quantitative results.  We will have patient schedule intake as needed based on these results.  Discussed that sometimes bleeding can be indicative of miscarriage. We also discussed that progesterone and hCG would get a better idea of current status of pregnancy.  She is in agreement with this plan.    (Z11.3) Routine screening for STI (sexually transmitted infection)  (primary encounter diagnosis)  Plan: Treponema Ab w Reflex to RPR and Titer, HIV         Antigen Antibody Combo, Hepatitis C RNA,         Quantitative, GC/Chlamydia by PCR, CANCELED:         GC/Chlamydia by PCR  We will notify patient of results when they come in.     (R35.0) Urinary frequency  Plan: UA with Microscopic reflex to Culture        We will notify patient of results and treat as needed due to urinary frequency as well as pelvic cramping.    (N89.8) Vaginal discharge  Plan: Wet Prep, Genital      Yanelis CHANDLER  JOSE JUAN Valverde CNP  10:01 AM 2/27/2023

## 2023-02-27 NOTE — NURSING NOTE
Patient presents to clinic for concerns of pregnancy and cramping and bleeding  patient states she would also like to be tested for yeast infection and STD  /74   Pulse 88   Wt 106.8 kg (235 lb 6.4 oz)   SpO2 96%   BMI 40.41 kg/m    Ashley Alfaro LPN on 2/27/2023 at 9:46 AM

## 2023-02-28 LAB
HIV 1+2 AB+HIV1 P24 AG SERPL QL IA: NONREACTIVE
T PALLIDUM AB SER QL: NONREACTIVE

## 2023-03-01 ENCOUNTER — LAB (OUTPATIENT)
Dept: LAB | Facility: OTHER | Age: 43
End: 2023-03-01
Payer: COMMERCIAL

## 2023-03-01 DIAGNOSIS — O20.9 BLEEDING IN EARLY PREGNANCY: ICD-10-CM

## 2023-03-01 LAB
HCG INTACT+B SERPL-ACNC: 309 MIU/ML
HCV RNA SERPL NAA+PROBE-ACNC: NOT DETECTED IU/ML

## 2023-03-01 PROCEDURE — 84702 CHORIONIC GONADOTROPIN TEST: CPT | Mod: ZL

## 2023-03-01 PROCEDURE — 36415 COLL VENOUS BLD VENIPUNCTURE: CPT | Mod: ZL

## 2023-03-06 ENCOUNTER — VIRTUAL VISIT (OUTPATIENT)
Dept: OBGYN | Facility: OTHER | Age: 43
End: 2023-03-06
Attending: STUDENT IN AN ORGANIZED HEALTH CARE EDUCATION/TRAINING PROGRAM
Payer: COMMERCIAL

## 2023-03-06 DIAGNOSIS — O36.80X0 ENCOUNTER TO DETERMINE FETAL VIABILITY OF PREGNANCY, SINGLE OR UNSPECIFIED FETUS: Primary | ICD-10-CM

## 2023-03-06 PROCEDURE — 99207 PR OB VISIT-NO CHARGE - GICH ONLY: CPT | Mod: 93

## 2023-03-06 ASSESSMENT — PATIENT HEALTH QUESTIONNAIRE - PHQ9: SUM OF ALL RESPONSES TO PHQ QUESTIONS 1-9: 4

## 2023-03-06 NOTE — PROGRESS NOTES
Verbal consent obtained for telephone visit. Total length of call: 20 min    HPI:    This is a 42 year old female patient,  who was called today for OB Intake visit. Patient reports positive pregnancy test at home.     Obstetrical history and OB Demographics updated to the best of this nurse's ability based on patient report. PHQ-9 depression screening and routine Domestic Abuse screening completed. All immediate questions and concerns answered.    FOOD SECURITY SCREENING QUESTIONS  Hunger Vital Signs:  Within the past 12 months we worried whether our food would run out before we got money to buy more. Never  Within the past 12 months the food we bought just didn't last and we didn't have money to get more. Never    Last menstrual period is reported as Patient's last menstrual period was 2023. ELEN based on LMP is Estimated Date of Delivery: Oct 29, 2023.  Her cycles are regular.  Her last menstrual period was normal.   Since her LMP, she has experienced  nausea, emesis, headache, urinary urgency, urinary frequency and vaginal bleeding.       OBSTETRIC HISTORY:    OB History    Para Term  AB Living   2 1 1 0 0 1   SAB IAB Ectopic Multiple Live Births   0 0 0 0 1      # Outcome Date GA Lbr Rommel/2nd Weight Sex Delivery Anes PTL Lv   2 Current            1 Term 22 39w3d  4.023 kg (8 lb 13.9 oz) M CS-LTranv Spinal N PATRICIA      Name: SILVIA KELSEY      Apgar1: 7  Apgar5: 8       Age of first pregnancy: 41  Previous OB Provider: Dr. Keller  Previous Delivering Clinic: [facility]  Release of Records: n/a    Current delivery plan: [facility]  Preferred OB Provider: Dr. Keller  Current Primary Care Provider: Toña Beckham PA-C  Pediatrician: Dr. Calhoun    Additional History: None     Have you travelled during the pregnancy?No  Have your sexual partner(s) travelled during the pregnancy?No      HISTORY:   Planned Pregnancy: No  Marital Status: Single  Occupation: [employer]   Living in Household:  Children Only, Parents/Siblings and Other Relatives    Father of the baby is involved.   Family and father of baby is supportive of current pregnancy.  Past Medical History of Father of Baby:No significant medical history    Past History:  Her past medical history   Past Medical History:   Diagnosis Date     Anxiety disorder     No Comments Provided     Depressive disorder      Gastro-esophageal reflux disease without esophagitis     No Comments Provided     Hypothyroidism     No Comments Provided     Infertility, female     Never had a full work up, thought she may have PCOS     Migraines      Varicella     as a child   .      Her past surgical history:   Past Surgical History:   Procedure Laterality Date      SECTION N/A 2022    Procedure:  SECTION;  Surgeon: Paulette Arrieta MD;  Location: [facility] OR     CHOLECYSTECTOMY      No Comments Provided       She has a history of  prior  section    Since her last LMP she admits to the use of Alcohol .    Pap smear history: Last Pap May 2019 next due .    STD/STI history: Chlamydia    STD/STI symptoms: no noticeable symptoms     Past medical, surgical, social and family history were reviewed and updated in EPIC.    Medications reviewed by this nurse. Current medication list:  Current Outpatient Medications   Medication Sig Dispense Refill     levothyroxine (SYNTHROID/LEVOTHROID) 175 MCG tablet Take 1 tablet (175 mcg) by mouth daily 90 tablet 1     metFORMIN (GLUCOPHAGE) 500 MG tablet TAKE 1 TABLET(500 MG) BY MOUTH BID with meals 180 tablet 1     Prenatal Vit-Fe Fumarate-FA (PRENATAL MULTIVITAMIN W/IRON) 27-0.8 MG tablet Take 1 tablet by mouth daily 90 tablet 3     sertraline (ZOLOFT) 100 MG tablet Take 2 tablets (200 mg) by mouth daily 180 tablet 3     norethindrone (MICRONOR) 0.35 MG tablet Take 1 tablet (0.35 mg) by mouth daily (Patient not taking: Reported on 3/6/2023) 84 tablet 4     The following medications were recommended to be  discontinued due to Pregnancy Category D status: MICRONOR  Patient informed to contact her primary care provider as soon as possible to discuss a safer alternative.    Risk factors:  Moderate and moderately severe risks (consult with OB/Gyn)  Previous fetal or  demise: No  History of  delivery: No  History of heart disease Class I: No  Severe anemia, unresponsive to iron therapy: No  Pelvic mass or neoplasm: No  Previous : Yes  Hyper/hypothyroidism: Yes  History of postpartum hemorrhage requiring transfusion:No  History of Placenta Accreta: No    High Risk (Pregnancy managed by OB/Gyn)  Multiple pregnancy: No  Pre-gestational diabetes: No  Chronic Hypertension: No  Renal Failure: No  Heart disease, class II or greater: No  Rh Isoimmunization: No  Chronic active hepatitis: No  Convulsive disorder, poorly controlled: No  Isoimmune thrombocytopenia: No  Pre-term premature rupture of membranes: No  Lupus or other autoimmune disorder: No  Human Immunodeficiency Virus: No      ASSESSMENT/PLAN:       ICD-10-CM    1. Encounter to determine fetal viability of pregnancy, single or unspecified fetus  O36.80X0           42 year old , 6w1d of pregnancy with ELEN of 10/29/2023, by Last Menstrual Period    Per standing orders and scope of practice of this nurse, patient will have the following orders placed and completed prior to initial OB visit with the appropriate provider:    --early ultrasound for dating and viability ordered for 6+ weeks gestation based on LMP    --Quantitative Beta HCG and progesterone monitoring if indicated    Counseling given:     - Recommended weight gain for pregnancy: < 15 lbs.   BMI < 18.5  28-40 lbs   18.5 - 24.9 25-35   25 - 29.9 15-25   > 30  < 15       PLAN/PATIENT INSTRUCTIONS:    Normal exercise.  Normal sexual activity.  Prenatal vitamins.  Anticipated weight gain.    follow-up appointment with Dr. Keller for pre- care and take multivitamin or pre-  vitamins    Hallie Epstein RN.................................................. 3/6/2023 8:57 AM

## 2023-03-14 ASSESSMENT — ANXIETY QUESTIONNAIRES
2. NOT BEING ABLE TO STOP OR CONTROL WORRYING: SEVERAL DAYS
6. BECOMING EASILY ANNOYED OR IRRITABLE: SEVERAL DAYS
IF YOU CHECKED OFF ANY PROBLEMS ON THIS QUESTIONNAIRE, HOW DIFFICULT HAVE THESE PROBLEMS MADE IT FOR YOU TO DO YOUR WORK, TAKE CARE OF THINGS AT HOME, OR GET ALONG WITH OTHER PEOPLE: NOT DIFFICULT AT ALL
7. FEELING AFRAID AS IF SOMETHING AWFUL MIGHT HAPPEN: SEVERAL DAYS
4. TROUBLE RELAXING: SEVERAL DAYS
1. FEELING NERVOUS, ANXIOUS, OR ON EDGE: SEVERAL DAYS
7. FEELING AFRAID AS IF SOMETHING AWFUL MIGHT HAPPEN: SEVERAL DAYS
8. IF YOU CHECKED OFF ANY PROBLEMS, HOW DIFFICULT HAVE THESE MADE IT FOR YOU TO DO YOUR WORK, TAKE CARE OF THINGS AT HOME, OR GET ALONG WITH OTHER PEOPLE?: NOT DIFFICULT AT ALL
GAD7 TOTAL SCORE: 7
3. WORRYING TOO MUCH ABOUT DIFFERENT THINGS: SEVERAL DAYS
5. BEING SO RESTLESS THAT IT IS HARD TO SIT STILL: SEVERAL DAYS

## 2023-03-14 ASSESSMENT — PATIENT HEALTH QUESTIONNAIRE - PHQ9
10. IF YOU CHECKED OFF ANY PROBLEMS, HOW DIFFICULT HAVE THESE PROBLEMS MADE IT FOR YOU TO DO YOUR WORK, TAKE CARE OF THINGS AT HOME, OR GET ALONG WITH OTHER PEOPLE: NOT DIFFICULT AT ALL
SUM OF ALL RESPONSES TO PHQ QUESTIONS 1-9: 2
SUM OF ALL RESPONSES TO PHQ QUESTIONS 1-9: 2

## 2023-03-15 ENCOUNTER — OFFICE VISIT (OUTPATIENT)
Dept: INTERNAL MEDICINE | Facility: OTHER | Age: 43
End: 2023-03-15
Payer: COMMERCIAL

## 2023-03-15 ENCOUNTER — MYC MEDICAL ADVICE (OUTPATIENT)
Dept: INTERNAL MEDICINE | Facility: OTHER | Age: 43
End: 2023-03-15
Payer: COMMERCIAL

## 2023-03-15 VITALS
OXYGEN SATURATION: 98 % | WEIGHT: 223.13 LBS | DIASTOLIC BLOOD PRESSURE: 70 MMHG | BODY MASS INDEX: 37.18 KG/M2 | TEMPERATURE: 97.8 F | RESPIRATION RATE: 20 BRPM | HEART RATE: 81 BPM | SYSTOLIC BLOOD PRESSURE: 124 MMHG | HEIGHT: 65 IN

## 2023-03-15 DIAGNOSIS — R07.0 THROAT PAIN: Primary | ICD-10-CM

## 2023-03-15 DIAGNOSIS — H61.23 BILATERAL IMPACTED CERUMEN: ICD-10-CM

## 2023-03-15 LAB
FLUAV RNA SPEC QL NAA+PROBE: NEGATIVE
FLUBV RNA RESP QL NAA+PROBE: NEGATIVE
GROUP A STREP BY PCR: NOT DETECTED
RSV RNA SPEC NAA+PROBE: NEGATIVE
SARS-COV-2 RNA RESP QL NAA+PROBE: NEGATIVE

## 2023-03-15 PROCEDURE — 87637 SARSCOV2&INF A&B&RSV AMP PRB: CPT | Mod: ZL

## 2023-03-15 PROCEDURE — C9803 HOPD COVID-19 SPEC COLLECT: HCPCS

## 2023-03-15 PROCEDURE — 99213 OFFICE O/P EST LOW 20 MIN: CPT | Mod: CS

## 2023-03-15 PROCEDURE — G0463 HOSPITAL OUTPT CLINIC VISIT: HCPCS

## 2023-03-15 PROCEDURE — 87651 STREP A DNA AMP PROBE: CPT | Mod: ZL

## 2023-03-15 ASSESSMENT — ENCOUNTER SYMPTOMS
ABDOMINAL PAIN: 0
NAUSEA: 1
HEADACHES: 1
COUGH: 0
HEARTBURN: 1
DIARRHEA: 1
FEVER: 0
SHORTNESS OF BREATH: 0
SORE THROAT: 1
CHILLS: 0

## 2023-03-15 ASSESSMENT — PATIENT HEALTH QUESTIONNAIRE - PHQ9
SUM OF ALL RESPONSES TO PHQ QUESTIONS 1-9: 2
10. IF YOU CHECKED OFF ANY PROBLEMS, HOW DIFFICULT HAVE THESE PROBLEMS MADE IT FOR YOU TO DO YOUR WORK, TAKE CARE OF THINGS AT HOME, OR GET ALONG WITH OTHER PEOPLE: NOT DIFFICULT AT ALL

## 2023-03-15 ASSESSMENT — PAIN SCALES - GENERAL: PAINLEVEL: MILD PAIN (2)

## 2023-03-15 ASSESSMENT — ANXIETY QUESTIONNAIRES: GAD7 TOTAL SCORE: 7

## 2023-03-15 NOTE — PROGRESS NOTES
Assessment & Plan   Hodan Ingiuez is a 42 year old presenting for the following health issues:      ICD-10-CM    1. Throat pain  R07.0 Symptomatic Influenza A/B, RSV, & SARS-CoV2 PCR (COVID-19) Nose     Group A Streptococcus PCR Throat Swab     Symptomatic Influenza A/B, RSV, & SARS-CoV2 PCR (COVID-19) Nose      2. Bilateral impacted cerumen  H61.23         Strep test, COVID, RSV, influenza all negative today.  Discussed possible etiologies for sore throat.  She does state that her acid reflux has been worse since finding out she has been pregnant, she takes Tums for this.  It is very likely that this is refluxing into her throat causing irritation.  She also has chronic postnasal drainage, this also could be contributing to her throat irritation.  She also has had increased headaches and nausea, suspect this is pregnancy related.  She will be following up with her OB/GYN shortly, she is able to eat and drink without difficulty.  She has bilateral impacted cerumen, ear flushes provided by nurse today in clinic.  If symptoms persist after pregnancy, she may need a further work-up.  Patient verbalizes understanding and agreement to this.    No follow-ups on file.    JOSE JUAN Casillas [facility]      Subjective   Hodan is a 42 year old, presenting for the following health issues:  Pain (Sore throat for a week and headaches a little longer than that.   Tiffany Lucas LPN on 3/15/2023 at 1:46 PM//)      Pain  This is a new problem. The current episode started 1 to 4 weeks ago. The problem occurs constantly. The problem has been unchanged. Associated symptoms include headaches, nausea and a sore throat. Pertinent negatives include no abdominal pain, chest pain, chills, congestion, coughing or fever. She has tried acetaminophen and rest for the symptoms.   History of Present Illness       Headaches:   Since the patient's last clinic visit, headaches are: worsened  The patient is  "getting headaches:  Have one almost all day  She is able to do normal daily activities when she has a migraine.  The patient is taking the following rescue/relief medications:  Tylenol   Patient states \"I get only a small amount of relief\" from the rescue/relief medications.   The patient is taking the following medications to prevent migraines:  No medications to prevent migraines  In the past 4 weeks, the patient has gone to an Urgent Care or Emergency Room 0 times times due to headaches.    Reason for visit:  Sore throat and bad headaches at 7 weeka pregnant  Symptom onset:  3-7 days ago  Symptoms include:  Sore throat and bad headaches  Symptom intensity:  Severe  Symptom progression:  Worsening  Had these symptoms before:  Yes  Has tried/received treatment for these symptoms:  Yes  Previous treatment was successful:  Yes  Prior treatment description:  Pill for migraine i cant take while pregnant  What makes it worse:  Strong smells , bright lights, loud noises  What makes it better:  Sleep which i cant get much of    She eats 2-3 servings of fruits and vegetables daily.She consumes 2 sweetened beverage(s) daily.She exercises with enough effort to increase her heart rate 9 or less minutes per day.  She exercises with enough effort to increase her heart rate 3 or less days per week.   She is taking medications regularly.    Today's PHQ-9         PHQ-9 Total Score: 2    PHQ-9 Q9 Thoughts of better off dead/self-harm past 2 weeks :   Not at all    How difficult have these problems made it for you to do your work, take care of things at home, or get along with other people: Not difficult at all  Today's JUAN CARLOS-7 Score: 7       Acute Illness  Acute illness concerns: sore throat and headaches  Onset/Duration: over a week  Symptoms:  Fever: No  Chills/Sweats: no  Headache (location?): YES  Sinus Pressure: No  Conjunctivitis:  No  Ear Pain: no  Rhinorrhea: No  Congestion: No  Sore Throat: YES  Cough: no  Wheeze: " "No  Decreased Appetite: No  Nausea: No  Vomiting: No  Diarrhea: No  Dysuria/Freq.: No  Dysuria or Hematuria: No  Fatigue/Achiness: YES  Sick/Strep Exposure: No  Therapies tried and outcome:   None    Review of Systems   Constitutional: Negative for chills and fever.   HENT: Positive for postnasal drip and sore throat. Negative for congestion and ear pain.    Respiratory: Negative for cough and shortness of breath.    Cardiovascular: Negative for chest pain.   Gastrointestinal: Positive for diarrhea ( chronic), heartburn and nausea. Negative for abdominal pain.   Neurological: Positive for headaches.   All other systems reviewed and are negative.     Constitutional, HEENT, cardiovascular, pulmonary, gi and gu systems are negative, except as otherwise noted.      Objective    /70 (BP Location: Right arm, Patient Position: Sitting, Cuff Size: Adult Large)   Pulse 81   Temp 97.8  F (36.6  C)   Resp 20   Ht 1.641 m (5' 4.6\")   Wt 101.2 kg (223 lb 2 oz)   LMP 01/22/2023   SpO2 98%   BMI 37.59 kg/m    Body mass index is 37.59 kg/m .  Physical Exam  Vitals reviewed.   Constitutional:       General: She is not in acute distress.     Appearance: She is well-developed.   HENT:      Head: Normocephalic and atraumatic.      Right Ear: Tympanic membrane, ear canal and external ear normal. There is impacted cerumen.      Left Ear: Tympanic membrane, ear canal and external ear normal. There is impacted cerumen.      Nose: Nose normal. No congestion.      Mouth/Throat:      Pharynx: No oropharyngeal exudate or posterior oropharyngeal erythema.   Eyes:      General: No scleral icterus.     Conjunctiva/sclera: Conjunctivae normal.      Pupils: Pupils are equal, round, and reactive to light.   Neck:      Thyroid: No thyromegaly.   Cardiovascular:      Rate and Rhythm: Normal rate and regular rhythm.      Heart sounds: No murmur heard.  Pulmonary:      Effort: Pulmonary effort is normal. No respiratory distress.      " Breath sounds: Normal breath sounds. No wheezing.   Musculoskeletal:         General: No tenderness or deformity. Normal range of motion.      Cervical back: Normal range of motion and neck supple.   Lymphadenopathy:      Cervical: No cervical adenopathy.   Skin:     General: Skin is warm and dry.      Findings: No rash.   Neurological:      Mental Status: She is alert and oriented to person, place, and time.      Cranial Nerves: No cranial nerve deficit.      Coordination: Coordination normal.   Psychiatric:         Behavior: Behavior normal.         Thought Content: Thought content normal.         Judgment: Judgment normal.        Results for orders placed or performed in visit on 03/15/23   Symptomatic Influenza A/B, RSV, & SARS-CoV2 PCR (COVID-19) Nose     Status: Normal    Specimen: Nose; Swab   Result Value Ref Range    Influenza A PCR Negative Negative    Influenza B PCR Negative Negative    RSV PCR Negative Negative    SARS CoV2 PCR Negative Negative    Narrative    Testing was performed using the Xpert Xpress CoV2/Flu/RSV Assay on the [employer]pert Instrument. This test should be ordered for the detection of SARS-CoV-2, influenza, and RSV viruses in individuals who meet clinical and/or epidemiological criteria. Test performance is unknown in asymptomatic patients. This test is for in vitro diagnostic use under the FDA EUA for laboratories certified under CLIA to perform high or moderate complexity testing. This test has not been FDA cleared or approved. A negative result does not rule out the presence of PCR inhibitors in the specimen or target RNA in concentration below the limit of detection for the assay. If only one viral target is positive but coinfection with multiple targets is suspected, the sample should be re-tested with another FDA cleared, approved, or authorized test, if coinfection would change clinical management. This test was validated by the [facility]. These  laboratories are certified under the Clinical Laboratory Improvement Amendments of 1988 (CLIA-88) as qualified to perform high complexity laboratory testing.   Group A Streptococcus PCR Throat Swab     Status: Normal    Specimen: Throat; Swab   Result Value Ref Range    Group A strep by PCR Not Detected Not Detected    Narrative    The Xpert Xpress Strep A test, performed on the [employer], is a rapid, qualitative in vitro diagnostic test for the detection of Streptococcus pyogenes (Group A ß-hemolytic Streptococcus, Strep A) in throat swab specimens from patients with signs and symptoms of pharyngitis. The Xpert Xpress Strep A test can be used as an aid in the diagnosis of Group A Streptococcal pharyngitis. The assay is not intended to monitor treatment for Group A Streptococcus infections. The Xpert Xpress Strep A test utilizes an automated real-time polymerase chain reaction (PCR) to detect Streptococcus pyogenes DNA.

## 2023-03-27 ENCOUNTER — PRENATAL OFFICE VISIT (OUTPATIENT)
Dept: OBGYN | Facility: OTHER | Age: 43
End: 2023-03-27
Attending: STUDENT IN AN ORGANIZED HEALTH CARE EDUCATION/TRAINING PROGRAM
Payer: COMMERCIAL

## 2023-03-27 ENCOUNTER — HOSPITAL ENCOUNTER (OUTPATIENT)
Dept: ULTRASOUND IMAGING | Facility: OTHER | Age: 43
Discharge: HOME | End: 2023-03-27
Attending: STUDENT IN AN ORGANIZED HEALTH CARE EDUCATION/TRAINING PROGRAM
Payer: COMMERCIAL

## 2023-03-27 VITALS
WEIGHT: 230.9 LBS | HEART RATE: 104 BPM | DIASTOLIC BLOOD PRESSURE: 76 MMHG | HEIGHT: 65 IN | BODY MASS INDEX: 38.47 KG/M2 | SYSTOLIC BLOOD PRESSURE: 128 MMHG

## 2023-03-27 DIAGNOSIS — Z34.91 ENCOUNTER FOR PREGNANCY RELATED EXAMINATION IN FIRST TRIMESTER: Primary | ICD-10-CM

## 2023-03-27 DIAGNOSIS — E03.8 OTHER SPECIFIED HYPOTHYROIDISM: ICD-10-CM

## 2023-03-27 DIAGNOSIS — O36.80X0 ENCOUNTER TO DETERMINE FETAL VIABILITY OF PREGNANCY, SINGLE OR UNSPECIFIED FETUS: ICD-10-CM

## 2023-03-27 DIAGNOSIS — Z98.891 HISTORY OF C-SECTION: ICD-10-CM

## 2023-03-27 LAB
C TRACH DNA SPEC QL PROBE+SIG AMP: NEGATIVE
N GONORRHOEA DNA SPEC QL NAA+PROBE: NEGATIVE

## 2023-03-27 PROCEDURE — 76801 OB US < 14 WKS SINGLE FETUS: CPT

## 2023-03-27 PROCEDURE — 87591 N.GONORRHOEAE DNA AMP PROB: CPT | Mod: ZL | Performed by: STUDENT IN AN ORGANIZED HEALTH CARE EDUCATION/TRAINING PROGRAM

## 2023-03-27 PROCEDURE — 99213 OFFICE O/P EST LOW 20 MIN: CPT | Performed by: STUDENT IN AN ORGANIZED HEALTH CARE EDUCATION/TRAINING PROGRAM

## 2023-03-27 RX ORDER — PROMETHAZINE HYDROCHLORIDE 25 MG/1
25 TABLET ORAL EVERY 8 HOURS PRN
Qty: 30 TABLET | Refills: 0 | Status: SHIPPED | OUTPATIENT
Start: 2023-03-27 | End: 2023-12-22

## 2023-03-27 RX ORDER — PROCHLORPERAZINE MALEATE 10 MG
10 TABLET ORAL EVERY 8 HOURS PRN
Qty: 30 TABLET | Refills: 0 | Status: SHIPPED | OUTPATIENT
Start: 2023-03-27 | End: 2023-12-22

## 2023-03-27 NOTE — PROGRESS NOTES
New OB Visit    Chief Complaint: Establish care for a new pregnancy    History of Present Illness:  Ms. Hodan Iniguez is a 42 year old yo  here today for a new OB visit. She reports her LMP as Patient's last menstrual period was 2023. She is unsure of this date. She reports early pregnancy symptoms including nausea &/or vomiting. She IS taking prenatal vitamins at this time. We discussed some warning signs to be alert for that could suggest spontaneous miscarriage including vaginal bleeding, cramping as well as what symptoms should prompt medical evaluation in clinic or the ER.     We also discussed genetic screening including non-invasive testing, carrier screening for SMA and CF. All of these tests were offered to the family and they have decided NIPT at this time.     Medical History:  Past Medical History:   Diagnosis Date     Anxiety disorder     No Comments Provided     Depressive disorder      Gastro-esophageal reflux disease without esophagitis     No Comments Provided     Hypothyroidism     No Comments Provided     Infertility, female     Never had a full work up, thought she may have PCOS     Migraines      Varicella     as a child     She denies any chronic medical conditions: specifically denies asthma, HTN, DM    Obstetric History:  : PLTCS  G2: current    GYN History:  No history of STIs  Last pap smear: May 2019 NIL, HPV negative (due May 2024- postpartum)  No history of abnormal pap smears    Past Surgical History:  Past Surgical History:   Procedure Laterality Date      SECTION N/A 2022    Procedure:  SECTION;  Surgeon: Paulette Arrieta MD;  Location: [facility]     CHOLECYSTECTOMY      No Comments Provided       Family Medical History:  Family History   Problem Relation Age of Onset     Hypertension Mother         Hypertension     Mental Illness Mother         Mental illness,sees a therapist     Other - See Comments Mother         MTHFR     Cancer Mother          uterine/endometrial     Prostate Cancer Father         Cancer-prostate     Hypertension Father         Hypertension     Infertility Sister      Thyroid Disease Sister      Infertility Sister      Thyroid Disease Sister      Mental Illness Brother         Mental illness,anger, therapy     Cancer Paternal Grandmother 80        Cancer,lung cancer smoker     Specifically denies breast, ovarian, endometrial and colon cancers in her family  She also is not aware of any familial thrombophilias and coagulopathies in her family    Medications:  Current Outpatient Medications   Medication     levothyroxine (SYNTHROID/LEVOTHROID) 175 MCG tablet     metFORMIN (GLUCOPHAGE) 500 MG tablet     norethindrone (MICRONOR) 0.35 MG tablet     Prenatal Vit-Fe Fumarate-FA (PRENATAL MULTIVITAMIN W/IRON) 27-0.8 MG tablet     sertraline (ZOLOFT) 100 MG tablet     No current facility-administered medications for this visit.       Allergies:   No Known Allergies    Social History:  Social History     Tobacco Use     Smoking status: Never     Smokeless tobacco: Never   Vaping Use     Vaping Use: Never used     Passive vaping exposure: Yes   Substance Use Topics     Alcohol use: Not Currently     Alcohol/week: 24.0 standard drinks     Types: 24 Cans of beer per week     Drug use: Never     No tobacco, alcohol or drug use in this pregnancy    ROS:   Skin: negative for rash, bruising  Eyes: negative for visual blurring, double vision  Ears/Nose/Throat: negative for nasal congestion, vertigo  Respiratory: No shortness of breath, dyspnea on exertion, cough, or hemoptysis  Cardiovascular: negative for palpitations, chest pain, lower extremity edema and syncope or near-syncope  Gastrointestinal: negative for, nausea, vomiting and hematemesis  Genitourinary: negative for, dysuria, frequency and urgency  Musculoskeletal: negative for, back pain and muscular weakness  Neurologic: negative for, headaches, syncope, seizures and local  "weakness  Psychiatric: negative for, anxiety, depression and hallucinations  Hematologic/Lymphatic/Immunologic: negative for, anemia, chills and fever      Exam:  /76   Pulse 104   Ht 1.638 m (5' 4.5\")   Wt 104.7 kg (230 lb 14.4 oz)   LMP 2023   BMI 39.02 kg/m      General: No acute distress, well-appearing  Neck: Normal thyroid gland on palpation without nodules, enlargement or pain  Breast: Normal appearing skin on breasts bilaterally, No nodules or masses palpated, no nipple discharge or bleeding  Cardiac: Normal rate, regular rhythm, no murmurs, gallops or rubs, normal perfusion to extremities  Lungs: Clear on auscultation, no wheezing, non-labored respirations  Abdomen: Soft, non-tender, no masses  Pelvic exam: Normal appearing external genitalia, normal appearing vaginal walls with pink ruggae. No noted vaginal discharge or lesions. Cervix is closed without masses, nodules, erythema or discharge at the os.       Dating ultrasound: Done    ELEN based on LMP 10/29/2023- ELEN based on US 2023. Per ACOG recommendations these dates are more than 5 days different, thus should be changed to be based off the US. Final ELEN will be  based on 8 week US.    Assessment:  Ms. Hodan Iniguez is a 42 year old yo  here today to establish care for this pregnancy. Her pregnancy is complicated by AMA, history of prior , hypothyroidism    Plan:  # Routine Prenatal Care  -- Datin week US ELEN: 2023  -- PNLs: collect with Lab-only visit in 2 weeks   CBC   RPR   Hep B S Ag   Hep C   Rubella   HIV   ABO/Rh type   Antibody Screen    -- Genetic Screening: Discussed with patient, she has decided on NIPT. Prior negative carrier screening.  -- Anatomy US: Planned for approximately 20 weeks gestation: Level II  -- Immunizations: Plans for Tdap at approximately 27 weeks gestation  -- 3rd TM labs including CBC, RPR: Planned for after 27 weeks gestation  -- 1 hr GTT: Planned for  24-28 " weeks   -- GBS: Planned for 36 weeks  -- Postpartum Planning: To be discussed   -- Delivery Planning: No indication for early IOL at this time. To be discussed continually  -- Return to clinic in 4 weeks for OB follow up visit  -- Planning to do at next visit: Follow up labs    # Advanced maternal age  -- ASA 81 mg daily starting at 12 weeks  -- Level II US  -- NIPT planned    # History of   -- PLTCS for  Cat II FHT and being remote from delivery: 2022  -- Not a  candidate at our facility due to short interval pregnancy and no prior     # Hypothyroidism  -- Currently using levothyroxine: taking 175 mcg daily  -- TSH check each trimester    Sasha Keller MD  OB/GYN  3/27/2023 11:43 AM

## 2023-03-27 NOTE — NURSING NOTE
Pt presents to clinic today for OB physical.      Medication Reconciliation: complete  Trice Alfaro LPN

## 2023-04-09 LAB
ABO/RH(D): NORMAL
ANTIBODY SCREEN: NEGATIVE
SPECIMEN EXPIRATION DATE: NORMAL

## 2023-04-10 ENCOUNTER — LAB (OUTPATIENT)
Dept: LAB | Facility: OTHER | Age: 43
End: 2023-04-10
Attending: STUDENT IN AN ORGANIZED HEALTH CARE EDUCATION/TRAINING PROGRAM
Payer: COMMERCIAL

## 2023-04-10 DIAGNOSIS — Z34.91 ENCOUNTER FOR PREGNANCY RELATED EXAMINATION IN FIRST TRIMESTER: ICD-10-CM

## 2023-04-10 DIAGNOSIS — E03.8 OTHER SPECIFIED HYPOTHYROIDISM: ICD-10-CM

## 2023-04-10 LAB
ALBUMIN UR-MCNC: NEGATIVE MG/DL
APPEARANCE UR: CLEAR
BASOPHILS # BLD AUTO: 0 10E3/UL (ref 0–0.2)
BASOPHILS NFR BLD AUTO: 0 %
BILIRUB UR QL STRIP: NEGATIVE
COLOR UR AUTO: YELLOW
EOSINOPHIL # BLD AUTO: 0.2 10E3/UL (ref 0–0.7)
EOSINOPHIL NFR BLD AUTO: 2 %
ERYTHROCYTE [DISTWIDTH] IN BLOOD BY AUTOMATED COUNT: 13.8 % (ref 10–15)
GLUCOSE UR STRIP-MCNC: NEGATIVE MG/DL
HCT VFR BLD AUTO: 36.3 % (ref 35–47)
HGB BLD-MCNC: 12.1 G/DL (ref 11.7–15.7)
HGB UR QL STRIP: NEGATIVE
IMM GRANULOCYTES # BLD: 0 10E3/UL
IMM GRANULOCYTES NFR BLD: 0 %
KETONES UR STRIP-MCNC: NEGATIVE MG/DL
LEUKOCYTE ESTERASE UR QL STRIP: NEGATIVE
LYMPHOCYTES # BLD AUTO: 2.2 10E3/UL (ref 0.8–5.3)
LYMPHOCYTES NFR BLD AUTO: 23 %
MCH RBC QN AUTO: 29.7 PG (ref 26.5–33)
MCHC RBC AUTO-ENTMCNC: 33.3 G/DL (ref 31.5–36.5)
MCV RBC AUTO: 89 FL (ref 78–100)
MONOCYTES # BLD AUTO: 0.8 10E3/UL (ref 0–1.3)
MONOCYTES NFR BLD AUTO: 8 %
MUCOUS THREADS #/AREA URNS LPF: PRESENT /LPF
NEUTROPHILS # BLD AUTO: 6.2 10E3/UL (ref 1.6–8.3)
NEUTROPHILS NFR BLD AUTO: 67 %
NITRATE UR QL: NEGATIVE
NRBC # BLD AUTO: 0 10E3/UL
NRBC BLD AUTO-RTO: 0 /100
PH UR STRIP: 6.5 [PH] (ref 5–9)
PLATELET # BLD AUTO: 308 10E3/UL (ref 150–450)
RBC # BLD AUTO: 4.07 10E6/UL (ref 3.8–5.2)
RBC URINE: 1 /HPF
SP GR UR STRIP: 1.03 (ref 1–1.03)
SQUAMOUS EPITHELIAL: 2 /HPF
T4 FREE SERPL-MCNC: 1.58 NG/DL (ref 0.9–1.7)
TSH SERPL DL<=0.005 MIU/L-ACNC: 1.38 UIU/ML (ref 0.3–4.2)
UROBILINOGEN UR STRIP-MCNC: NORMAL MG/DL
WBC # BLD AUTO: 9.4 10E3/UL (ref 4–11)
WBC URINE: 1 /HPF

## 2023-04-10 PROCEDURE — 84443 ASSAY THYROID STIM HORMONE: CPT | Mod: ZL

## 2023-04-10 PROCEDURE — 87086 URINE CULTURE/COLONY COUNT: CPT | Mod: ZL

## 2023-04-10 PROCEDURE — 86762 RUBELLA ANTIBODY: CPT | Mod: ZL

## 2023-04-10 PROCEDURE — 81003 URINALYSIS AUTO W/O SCOPE: CPT | Mod: ZL

## 2023-04-10 PROCEDURE — 86803 HEPATITIS C AB TEST: CPT | Mod: ZL

## 2023-04-10 PROCEDURE — 84439 ASSAY OF FREE THYROXINE: CPT | Mod: ZL

## 2023-04-10 PROCEDURE — 85025 COMPLETE CBC W/AUTO DIFF WBC: CPT | Mod: ZL

## 2023-04-10 PROCEDURE — 86901 BLOOD TYPING SEROLOGIC RH(D): CPT | Mod: ZL

## 2023-04-10 PROCEDURE — 36415 COLL VENOUS BLD VENIPUNCTURE: CPT | Mod: ZL

## 2023-04-10 PROCEDURE — 87389 HIV-1 AG W/HIV-1&-2 AB AG IA: CPT | Mod: ZL

## 2023-04-10 PROCEDURE — 86780 TREPONEMA PALLIDUM: CPT | Mod: ZL

## 2023-04-10 PROCEDURE — 87340 HEPATITIS B SURFACE AG IA: CPT | Mod: ZL

## 2023-04-11 LAB
HBV SURFACE AG SERPL QL IA: NONREACTIVE
HCV AB SERPL QL IA: NONREACTIVE
HIV 1+2 AB+HIV1 P24 AG SERPL QL IA: NONREACTIVE
RUBV IGG SERPL QL IA: 2.29 INDEX
RUBV IGG SERPL QL IA: POSITIVE
T PALLIDUM AB SER QL: NONREACTIVE

## 2023-04-12 LAB — BACTERIA UR CULT: NORMAL

## 2023-04-17 ENCOUNTER — MYC MEDICAL ADVICE (OUTPATIENT)
Dept: OBGYN | Facility: OTHER | Age: 43
End: 2023-04-17
Payer: COMMERCIAL

## 2023-04-17 DIAGNOSIS — O28.5 ABNORMAL GENETIC TEST DURING PREGNANCY: Primary | ICD-10-CM

## 2023-04-17 NOTE — TELEPHONE ENCOUNTER
Dr. Sasha Keller to contact patient regarding results. Nasima Ortiz RN ....................  4/17/2023   2:59 PM

## 2023-04-18 ENCOUNTER — PRE VISIT (OUTPATIENT)
Dept: MATERNAL FETAL MEDICINE | Facility: CLINIC | Age: 43
End: 2023-04-18
Payer: COMMERCIAL

## 2023-04-18 ENCOUNTER — TELEPHONE (OUTPATIENT)
Dept: OBGYN | Facility: OTHER | Age: 43
End: 2023-04-18
Payer: COMMERCIAL

## 2023-04-18 ENCOUNTER — TRANSCRIBE ORDERS (OUTPATIENT)
Dept: MATERNAL FETAL MEDICINE | Facility: CLINIC | Age: 43
End: 2023-04-18
Payer: COMMERCIAL

## 2023-04-18 DIAGNOSIS — O35.11X1 MATERNAL CARE FOR (SUSPECTED) CHROMOSOMAL ABNORMALITY IN FETUS, TRISOMY 13, FETUS 1: ICD-10-CM

## 2023-04-18 DIAGNOSIS — O26.90 PREGNANCY RELATED CONDITION, ANTEPARTUM: Primary | ICD-10-CM

## 2023-04-18 DIAGNOSIS — O09.521 MULTIGRAVIDA OF ADVANCED MATERNAL AGE IN FIRST TRIMESTER: ICD-10-CM

## 2023-04-18 DIAGNOSIS — Z98.891 HISTORY OF C-SECTION: Primary | ICD-10-CM

## 2023-04-18 NOTE — TELEPHONE ENCOUNTER
MFM referral placed per Dr. Sasha Keller. Scheduling notified. Nasima Ortiz RN ....................  4/18/2023   12:00 PM

## 2023-04-18 NOTE — TELEPHONE ENCOUNTER
I called Hodan yesterday and apologized and discussed that I read the [facility] report incorrectly-- she did show an elevated risk for T13. The gender of the baby is male.    We discussed that this is a screening test, and explained the differences between the positive and negative predictive values of this test. We discussed the definitive follow up testing in the form of an amniocentesis which can be coordinated with an MFM consultation. At this time she is unsure if she would like to do this. We also discussed some of the ultrasound markers for T13 which we can assess during our level II anatomy survey US at 19-20 weeks as well.    We discussed the anticipated progression of T13 as well as all options moving forward regarding definitive testing, pregnancy outcomes and options.    She will call us back if MFM consultation is desired.    In the meantime, we discussed that we can order a follow up US to be paired with her next OB visit. She understands it may be too early to determine anatomical US markers    Mireya Keller MD on 4/18/2023 at 10:12 AM

## 2023-04-19 ASSESSMENT — PATIENT HEALTH QUESTIONNAIRE - PHQ9
SUM OF ALL RESPONSES TO PHQ QUESTIONS 1-9: 3
SUM OF ALL RESPONSES TO PHQ QUESTIONS 1-9: 3
10. IF YOU CHECKED OFF ANY PROBLEMS, HOW DIFFICULT HAVE THESE PROBLEMS MADE IT FOR YOU TO DO YOUR WORK, TAKE CARE OF THINGS AT HOME, OR GET ALONG WITH OTHER PEOPLE: NOT DIFFICULT AT ALL

## 2023-04-20 ENCOUNTER — VIRTUAL VISIT (OUTPATIENT)
Dept: MATERNAL FETAL MEDICINE | Facility: CLINIC | Age: 43
End: 2023-04-20
Attending: STUDENT IN AN ORGANIZED HEALTH CARE EDUCATION/TRAINING PROGRAM
Payer: COMMERCIAL

## 2023-04-20 DIAGNOSIS — O28.5 ABNORMAL CHROMOSOMAL AND GENETIC FINDING ON ANTENATAL SCREENING OF MOTHER: Primary | ICD-10-CM

## 2023-04-20 DIAGNOSIS — O26.90 PREGNANCY RELATED CONDITION, ANTEPARTUM: ICD-10-CM

## 2023-04-20 DIAGNOSIS — E03.9 HYPOTHYROIDISM, UNSPECIFIED TYPE: ICD-10-CM

## 2023-04-20 DIAGNOSIS — O09.521 MULTIGRAVIDA OF ADVANCED MATERNAL AGE IN FIRST TRIMESTER: ICD-10-CM

## 2023-04-20 PROCEDURE — 96040 HC GENETIC COUNSELING, EACH 30 MINUTES: CPT | Mod: GT | Performed by: GENETIC COUNSELOR, MS

## 2023-04-20 NOTE — Clinical Note
Genetic counseling consult note from April 20, 2023.  Tiffany Pruitt MS, [facility] Licensed Genetic Counselor [facility] Maternal Fetal Medicine [email or web address] [phone]

## 2023-04-20 NOTE — PROGRESS NOTES
"[facility]  Genetic Counseling Consult    Patient:  Hodan Iniguez YOB: 1980   Date of Service:  23      Hodan Iniguez is a 42 year old who is being evaluated via a billable video visit.      How would you like to obtain your AVS? MyChart  If the video visit is dropped, the invitation should be resent by: Send to e-mail at: [email or web address]  Will anyone else be joining your video visit? No    Video-Visit Details    Type of service:  Video Visit   Video Start Time: 10:56am  Video End Time: 11:32am    Originating Location (pt. Location): Home  Distant Location (provider location):  On-site  Platform used for Video Visit: Sketchfab       Impression/Plan:   1.  Hodan had a genetic consultation today to discuss her positive non-invasive prenatal screen for trisomy 13. After a detailed discussion of her results and options for next steps, she has opted to proceed with chorionic villus sampling (CVS), which has been scheduled for 23.     Pregnancy History:   /Parity:    Age at Delivery: 43 year old  ELEN: 2023, by Ultrasound  Gestational Age: 11w5d    No significant complications or exposures were reported in the current pregnancy.    Hodan carrera pregnancy history is significant for:  o  full-term delivery via , male, alive and well    Medical History:   Hodan carrera reported medical history is not expected to impact pregnancy management or risks to fetal development.       Family History:   An abbreviated three-generation pedigree was obtained, and is scanned under the  Media  tab.   The following significant findings were reported by Hodan:    Hodan has four siblings, all of whom have children with no significant concerns.     Hodan's maternal grandparents had \"multiple\" losses and three successful pregnancies. Hodan's maternal aunt had one child with Down syndrome who passed away shortly after birth. This same aunt " "also had a child who passed away from \"muscular dystrophy\" at age 25.   o We discussed that the family history of multiple losses and the family history of another chromosome condition does increase the suspicion for an underlying chromosomal rearrangement if a diagnosis of trisomy 13 is made in this pregnancy. Based on Hodan's age at delivery, if a diagnosis of trisomy 13 is made, it will most likely be due to nondisjunction. However, a karyotype will be a valuable assessment for this clinical picture.   o Hodan is not sure of the name of her cousin's muscular dystrophy diagnosis. We discussed that this clinical picture does raise the concern for a condition such as Duchenne or Salgado muscular dystrophy. If Hodan's cousin had this condition, her aunt would have a chance to be a carrier (though the condition has a ~1/3 de kena rate). Hodan would then also have a chance to be a carrier of the condition. The current pregnancy is predicted to be XY. If Hodan were a carrier of Duchenne muscular dystrophy, she would have a 50% chance to pass on the condition, with her XY children expected to be most at risk for severe outcomes. I encouraged her to attempt to gather more information in the event that the current pregnancy is ongoing or if she is ever planning a future pregnancy.       Hodan's partner, Jey, is 35 and healthy.     Jey may have had one prior pregnancy with a previous partner that resulted in miscarriage,  though no details are available.     Jey has one maternal half-siblings who is alive and well.     Otherwise, the reported family history is negative for multiple miscarriages, stillbirths, birth defects, intellectual disability, known genetic conditions, and consanguinity.       Carrier Screening:   Hodan underwent screening for spinal muscular atrophy and cystic fibrosis (165 common variants only) in 2021. She has not undergone more comprehensive carrier screening. Discussed this as an option " for further evaluation based on        Risk Assessment:   We explained that the risk for fetal chromosome abnormalities increases with maternal age. We discussed specific features of common chromosome abnormalities, including Down syndrome, trisomy 13, trisomy 18, and sex chromosome conditions.      - At age 43 at midtrimester, the risk to have a baby with Down syndrome is 1 in 23.     - At age 43 at midtrimester, the risk to have a baby with any chromosome abnormality is 1 in 15.     The patient had maternal serum screening in the current pregnancy: Non-invasive Prenatal Testing (NIPT) that screened high-risk for trisomy 13. The results are normal for chromosome 18, chromosome 21, and the sex chromosomes (no aneuploidy detected). [facility] calculates a 65.6% PPV using a 99.99% sensitivity and a 99.69% specificity.      We discussed that NIPT relies on pieces of chromosomes (or cell-free DNA) found in maternal serum. These pieces are a mix of maternal and placental fragments. Since the placenta and the fetus develop from the same fertilized embryo, measuring cell-free placental DNA can give a risk assessment for the fetus. However, NIPT will never diagnose a condition in the fetus.     We reviewed the possible explanations for a positive NIPT result include:    The fetus may have trisomy 13.   o Trisomy 13 is also called Patau syndrome. It is considered a life-limiting condition and is often characterized by multiple fetal anomalies. Common associated abnormalities include holoprosencephaly (failure of brain division) or other central nervous system differences, cleft lip and palate, eye anomalies, microcephaly (small head), polydactyly (extra fingers or toes), cardiac (heart) anomalies, and sometimes differences in genital development. The majority of pregnancies impacted by trisomy 13 die in utero. The live birth rate for babies with this condition is 13-33%. The median survival for liveborn children is seven days.  Greater than 90% of affected children will die within the first year. There are rare exceptions in which babies with trisomy 13 live into childhood. They are often deaf, blind, non-verbal, non-ambulatory, and may have seizures. Those that do survive are more likely to have mosaic trisomy 13 or have fewer physical differences.     Both the fetus and the placenta have two cell lines, one with a normal karyotype and one with trisomy 13 (generalized mosaicism).    An abnormal cell line with trisomy 13 may be confined to the placenta only (confined placental mosaicism). If this is true, there is an increased risk for IUGR. For trisomy 13, there is a relatively high chance for confined placental mosaicism.    The NIPT result could represent a partial aneuploidy (duplication of part of chromosome 13) that may be could be confined to the placenta or present in the fetus. Since many NIPT labs will comment on suspicion for partial aneuploidy, this explanation is less likely.    Neither the fetus nor the placenta have an abnormal cell line (a false positive).    Testing Options:   We discussed the following options:    We discussed the following ultrasound options:  Nuchal translucency (NT) ultrasound    Ultrasound between [id number] that includes nuchal translucency measurement and nasal bone assessments    Nuchal translucency refers to the space at the back of the neck where fluid builds up. This is a normal finding and there is only concern if there is more fluid than expected    Nasal bone refers to the small bone in the nose. There is concern for conditions like Down syndrome if the bone cannot be seen at all    This ultrasound can be done as part of first trimester screening, at the same time as another screen (NIPT), at the same time as a CVS, or if the patients does not want serum screening.    Markers on ultrasound detects about 70% of pregnancies with aneuploidy    Increased NT measurements can also increase the  risk for a birth defect, like a heart defect    Trisomy can present with many features (such as holoprosencephaly) that can be identified in the first trimester.     2/3 ultrasound (Early Second Trimester)    Ultrasound between 14-18 weeks gestation    Early anatomy ultrasound for major birth defects    Commonly has suboptimal views due to early gestation so not a substitute for the 18-20w ultrasound    Recommended for pregnancies with abnormal screening results, those interested in amniocentesis, or other risk factors    Comprehensive level II ultrasound (Fetal Anatomy Ultrasound)    Ultrasound done between 18-20 weeks gestation    Screens for major birth defects and markers for aneuploidy (like trisomy 21 and trisomy 18)    Includes assessment of the fetal growth, internal organs, placenta, and amniotic fluid      We discussed the following diagnostic options:   Chorionic villus sampling (CVS)    Invasive diagnostic procedure done between 10w0d and 13w6d    The procedure collects a small sample from the placenta for the purpose of chromosomal testing and/or other genetic testing    Diagnostic result; more than 99% sensitivity for fetal chromosome abnormalities    Cannot screen for open neural tube defects, maternal serum AFP after 15 weeks is recommended    Amniocentesis    Invasive diagnostic procedure done after 15 weeks gestation    The procedure collects a small sample of amniotic fluid for the purpose of chromosomal testing and/or other genetic testing    Diagnostic result; more than 99% sensitivity for fetal chromosome abnormalities    Testing for AFP in the amniotic fluid can test for open neural tube defects    Hodan would like to proceed with a CVS. We discussed the following testing options:      Fluorescence In Situ Hybridization (FISH) analysis is a preliminary targeted chromosome analysis that determines how many copies of chromosome 13, 18, 21, X, and Y are present in the prenatal sample. FISH  cannot detect all chromosomal differences and cannot exclude mosaicism.     Chromosome analysis (karyotype/g-bands) assess each chromosome to provide information regarding number, structure, and location of all chromosomes. This can rule in or rule out full chromosome conditions, such as Down syndrome.     Microarray testing involves looking for small extra (duplications) or missing (deletions) pieces of DNA within the chromosomes.  Chromosomal deletions and duplications may cause problems with an individual's health and development including learning disabilities, developmental delays, growth issues, physical differences, and psychiatric challenges. The specific symptoms would depend on the size and gene content of the specific chromosomal region involved.  Microarray testing can also identify whether there are areas within a pair of chromosomes that are too similar to each other, which could indicate that the individual's parents may be related to each other such as cousins, or could represent a phenomenon known as uniparental disomy (UPD) which is where an individual inherits more of a specific chromosome region from one parent than the other parent.  Depending on the chromosome(s) involved, this  genetic similarity  can sometimes lead to growth, developmental and/or physical problems for the individual. Sometimes a microarray can uncover additional incidental findings, such as carrier status, that may require further evaluation.       We reviewed the benefits and limitations of this testing.  Screening tests provide a risk assessment specific to the pregnancy for certain fetal chromosome abnormalities, but cannot definitively diagnose or exclude a fetal chromosome abnormality.  Follow-up genetic counseling and consideration of diagnostic testing is recommended with any abnormal screening result.     Diagnostic tests carry inherent risks- including risk of miscarriage- that require careful consideration.  These  tests can detect fetal chromosome abnormalities with greater than 99% certainty.  Results can be compromised by maternal cell contamination or mosaicism, and are limited by the resolution of cytogenetic G-banding technology.  There is no screening nor diagnostic test that can detect all forms of birth defects or mental disability.     It was a pleasure to be involved with Hodan s care.       Tiffany Pruitt MS, [facility]  Licensed Genetic Counselor  [facility]  Maternal Fetal Medicine  [email or web address]  [phone]

## 2023-04-21 NOTE — TELEPHONE ENCOUNTER
[facility] sent Rx request for the following:    LEVOTHYROXINE 0.175MG (175MCG) TABS  Last Prescription Date:   11/2/22  Last Fill Qty/Refills:         90, R-1    Last Office Visit:              10/28/22   Future Office visit:           None   Patient is pregnant and follows Dr. Gypsy Kamara, RN on 4/21/2023 at 10:17 AM

## 2023-04-23 ENCOUNTER — MYC MEDICAL ADVICE (OUTPATIENT)
Dept: OBGYN | Facility: OTHER | Age: 43
End: 2023-04-23
Payer: COMMERCIAL

## 2023-04-24 ENCOUNTER — OFFICE VISIT (OUTPATIENT)
Dept: MATERNAL FETAL MEDICINE | Facility: CLINIC | Age: 43
End: 2023-04-24
Attending: OBSTETRICS & GYNECOLOGY
Payer: COMMERCIAL

## 2023-04-24 ENCOUNTER — HOSPITAL ENCOUNTER (OUTPATIENT)
Dept: ULTRASOUND IMAGING | Facility: CLINIC | Age: 43
Discharge: HOME | End: 2023-04-24
Attending: OBSTETRICS & GYNECOLOGY
Payer: COMMERCIAL

## 2023-04-24 ENCOUNTER — TRANSFERRED RECORDS (OUTPATIENT)
Dept: HEALTH INFORMATION MANAGEMENT | Facility: CLINIC | Age: 43
End: 2023-04-24

## 2023-04-24 DIAGNOSIS — O09.521 MULTIGRAVIDA OF ADVANCED MATERNAL AGE IN FIRST TRIMESTER: Primary | ICD-10-CM

## 2023-04-24 DIAGNOSIS — O09.521 MULTIGRAVIDA OF ADVANCED MATERNAL AGE IN FIRST TRIMESTER: ICD-10-CM

## 2023-04-24 DIAGNOSIS — O28.5 ABNORMAL CHROMOSOMAL AND GENETIC FINDING ON ANTENATAL SCREENING OF MOTHER: ICD-10-CM

## 2023-04-24 DIAGNOSIS — O26.90 PREGNANCY RELATED CONDITION, ANTEPARTUM: ICD-10-CM

## 2023-04-24 DIAGNOSIS — O35.10X0 SUSPECTED CHROMOSOME ANOMALY OF FETUS AFFECTING MANAGEMENT OF MOTHER IN SINGLETON PREGNANCY, ANTEPARTUM: ICD-10-CM

## 2023-04-24 DIAGNOSIS — O28.5 ABNORMAL CHROMOSOMAL AND GENETIC FINDING ON ANTENATAL SCREENING OF MOTHER: Primary | ICD-10-CM

## 2023-04-24 PROCEDURE — 88275 CYTOGENETICS 100-300: CPT | Mod: 59 | Performed by: OBSTETRICS & GYNECOLOGY

## 2023-04-24 PROCEDURE — 96040 HC GENETIC COUNSELING, EACH 30 MINUTES: CPT | Performed by: GENETIC COUNSELOR, MS

## 2023-04-24 PROCEDURE — 59015 CHORION BIOPSY: CPT | Performed by: OBSTETRICS & GYNECOLOGY

## 2023-04-24 PROCEDURE — 88235 TISSUE CULTURE PLACENTA: CPT | Performed by: OBSTETRICS & GYNECOLOGY

## 2023-04-24 PROCEDURE — 36415 COLL VENOUS BLD VENIPUNCTURE: CPT | Performed by: OBSTETRICS & GYNECOLOGY

## 2023-04-24 PROCEDURE — 99203 OFFICE O/P NEW LOW 30 MIN: CPT | Mod: 25 | Performed by: OBSTETRICS & GYNECOLOGY

## 2023-04-24 PROCEDURE — 76945 ECHO GUIDE VILLUS SAMPLING: CPT | Mod: XS

## 2023-04-24 PROCEDURE — 76801 OB US < 14 WKS SINGLE FETUS: CPT

## 2023-04-24 PROCEDURE — 81265 STR MARKERS SPECIMEN ANAL: CPT | Performed by: OBSTETRICS & GYNECOLOGY

## 2023-04-24 PROCEDURE — 59015 CHORION BIOPSY: CPT

## 2023-04-24 PROCEDURE — 250N000011 HC RX IP 250 OP 636: Performed by: OBSTETRICS & GYNECOLOGY

## 2023-04-24 PROCEDURE — 76801 OB US < 14 WKS SINGLE FETUS: CPT | Mod: 26 | Performed by: OBSTETRICS & GYNECOLOGY

## 2023-04-24 PROCEDURE — 76945 ECHO GUIDE VILLUS SAMPLING: CPT | Mod: 26 | Performed by: OBSTETRICS & GYNECOLOGY

## 2023-04-24 PROCEDURE — 59015 CHORION BIOPSY: CPT | Mod: XS

## 2023-04-24 RX ORDER — HEPARIN SODIUM (PORCINE) LOCK FLUSH IV SOLN 100 UNIT/ML 100 UNIT/ML
1 SOLUTION INTRAVENOUS ONCE
Status: COMPLETED | OUTPATIENT
Start: 2023-04-24 | End: 2023-04-24

## 2023-04-24 RX ORDER — LEVOTHYROXINE SODIUM 175 UG/1
TABLET ORAL
Qty: 90 TABLET | Refills: 1 | Status: SHIPPED | OUTPATIENT
Start: 2023-04-24 | End: 2023-10-12

## 2023-04-24 RX ADMIN — HEPARIN 1 ML: 100 SYRINGE at 09:30

## 2023-04-24 NOTE — PROGRESS NOTES
"Please see \"Imaging\" tab under \"Chart Review\" for details of today's visit.    Afia Mckeon    "

## 2023-04-24 NOTE — PROGRESS NOTES
[facility]  Genetic Counseling Consult    Patient:  Hodan Iniguez YOB: 1980   Date of Service:  23      Hodan Iniguez was seen at the [facility] for genetic consultation for the indication of a high-risk non-invasive prenatal screen for trisomy 13. Hodan was accompanied to today's visit by her mother, Ann.        Impression/Plan:   1.  Hodan had a genetic consultation today. Today she has elected to pursue chorionic villus sampling and consent was obtained. The following was ordered on the prenatal sample: FISH preliminary analysis with reflex to chromosome analysis (karyotype) or chromosomal microarray. Results are expected within 2-3 days for FISH and 10-14 days for the karyotype or the microarray. All results will be available in Epic. We will contact her to discuss the results, and a copy will be forwarded to the referring OB provider. Hodan provided verbal permission for detailed results to be left on her voicemail.    2.  Hodan also underwent an first trimester complete ultrasound today which identified a cystic hygroma and possible cleft lip/palate. Please see the ultrasound report for additional details.     Pregnancy History:   /Parity:    Age at Delivery: 43 year old  ELEN: 2023, by Ultrasound  Gestational Age: 12w2d    No significant complications or exposures were reported in the current pregnancy.    Hodan s pregnancy history is significant for:  ?  full-term delivery via , male, alive and well    Medical History:   Hodan carrera reported medical history is not expected to impact pregnancy management or risks to fetal development.       Family History:   A three-generation pedigree was obtained, and is scanned under the  Media  tab.     Family history was discussed in detail on 23. Hodan's mother clarified today that Hodan's cousin did have a diagnosis of Duchenne  muscular dystrophy. We again discussed that if this is an ongoing pregnancy, carrier screening for Hodan should be considered.        Risk Assessment:   We explained that the risk for fetal chromosome abnormalities increases with maternal age. We discussed specific features of common chromosome abnormalities, including Down syndrome, trisomy 13, trisomy 18, and sex chromosome conditions.      - At age 43 at midtrimester, the risk to have a baby with Down syndrome is 1 in 23.     - At age 43 at midtrimester, the risk to have a baby with any chromosome abnormality is 1 in 15.     The patient had maternal serum screening in the current pregnancy: Non-invasive Prenatal Testing (NIPT) that screened high-risk for trisomy 13. The results are normal for chromosome 18, chromosome 21, and the sex chromosomes (no aneuploidy detected). [employer] calculates a 65.6% PPV using a 99.99% sensitivity and a 99.69% specificity.    This result was discussed in detail on 4/20/23. Please see corresponding documentation for further discussion.    Testing Options:   We discussed the following options:    Nuchal translucency (NT) ultrasound    Ultrasound between 52w3t-41z5a that includes nuchal translucency measurement and nasal bone assessments    Nuchal translucency refers to the space at the back of the neck where fluid builds up. This is a normal finding and there is only concern if there is more fluid than expected    Nasal bone refers to the small bone in the nose. There is concern for conditions like Down syndrome if the bone cannot be seen at all    This ultrasound can be done as part of first trimester screening, at the same time as another screen (NIPT), at the same time as a CVS, or if the patients does not want serum screening.    Markers on ultrasound detects about 70% of pregnancies with aneuploidy    Increased NT measurements can also increase the risk for a birth defect, like a heart defect    Trisomy can present with many  features (such as holoprosencephaly) that can be identified in the first trimester.      2/3 ultrasound (Early Second Trimester)  ? Ultrasound between 14-18 weeks gestation  ? Early anatomy ultrasound for major birth defects  ? Commonly has suboptimal views due to early gestation so not a substitute for the 18-20w ultrasound  ? Recommended for pregnancies with abnormal screening results, those interested in amniocentesis, or other risk factors     Comprehensive level II ultrasound (Fetal Anatomy Ultrasound)  ? Ultrasound done between 18-20 weeks gestation  ? Screens for major birth defects and markers for aneuploidy (like trisomy 21 and trisomy 18)  ? Includes assessment of the fetal growth, internal organs, placenta, and amniotic fluid     Amniocentesis  ? Invasive diagnostic procedure done after 15 weeks gestation  ? The procedure collects a small sample of amniotic fluid for the purpose of chromosomal testing and/or other genetic testing  ? Diagnostic result; more than 99% sensitivity for fetal chromosome abnormalities  ? Testing for AFP in the amniotic fluid can test for open neural tube defects      Hodan opted to pursue chorionic villus sampling (CVS) today.     Chorionic villus sampling (CVS)    Invasive procedure typically performed in the first trimester by which placental villi are obtained for the purpose of chromosome analysis and/or other prenatal genetic analysis    Diagnostic results; >99% sensitivity for fetal chromosome abnormalities    Results are not guaranteed.    Cannot test for open neural tube defects; maternal serum AFP after 15 weeks is recommended    The risk of pregnancy loss association with CVS is generally estimated to be 1/300 to 1/500.      We reviewed the chorionic villus sampling consent form as well as the genetic testing consent form. All questions were answered to the patient's apparent satisfaction. The following testing was ordered on the prenatal sample:     FISH preliminary  analysis with results available in 48-72 hours     If FISH results are abnormal: reflex to chromasome analysis with results likely taking 7-10 days after the FISH results.    If FISH results are normal: reflex to microarray analysis with results likely taking 7-10 days after the FISH results.      Maternal Cell Contamination studies are performed on CVS specimens with microarray testing. For CVS specimens with a normal female karyotype result and no microarray testing, senior living study will be performed and resulted separately.     Results:    We discussed that FISH results are considered preliminary with chromosome analysis or a microarray result determined final. There is a less than 1% chance for FISH results to be discordant from the final results. Patients are encouraged to wait to make pregnancy decisions until the final results are received but understand that some patients may feel comfortable making those after FISH given the context.     Due to the sample being placental in origin, rather than fetal, there is a less than 1% chance the results will be discordant from the fetus. Given a positive result this chance may be lower if ultrasound anomalies or aneuploidy markers are detected.    Final CVS results are contingent upon the maternal cell contamination results.    Results will be communicated to the patient, forwarded to the referring provider, and available in EPIC.          Fluorescence In Situ Hybridization (FISH) analysis is a preliminary targeted chromosome analysis that determines how many copies of chromosome 13, 18, 21, X, and Y are present in the prenatal sample. FISH cannot detect all chromosomal differences and cannot exclude mosaicism.     Chromosome analysis (karyotype/g-bands) assess each chromosome to provide information regarding number, structure, and location of all chromosomes. This can rule in or rule out full chromosome conditions, such as Down syndrome.     Microarray testing involves  looking for small extra (duplications) or missing (deletions) pieces of DNA within the chromosomes.  Chromosomal deletions and duplications may cause problems with an individual's health and development including learning disabilities, developmental delays, growth issues, physical differences, and psychiatric challenges. The specific symptoms would depend on the size and gene content of the specific chromosomal region involved.  Microarray testing can also identify whether there are areas within a pair of chromosomes that are too similar to each other, which could indicate that the individual's parents may be related to each other such as cousins, or could represent a phenomenon known as uniparental disomy (UPD) which is where an individual inherits more of a specific chromosome region from one parent than the other parent.  Depending on the chromosome(s) involved, this  genetic similarity  can sometimes lead to growth, developmental and/or physical problems for the individual. Sometimes a microarray can uncover additional incidental findings, such as carrier status, that may require further evaluation.     We reviewed the benefits, limitations, and possible results from a microarray analysis which can include:    Negative: No clinically significant deletions or duplications were identified. This may not rule out a genetic cause for the fetal features.    Positive: A deletion, duplication, or genetic similarity was identified that may be associated with a particular set of symptoms or known syndrome.     Variant of uncertain significance (VUS): A deletion or duplication was identified, but it is not known if it explains the clinical features or it is is normal variation.    If the microarray returns a VUS, parental testing may be recommended to help clarify pathogenicity.       These results will be available in [facility].  We will contact her to discuss the results, and a copy will be forwarded to the office of the  referring OB provider.    We reviewed the benefits and limitations of this testing.  Screening tests provide a risk assessment specific to the pregnancy for certain fetal chromosome abnormalities, but cannot definitively diagnose or exclude a fetal chromosome abnormality.  Follow-up genetic counseling and consideration of diagnostic testing is recommended with any abnormal screening result.     Diagnostic tests carry inherent risks- including risk of miscarriage- that require careful consideration.  These tests can detect fetal chromosome abnormalities with greater than 99% certainty.  Results can be compromised by maternal cell contamination or mosaicism, and are limited by the resolution of cytogenetic G-banding technology.  There is no screening nor diagnostic test that can detect all forms of birth defects or mental disability.     It was a pleasure to be involved with Hodan s care. Face-to-face time of the meeting was 24 minutes.      Tiffany Pruitt MS, [facility]  Licensed Genetic Counselor  [facility]  Maternal Fetal Medicine  [email or web address]  [phone]

## 2023-04-24 NOTE — TELEPHONE ENCOUNTER
Patient was notified that [facility] ultrasounds do not take place on Mondays at [facility]. She was informed that she could [place] cities [place]. Patient states she will call back once she knows work schedule to reschedule ultrasound    Corinne R Thayer, RN on 4/24/2023 at 10:43 AM

## 2023-04-24 NOTE — PATIENT INSTRUCTIONS
As part of your visit in Maternal Fetal Medicine, you elected to have a chorionic villus sampling (CVS), an invasive diagnostic procedure. The following information was discussed.:  CVS is an invasive procedure in which placental villi are obtained for the purpose of chromosome analysis and/or other prenatal genetic analysis. CVS cannot test for open neural tube defects; maternal serum AFP after 15 weeks is recommended  The risk of pregnancy loss association with CVS is generally estimated to be 1/300 to 1/500.   Cramping is very common. It is usually mild and goes away within a few hours. You may take Acetaminophen (Tylenol), if needed.  Avoid strenuous activities for the rest of the day after the CVS is done. This includes heavy lifting, jogging or other exercise.  You should call your care regular obstetric (OB) care provider if you have:  Heavy bleeding (like a period, or more) beyond the day of the test.  Clear fluid (like water) leaking from your vagina.  Severe abdominal (belly) pain.  Flu-like symptoms within two weeks of the test. These include chills, muscle aches or a fever over 100.4 F (38 C) (under the tongue).  The following testing was ordered on the CVS sample:  FISH preliminary analysis with results available in 24-72 hours   If FISH results are abnormal- reflex to chromosome analysis with results likely taking 7-10 days after the FISH results  If FISH results are normal-reflex to microarray analysis with results likely taking 7-10 days after the FISH results.   Maternal Cell Contamination studies are performed on CVS specimens with microarray testing. For CVS specimens with a normal female karyotype result and no microarray testing, senior care study will be performed and resulted separately.  We discussed that FISH results are considered preliminary with chromosome analysis or a microarray result determined final. There is a less than 1% chance for FISH results to be discordant from the results.   Due to  the sample being placental in origin, rather than fetal, there is a less than 1% chance the results will be discordant from the fetus. Given a positive result this chance may be lower if ultrasound anomalies or aneuploidy markers are detected.  Final CVS results are contingent upon the maternal cell contamination results.   Results are not guaranteed  Results will be communicated to you, forwarded to your primary OB provider, and available in [employer].

## 2023-04-25 LAB — SCANNED LAB RESULT: ABNORMAL

## 2023-04-26 ENCOUNTER — TELEPHONE (OUTPATIENT)
Dept: MATERNAL FETAL MEDICINE | Facility: CLINIC | Age: 43
End: 2023-04-26
Payer: COMMERCIAL

## 2023-04-26 LAB — CHROM ANALY INTERPHASE CVS FISH-IMP: NORMAL

## 2023-04-26 NOTE — TELEPHONE ENCOUNTER
April 26, 2023     I left a detailed voicemail for Hodan as requested to discuss the fluorescence in situ hybridization (FISH) results for chromosomes 13, 18, 21, X and Y from her chorionic villus sampling.     Results are ABNORMAL, showing three signals for chromosome 13, which is consistent with a preliminary diagnosis of trisomy 13. There were two signals for chromosomes 21, 18, and the sex chromosomes. These results should match the final chromosome results greater than 99% of the time. Given the ultrasound findings consistent with trisomy 13, some patients may feel comfortable making decisions based on this preliminary result alone. The final chromosome result should be completed in approximately 10-12 days.     I encouraged Hodan to call me to discuss further.     Tiffany Pruitt MS, [facility]  Licensed Genetic Counselor  [facility]  Maternal Fetal Medicine  [email or web address]  [phone]

## 2023-04-27 ENCOUNTER — TELEPHONE (OUTPATIENT)
Dept: MATERNAL FETAL MEDICINE | Facility: CLINIC | Age: 43
End: 2023-04-27
Payer: COMMERCIAL

## 2023-04-27 LAB — MCCMA SPECIMEN: NORMAL

## 2023-04-27 NOTE — TELEPHONE ENCOUNTER
April 27, 2023    Received call from Hodan to discuss her FISH results. She shared that she feels like she just doesn't know what to do and she doesn't want to make the wrong decision. She asked how long she has to decide if she wants to end the pregnancy or not. Given that trisomy 13 is considered a lethal diagnosis, there is no gestational age limit.     Hodan would like to monitor with imaging for now. We discussed the recommendation for an early anatomy scan, a level II ultrasound, and a fetal echocardiogram if the pregnancy is ongoing. I will review with the physician team to determine if the 2/3 ultrasound and level II could be performed via telehealth. We also reviewed that given the cystic hygroma and the underlying diagnosis, there is a very high chance for fetal demise.     In the event that this pregnancy is ongoing, it will be important to discuss what interventions may be available for Hodan's baby or what palliative care options there would be. I let her know that if there are major structural differences, it is not a guarantee that the surgical teams would be willing to offer repairs.     Hodan verbalized that she is just hoping that her baby has only the orofacial clefts and that outcomes could be good. We reviewed that babies with trisomy 13 and few structural anomalies often have poor outcomes primarily related to underlying neurological differences. The vast majority of pregnancies with trisomy 13 do not survive to term and those that do survive to the end of the pregnancy are often stillborn or pass away within hours to days of life. There have been instances of babies with trisomy 13 surviving longer, though these may be more likely to be mosaic cases.     I let Hodan know that we are here to support her no matter how she opts to proceed. I also discussed the option of behavioral health services for additional support, which Hodan stated she will think about. I encouraged her to call me  with any questions or concerns.     Tiffany Pruitt MS, [facility]  Licensed Genetic Counselor  [facility]  Maternal Fetal Medicine  [email or web address]  [phone]

## 2023-04-28 ENCOUNTER — TELEPHONE (OUTPATIENT)
Dept: MATERNAL FETAL MEDICINE | Facility: CLINIC | Age: 43
End: 2023-04-28
Payer: COMMERCIAL

## 2023-04-28 ENCOUNTER — MYC MEDICAL ADVICE (OUTPATIENT)
Dept: OBGYN | Facility: OTHER | Age: 43
End: 2023-04-28
Payer: COMMERCIAL

## 2023-04-28 NOTE — TELEPHONE ENCOUNTER
Call placed to patient. She was informed that we are unable to provide this service at [facility]. She was given information on the facility in [place] that would be able to assist her. She was advised to call for more information or follow up with [facility].    Cristina Shay RN...................4/28/2023 3:23 PM

## 2023-04-28 NOTE — TELEPHONE ENCOUNTER
Called Hodan at the request of our Patient Care Coordinator who noted the patient has questions about medication  options in her chart.    I reviewed with one of our MFM physicians and Ob physicians that medication abortions (Mifepristone/Misoprostol) are available up until 11 weeks (77days GA) here a [facility].     The patient is 12w6d today. I reviewed this medication  timeline with Hodan who verbalized understanding that medication  is not available for this gestational age.  We discussed the option of D&C  (up until 14 weeks), a D&E (after 14 weeks) or IOL.  Hodan vocalized a preference for pregnancy termination closer to home.    I called and left a VM for Dr. Keller to see if  pregnancy termination procedures for Hodan would be an option there. If this is not a service available by her provider in [facility], we can accommodate the patient's preference here at [facility]. My call back number was provided to Dr. Keller.     I called Hodan back to let her know I left a VM for her primary OB provider and we'll follow-up next week.     Elinor Tee GC on 2023 at 1:10 PM

## 2023-05-01 ENCOUNTER — TELEPHONE (OUTPATIENT)
Dept: MATERNAL FETAL MEDICINE | Facility: CLINIC | Age: 43
End: 2023-05-01
Payer: COMMERCIAL

## 2023-05-02 ENCOUNTER — TELEPHONE (OUTPATIENT)
Dept: OBGYN | Facility: CLINIC | Age: 43
End: 2023-05-02
Payer: COMMERCIAL

## 2023-05-02 ENCOUNTER — TELEPHONE (OUTPATIENT)
Dept: NURSING | Facility: CLINIC | Age: 43
End: 2023-05-02
Payer: COMMERCIAL

## 2023-05-02 ENCOUNTER — TELEPHONE (OUTPATIENT)
Dept: MATERNAL FETAL MEDICINE | Facility: CLINIC | Age: 43
End: 2023-05-02
Payer: COMMERCIAL

## 2023-05-02 DIAGNOSIS — O35.9XX0 KNOWN FETAL ANOMALY, ANTEPARTUM, SINGLE OR UNSPECIFIED FETUS: Primary | ICD-10-CM

## 2023-05-02 DIAGNOSIS — O35.11X0 FETUS WITH TRISOMY 13, SINGLE GESTATION: ICD-10-CM

## 2023-05-02 DIAGNOSIS — Z98.891 HISTORY OF C-SECTION: Primary | ICD-10-CM

## 2023-05-02 NOTE — TELEPHONE ENCOUNTER
Referral received from [facility] for D&E.  She is being referred to [place]  Trisomy 13  Gestational age 13+3  ELEN: 2023  [facility] has secured financials  Pt will need Medical Necessity form    Email to [facility] - pt is requesting burial information and services for possible overnight stay and for information  services.    Case request entered

## 2023-05-02 NOTE — TELEPHONE ENCOUNTER
"May 2, 2023    Called Hodan to discuss coverage for D&C and D&E through [facility]. Per financial securing:    \"This patient has [employer] for coverage, which does not cover abortions.  [place] Medicaid covers, please make sure Medical Necessity Statement is signed.\" Will need to switch coverage to Medicaid once scheduled.     Hodan would like to move forward with setting up a D&C or D&E with [facility]. She is aware that scheduling may not occur within the D&C window and that she may need a two day procedure with laminaria placement and surgery. She asked if there might be any funding for travel or lodging, which I will look into.     I also let Hodan know that her OB provider can offer her a 3D ultrasound and encouraged her to call her clinic.     Warm handoff to  [facility] triage RN, Kellen, completed.    Tiffany Pruitt, MS, [facility]  Licensed Genetic Counselor  [facility]  Maternal Fetal Medicine  [email or web address]  [phone]  "

## 2023-05-02 NOTE — PROGRESS NOTES
Called and discussed 3D US scheduled for Hodan at 8:15 am on Thursday 5/4/2023.    At this time, all procedure planning is coordinated with [facility] team and [facility] team. Discussed with Hodan that I am happy to provide all post-operative checks/follow up here at home if she is interested in this.    Mireya Keller MD on 5/2/2023 at 3:15 PM

## 2023-05-02 NOTE — TELEPHONE ENCOUNTER
Called patient to discuss procedure scheduling. Patient states she is not ready to move forward with scheduling at this time. Provided patient with direct clinic call back number for surgery scheduling whenever/if ever she is ready to move forward with scheduling.     Routing to RD OBGYN to update.       Manuela Vasquez/her/hers  [facility] Surgery Scheduler

## 2023-05-03 ENCOUNTER — DOCUMENTATION ONLY (OUTPATIENT)
Dept: CARE COORDINATION | Facility: CLINIC | Age: 43
End: 2023-05-03
Payer: COMMERCIAL

## 2023-05-03 NOTE — PROGRESS NOTES
"Received referral from [facility] with request for SW to contact patient to respond to her questions about fetal disposition and to discuss potential lodging options, should she need them.    Phone call to Hodan this morning.  She is busy with a young toddler at home but willing to talk.  She started the conversation by stating,  \"I don't really know what to ask you.\"    Hodan is not yet ready to schedule her procedure.  Her questions suggest that she will be scheduling a termination of her pregnancy soon.      [facility] shared detailed information about options for fetal disposition. She is not interested in the hospital arranged burial program given the burial is here in the [facility] area and she and her family live 3 hours from here.  Hodan does plan private arrangements with cremation for her baby.  Hodan's mother is assisting with contacting  homes in their home area to determine costs.  [facility] also suggested the possibility of choosing a licensed  home in the [facility] for the cremation to avoid a transportation fee.  Hodan will continue to think about these options.    Hodan does not yet know if she will need overnight lodging. This will depend upon scheduling.  [facility] informed Hodan there are hotels near to the hospital with discounted hospital rates.      [facility] provided Hodan with my direct contact information and encouraged her to call anytime with needs.    ANOOP Van[facility]  Clinical   Maternal Child Health  Phone:  [phone]  Pager:  [phone]     "

## 2023-05-04 ENCOUNTER — HOSPITAL ENCOUNTER (OUTPATIENT)
Dept: ULTRASOUND IMAGING | Facility: OTHER | Age: 43
Discharge: HOME | End: 2023-05-04
Attending: STUDENT IN AN ORGANIZED HEALTH CARE EDUCATION/TRAINING PROGRAM | Admitting: STUDENT IN AN ORGANIZED HEALTH CARE EDUCATION/TRAINING PROGRAM
Payer: COMMERCIAL

## 2023-05-04 DIAGNOSIS — O35.11X0 FETUS WITH TRISOMY 13, SINGLE GESTATION: ICD-10-CM

## 2023-05-04 PROCEDURE — 76815 OB US LIMITED FETUS(S): CPT

## 2023-05-05 ENCOUNTER — TELEPHONE (OUTPATIENT)
Dept: OBGYN | Facility: CLINIC | Age: 43
End: 2023-05-05
Payer: COMMERCIAL

## 2023-05-05 ENCOUNTER — MYC MEDICAL ADVICE (OUTPATIENT)
Dept: MATERNAL FETAL MEDICINE | Facility: CLINIC | Age: 43
End: 2023-05-05
Payer: COMMERCIAL

## 2023-05-05 NOTE — TELEPHONE ENCOUNTER
Type of surgery: gyn  Location of surgery: [facility]  Date and time of surgery: 5/15/23 3:05PM  Surgeon: Dr. Rupali De Luna  Pre-Op Appt Date: surgeon  Post-Op Appt Date: n/a   Packet sent out: Yes  Pre-cert/Authorization completed:  Not Applicable  Date: 5/5/23

## 2023-05-06 ENCOUNTER — HEALTH MAINTENANCE LETTER (OUTPATIENT)
Age: 43
End: 2023-05-06

## 2023-05-09 ENCOUNTER — TELEPHONE (OUTPATIENT)
Dept: OBGYN | Facility: CLINIC | Age: 43
End: 2023-05-09
Payer: COMMERCIAL

## 2023-05-09 LAB
KARYOTYP CVS: NORMAL
LABORATORY REPORT: NORMAL

## 2023-05-09 NOTE — TELEPHONE ENCOUNTER
Hodan Iniguez is a 42 year old  at 14w3d as dated by 8w2d US with pregnancy complicated by fetus with trisomy 13 (elevated risk of T13 on NIPT followed by [facility] with abnormal FISH consistent with T13, ultrasound at 12w2d showed cystic hygroma and orofacial clefting) who is planning termination with D&E, scheduled on 5/15 with me.     2023 1:21 PM   I called Hodan to discuss upcoming procedure, risks, benefits, alternatives, recovery and plans for fetal remains. No answer, voicemail box was full. I will try calling later.    Rupali De Luna MD       23:fluorescence in situ hybridization (FISH) results for chromosomes 13, 18, 21, X and Y from her chorionic vill us sampling.     Results are ABNORMAL, showing three signals for chromosome 13, which is consistent with a preliminary diagnosis of trisomy 13There were two signals for chromosomes 21, 18, and the sex chromosomes. These results should match the final chromosome results greater than 99% of the time. Given the ultrasound findings consistent with trisomy 13, some patients may feel comfortable making decisions based on this preliminary result alone. The final chromosome result should be completed in approximately 10-12 days.     OB History    Para Term  AB Living   2 1 1 0 0 1   SAB IAB Ectopic Multiple Live Births   0 0 0 0 1      # Outcome Date GA Lbr Rommel/2nd Weight Sex Delivery Anes PTL Lv   2 Current            1 Term 22 39w3d  4.023 kg (8 lb 13.9 oz) M CS-LTranv Spinal N PATRICIA      Name: LAUREMALE-HODAN      Apgar1: 7  Apgar5: 8   h/o CS for cat 2 remote from delivery    GYN History:  No history of STIs  Last pap smear: May 2019 NIL, HPV negative (due May 2024- postpartum)  No history of abnormal pap smears    Past Medical History:   Diagnosis Date     Anxiety disorder     No Comments Provided     Depressive disorder      Gastro-esophageal reflux disease without esophagitis     No Comments Provided     Hypothyroidism     No  Comments Provided     Infertility, female     Never had a full work up, thought she may have PCOS     Migraines      Varicella     as a child     Past Surgical History:   Procedure Laterality Date      SECTION N/A 2022    Procedure:  SECTION;  Surgeon: Paulette rArieta MD;  Location:  OR     CHOLECYSTECTOMY      No Comments Provided     Current Outpatient Medications   Medication     levothyroxine (SYNTHROID/LEVOTHROID) 175 MCG tablet     metFORMIN (GLUCOPHAGE) 500 MG tablet     norethindrone (MICRONOR) 0.35 MG tablet     Prenatal Vit-Fe Fumarate-FA (PRENATAL MULTIVITAMIN W/IRON) 27-0.8 MG tablet     prochlorperazine (COMPAZINE) 10 MG tablet     promethazine (PHENERGAN) 25 MG tablet     sertraline (ZOLOFT) 100 MG tablet     No current facility-administered medications for this visit.      No Known Allergies

## 2023-05-09 NOTE — TELEPHONE ENCOUNTER
Hodan Iniguez is a 42 year old  at 14w3d as dated by 8w2d US with pregnancy complicated by fetus with trisomy 13 (elevated risk of T13 on NIPT followed by [facility] with abnormal FISH consistent with T13, ultrasound at 12w2d showed cystic hygroma and orofacial clefting) who is planning termination with D&E, scheduled on 5/15 with me.      2023 1:21 PM   I called Hodan to discuss upcoming procedure, risks, benefits, alternatives, recovery and plans for fetal remains. No answer, voicemail box was full. I will try calling later.    2023 5:09 PM  I tried calling Hodan again, no answer, voicemail box is still full. I sent her a [employer] message.     Rupali De Luna MD

## 2023-05-10 ENCOUNTER — TELEPHONE (OUTPATIENT)
Dept: MATERNAL FETAL MEDICINE | Facility: CLINIC | Age: 43
End: 2023-05-10
Payer: COMMERCIAL

## 2023-05-10 NOTE — TELEPHONE ENCOUNTER
May 10, 2023    I called Hodan to discuss the final chromosome results from her CVS.     Results are 47,XY,+13, which further confirms the diagnosis of trisomy 13. 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 We discussed the remote possibility of placental/fetal discrepancy. However, this not expected given the positive NIPT, FISH result, karyotype, and fetal involvement.    Given Hodan's age, this result is not expected to increase recurrence in a future pregnancy.     Hodan expressed sadness at hearing this result as she was still hoping that there could be a miracle. She thanked me and stated that she still plans to move forward with termination.     I encouraged Hodan to reach out to me if there is anything at all that I can do for her going forward.     Tiffany Pruitt MS, [facility]  Licensed Genetic Counselor  [facility]  Maternal Fetal Medicine  [email or web address]  [phone]

## 2023-05-12 NOTE — TELEPHONE ENCOUNTER
I tried calling patient 5/12/2023 3:36 PM to discuss Monday's procedure, no answer. Voicemail box full.  Rupali De Luna MD

## 2023-05-14 ENCOUNTER — ANESTHESIA EVENT (OUTPATIENT)
Dept: SURGERY | Facility: CLINIC | Age: 43
End: 2023-05-14
Payer: MEDICAID

## 2023-05-14 NOTE — ANESTHESIA PREPROCEDURE EVALUATION
Anesthesia Pre-Procedure Evaluation    Patient: Hodan Iniguez   MRN: [id number] : 1980        Procedure : D&E          Past Medical History:   Diagnosis Date     Anxiety disorder     No Comments Provided     Depressive disorder      Gastro-esophageal reflux disease without esophagitis     No Comments Provided     Hypothyroidism     No Comments Provided     Infertility, female     Never had a full work up, thought she may have PCOS     Migraines      Varicella     as a child      Past Surgical History:   Procedure Laterality Date      SECTION N/A 2022    Procedure:  SECTION;  Surgeon: Paulette Arrieta MD;  Location: [facility] OR     CHOLECYSTECTOMY      No Comments Provided      No Known Allergies   Social History     Tobacco Use     Smoking status: Never     Smokeless tobacco: Never   Vaping Use     Vaping status: Never Used     Passive vaping exposure: Yes   Substance Use Topics     Alcohol use: Not Currently     Alcohol/week: 24.0 standard drinks of alcohol     Types: 24 Cans of beer per week      Wt Readings from Last 1 Encounters:   05/15/23 104.4 kg (230 lb 2.6 oz)        Anesthesia Evaluation   Pt has had prior anesthetic.     No history of anesthetic complications       ROS/MED HX  ENT/Pulmonary:  - neg pulmonary ROS     Neurologic:  - neg neurologic ROS     Cardiovascular:  - neg cardiovascular ROS     METS/Exercise Tolerance: >4 METS    Hematologic:  - neg hematologic  ROS     Musculoskeletal:  - neg musculoskeletal ROS     GI/Hepatic:     (+) GERD, Asymptomatic on medication,     Renal/Genitourinary:  - neg Renal ROS     Endo:     (+) thyroid problem, hypothyroidism, Obesity,     Psychiatric/Substance Use:     (+) psychiatric history depression     Infectious Disease:  - neg infectious disease ROS     Malignancy:  - neg malignancy ROS     Other:            Physical Exam    Airway        Mallampati: II   TM distance: > 3 FB   Neck ROM: full   Mouth opening: > 3 cm    Respiratory  Devices and Support         Dental  no notable dental history     (+) Completely normal teeth      Cardiovascular   cardiovascular exam normal       Rhythm and rate: regular and normal     Pulmonary   pulmonary exam normal        breath sounds clear to auscultation           OUTSIDE LABS:  CBC:   Lab Results   Component Value Date    WBC 9.4 04/10/2023    WBC 13.9 (H) 05/18/2022    HGB 12.1 04/10/2023    HGB 9.1 (L) 05/20/2022    HCT 36.3 04/10/2023    HCT 38.0 05/18/2022     04/10/2023     05/18/2022     BMP:   Lab Results   Component Value Date     04/08/2021     05/06/2019    POTASSIUM 3.8 04/08/2021    POTASSIUM 3.4 (L) 05/06/2019    CHLORIDE 107 04/08/2021    CHLORIDE 106 05/06/2019    CO2 24 04/08/2021    CO2 27 05/06/2019    BUN 20 04/08/2021    BUN 20 05/06/2019    CR 0.81 05/06/2022    CR 0.72 04/08/2021    GLC 78 05/19/2022     04/08/2021     COAGS: No results found for: PTT, INR, FIBR  POC:   Lab Results   Component Value Date    HCG Negative 06/01/2022     HEPATIC:   Lab Results   Component Value Date    ALBUMIN 3.2 (L) 05/06/2022    PROTTOTAL 6.1 (L) 05/06/2022    ALT 9 05/06/2022    AST 14 05/06/2022    ALKPHOS 115 (H) 05/06/2022    BILITOTAL 0.2 (L) 05/06/2022     OTHER:   Lab Results   Component Value Date    A1C 5.4 04/08/2021    SARATH 9.0 04/08/2021    TSH 1.38 04/10/2023    T4 1.58 04/10/2023    T3 111 10/12/2021       Anesthesia Plan    ASA Status:  3   NPO Status:  NPO Appropriate    Anesthesia Type: MAC.   Induction: Intravenous, Propofol.   Maintenance: Balanced.        Consents    Anesthesia Plan(s) and associated risks, benefits, and realistic alternatives discussed. Questions answered and patient/representative(s) expressed understanding.     - Discussed: Risks, Benefits and Alternatives for BOTH SEDATION and the PROCEDURE were discussed     - Discussed with:  Patient      - Extended Intubation/Ventilatory Support Discussed: No.      - Patient is DNR/DNI  Status: No    Use of blood products discussed: No .     Postoperative Care    Pain management: IV analgesics, Multi-modal analgesia.   PONV prophylaxis: Ondansetron (or other 5HT-3)     Comments:    Other Comments: 42 year old female with hx of depression and hypothyroidism, obesity, BMI 39, now with known fetal anomaly for D&E.                Familia Stewart MD

## 2023-05-15 ENCOUNTER — ANESTHESIA (OUTPATIENT)
Dept: SURGERY | Facility: CLINIC | Age: 43
End: 2023-05-15
Payer: MEDICAID

## 2023-05-15 ENCOUNTER — HOSPITAL ENCOUNTER (OUTPATIENT)
Facility: CLINIC | Age: 43
Discharge: HOME | End: 2023-05-15
Attending: OBSTETRICS & GYNECOLOGY | Admitting: OBSTETRICS & GYNECOLOGY
Payer: MEDICAID

## 2023-05-15 VITALS
HEIGHT: 64 IN | DIASTOLIC BLOOD PRESSURE: 75 MMHG | TEMPERATURE: 98 F | HEART RATE: 82 BPM | BODY MASS INDEX: 39.29 KG/M2 | RESPIRATION RATE: 16 BRPM | WEIGHT: 230.16 LBS | SYSTOLIC BLOOD PRESSURE: 119 MMHG | OXYGEN SATURATION: 96 %

## 2023-05-15 DIAGNOSIS — G89.18 ACUTE POST-OPERATIVE PAIN: Primary | ICD-10-CM

## 2023-05-15 LAB
ABO/RH(D): NORMAL
ABO/RH(D): NORMAL
ANTIBODY SCREEN: NEGATIVE
BASOPHILS # BLD AUTO: 0 10E3/UL (ref 0–0.2)
BASOPHILS NFR BLD AUTO: 0 %
EOSINOPHIL # BLD AUTO: 0.1 10E3/UL (ref 0–0.7)
EOSINOPHIL NFR BLD AUTO: 1 %
ERYTHROCYTE [DISTWIDTH] IN BLOOD BY AUTOMATED COUNT: 13.4 % (ref 10–15)
HCT VFR BLD AUTO: 38 % (ref 35–47)
HGB BLD-MCNC: 12.7 G/DL (ref 11.7–15.7)
HOLD SPECIMEN: NORMAL
IMM GRANULOCYTES # BLD: 0.1 10E3/UL
IMM GRANULOCYTES NFR BLD: 1 %
LYMPHOCYTES # BLD AUTO: 1.7 10E3/UL (ref 0.8–5.3)
LYMPHOCYTES NFR BLD AUTO: 19 %
MCH RBC QN AUTO: 29.9 PG (ref 26.5–33)
MCHC RBC AUTO-ENTMCNC: 33.4 G/DL (ref 31.5–36.5)
MCV RBC AUTO: 89 FL (ref 78–100)
MONOCYTES # BLD AUTO: 1.2 10E3/UL (ref 0–1.3)
MONOCYTES NFR BLD AUTO: 13 %
NEUTROPHILS # BLD AUTO: 6.2 10E3/UL (ref 1.6–8.3)
NEUTROPHILS NFR BLD AUTO: 66 %
NRBC # BLD AUTO: 0 10E3/UL
NRBC BLD AUTO-RTO: 0 /100
PLATELET # BLD AUTO: 246 10E3/UL (ref 150–450)
RBC # BLD AUTO: 4.25 10E6/UL (ref 3.8–5.2)
SPECIMEN EXPIRATION DATE: NORMAL
SPECIMEN EXPIRATION DATE: NORMAL
WBC # BLD AUTO: 9.3 10E3/UL (ref 4–11)

## 2023-05-15 PROCEDURE — 710N000012 HC RECOVERY PHASE 2, PER MINUTE: Performed by: OBSTETRICS & GYNECOLOGY

## 2023-05-15 PROCEDURE — 88300 SURGICAL PATH GROSS: CPT | Mod: 26 | Performed by: PATHOLOGY

## 2023-05-15 PROCEDURE — 250N000011 HC RX IP 250 OP 636: Performed by: ANESTHESIOLOGY

## 2023-05-15 PROCEDURE — 76998 US GUIDE INTRAOP: CPT | Mod: 26 | Performed by: OBSTETRICS & GYNECOLOGY

## 2023-05-15 PROCEDURE — 250N000011 HC RX IP 250 OP 636: Performed by: NURSE ANESTHETIST, CERTIFIED REGISTERED

## 2023-05-15 PROCEDURE — 88300 SURGICAL PATH GROSS: CPT | Mod: TC | Performed by: OBSTETRICS & GYNECOLOGY

## 2023-05-15 PROCEDURE — 250N000009 HC RX 250: Performed by: OBSTETRICS & GYNECOLOGY

## 2023-05-15 PROCEDURE — 250N000011 HC RX IP 250 OP 636: Performed by: OBSTETRICS & GYNECOLOGY

## 2023-05-15 PROCEDURE — 59841 INDUCED ABORTION DILAT&EVAC: CPT | Performed by: OBSTETRICS & GYNECOLOGY

## 2023-05-15 PROCEDURE — 250N000009 HC RX 250: Performed by: NURSE ANESTHETIST, CERTIFIED REGISTERED

## 2023-05-15 PROCEDURE — 370N000017 HC ANESTHESIA TECHNICAL FEE, PER MIN: Performed by: OBSTETRICS & GYNECOLOGY

## 2023-05-15 PROCEDURE — 360N000075 HC SURGERY LEVEL 2, PER MIN: Performed by: OBSTETRICS & GYNECOLOGY

## 2023-05-15 PROCEDURE — 258N000003 HC RX IP 258 OP 636: Performed by: NURSE ANESTHETIST, CERTIFIED REGISTERED

## 2023-05-15 PROCEDURE — 250N000013 HC RX MED GY IP 250 OP 250 PS 637: Performed by: OBSTETRICS & GYNECOLOGY

## 2023-05-15 PROCEDURE — 85025 COMPLETE CBC W/AUTO DIFF WBC: CPT | Performed by: OBSTETRICS & GYNECOLOGY

## 2023-05-15 PROCEDURE — 272N000001 HC OR GENERAL SUPPLY STERILE: Performed by: OBSTETRICS & GYNECOLOGY

## 2023-05-15 PROCEDURE — 258N000003 HC RX IP 258 OP 636: Performed by: OBSTETRICS & GYNECOLOGY

## 2023-05-15 PROCEDURE — 999N000141 HC STATISTIC PRE-PROCEDURE NURSING ASSESSMENT: Performed by: OBSTETRICS & GYNECOLOGY

## 2023-05-15 PROCEDURE — 36415 COLL VENOUS BLD VENIPUNCTURE: CPT | Performed by: OBSTETRICS & GYNECOLOGY

## 2023-05-15 PROCEDURE — 86850 RBC ANTIBODY SCREEN: CPT | Performed by: OBSTETRICS & GYNECOLOGY

## 2023-05-15 PROCEDURE — 86901 BLOOD TYPING SEROLOGIC RH(D): CPT | Performed by: OBSTETRICS & GYNECOLOGY

## 2023-05-15 RX ORDER — ACETAMINOPHEN 325 MG/1
975 TABLET ORAL ONCE
Status: COMPLETED | OUTPATIENT
Start: 2023-05-15 | End: 2023-05-15

## 2023-05-15 RX ORDER — SODIUM CHLORIDE, SODIUM LACTATE, POTASSIUM CHLORIDE, CALCIUM CHLORIDE 600; 310; 30; 20 MG/100ML; MG/100ML; MG/100ML; MG/100ML
INJECTION, SOLUTION INTRAVENOUS CONTINUOUS PRN
Status: DISCONTINUED | OUTPATIENT
Start: 2023-05-15 | End: 2023-05-15

## 2023-05-15 RX ORDER — ACETAMINOPHEN 325 MG/1
975 TABLET ORAL ONCE
Status: DISCONTINUED | OUTPATIENT
Start: 2023-05-15 | End: 2023-05-15 | Stop reason: HOSPADM

## 2023-05-15 RX ORDER — ACETAMINOPHEN 325 MG/1
975 TABLET ORAL EVERY 6 HOURS PRN
Qty: 50 TABLET | Refills: 0 | COMMUNITY
Start: 2023-05-15

## 2023-05-15 RX ORDER — ACETAMINOPHEN 325 MG/1
975 TABLET ORAL EVERY 6 HOURS PRN
Qty: 50 TABLET | Refills: 0 | Status: SHIPPED | OUTPATIENT
Start: 2023-05-15 | End: 2023-05-15

## 2023-05-15 RX ORDER — ONDANSETRON 4 MG/1
4 TABLET, ORALLY DISINTEGRATING ORAL EVERY 30 MIN PRN
Status: DISCONTINUED | OUTPATIENT
Start: 2023-05-15 | End: 2023-05-15 | Stop reason: HOSPADM

## 2023-05-15 RX ORDER — MISOPROSTOL 200 UG/1
800 TABLET ORAL ONCE
Status: COMPLETED | OUTPATIENT
Start: 2023-05-15 | End: 2023-05-15

## 2023-05-15 RX ORDER — FENTANYL CITRATE 50 UG/ML
50 INJECTION, SOLUTION INTRAMUSCULAR; INTRAVENOUS EVERY 5 MIN PRN
Status: DISCONTINUED | OUTPATIENT
Start: 2023-05-15 | End: 2023-05-15 | Stop reason: HOSPADM

## 2023-05-15 RX ORDER — HYDROMORPHONE HYDROCHLORIDE 1 MG/ML
0.4 INJECTION, SOLUTION INTRAMUSCULAR; INTRAVENOUS; SUBCUTANEOUS EVERY 5 MIN PRN
Status: DISCONTINUED | OUTPATIENT
Start: 2023-05-15 | End: 2023-05-15 | Stop reason: HOSPADM

## 2023-05-15 RX ORDER — LIDOCAINE HYDROCHLORIDE 20 MG/ML
INJECTION, SOLUTION INFILTRATION; PERINEURAL PRN
Status: DISCONTINUED | OUTPATIENT
Start: 2023-05-15 | End: 2023-05-15

## 2023-05-15 RX ORDER — IBUPROFEN 800 MG/1
800 TABLET, FILM COATED ORAL ONCE
Status: COMPLETED | OUTPATIENT
Start: 2023-05-15 | End: 2023-05-15

## 2023-05-15 RX ORDER — FENTANYL CITRATE 50 UG/ML
25 INJECTION, SOLUTION INTRAMUSCULAR; INTRAVENOUS EVERY 5 MIN PRN
Status: DISCONTINUED | OUTPATIENT
Start: 2023-05-15 | End: 2023-05-15 | Stop reason: HOSPADM

## 2023-05-15 RX ORDER — PROPOFOL 10 MG/ML
INJECTION, EMULSION INTRAVENOUS CONTINUOUS PRN
Status: DISCONTINUED | OUTPATIENT
Start: 2023-05-15 | End: 2023-05-15

## 2023-05-15 RX ORDER — IBUPROFEN 800 MG/1
800 TABLET, FILM COATED ORAL EVERY 6 HOURS PRN
Qty: 30 TABLET | Refills: 0 | Status: SHIPPED | OUTPATIENT
Start: 2023-05-15 | End: 2023-05-15

## 2023-05-15 RX ORDER — ONDANSETRON 2 MG/ML
INJECTION INTRAMUSCULAR; INTRAVENOUS PRN
Status: DISCONTINUED | OUTPATIENT
Start: 2023-05-15 | End: 2023-05-15

## 2023-05-15 RX ORDER — SODIUM CHLORIDE, SODIUM LACTATE, POTASSIUM CHLORIDE, CALCIUM CHLORIDE 600; 310; 30; 20 MG/100ML; MG/100ML; MG/100ML; MG/100ML
INJECTION, SOLUTION INTRAVENOUS CONTINUOUS
Status: DISCONTINUED | OUTPATIENT
Start: 2023-05-15 | End: 2023-05-15 | Stop reason: HOSPADM

## 2023-05-15 RX ORDER — HYDROMORPHONE HYDROCHLORIDE 1 MG/ML
0.2 INJECTION, SOLUTION INTRAMUSCULAR; INTRAVENOUS; SUBCUTANEOUS EVERY 5 MIN PRN
Status: DISCONTINUED | OUTPATIENT
Start: 2023-05-15 | End: 2023-05-15 | Stop reason: HOSPADM

## 2023-05-15 RX ORDER — IBUPROFEN 800 MG/1
800 TABLET, FILM COATED ORAL EVERY 6 HOURS PRN
Qty: 30 TABLET | Refills: 0 | COMMUNITY
Start: 2023-05-15

## 2023-05-15 RX ORDER — FENTANYL CITRATE 50 UG/ML
INJECTION, SOLUTION INTRAMUSCULAR; INTRAVENOUS PRN
Status: DISCONTINUED | OUTPATIENT
Start: 2023-05-15 | End: 2023-05-15

## 2023-05-15 RX ORDER — LIDOCAINE HYDROCHLORIDE AND EPINEPHRINE 10; 10 MG/ML; UG/ML
INJECTION, SOLUTION INFILTRATION; PERINEURAL PRN
Status: DISCONTINUED | OUTPATIENT
Start: 2023-05-15 | End: 2023-05-15 | Stop reason: HOSPADM

## 2023-05-15 RX ORDER — ONDANSETRON 2 MG/ML
4 INJECTION INTRAMUSCULAR; INTRAVENOUS EVERY 30 MIN PRN
Status: DISCONTINUED | OUTPATIENT
Start: 2023-05-15 | End: 2023-05-15 | Stop reason: HOSPADM

## 2023-05-15 RX ADMIN — MISOPROSTOL 800 MCG: 200 TABLET ORAL at 13:11

## 2023-05-15 RX ADMIN — MIDAZOLAM 2 MG: 1 INJECTION INTRAMUSCULAR; INTRAVENOUS at 14:45

## 2023-05-15 RX ADMIN — ONDANSETRON 4 MG: 2 INJECTION INTRAMUSCULAR; INTRAVENOUS at 15:34

## 2023-05-15 RX ADMIN — FENTANYL CITRATE 50 MCG: 50 INJECTION, SOLUTION INTRAMUSCULAR; INTRAVENOUS at 14:53

## 2023-05-15 RX ADMIN — IBUPROFEN 800 MG: 400 TABLET, FILM COATED ORAL at 16:08

## 2023-05-15 RX ADMIN — FENTANYL CITRATE 50 MCG: 50 INJECTION, SOLUTION INTRAMUSCULAR; INTRAVENOUS at 14:45

## 2023-05-15 RX ADMIN — HYDROMORPHONE HYDROCHLORIDE 0.5 MG: 1 INJECTION, SOLUTION INTRAMUSCULAR; INTRAVENOUS; SUBCUTANEOUS at 15:16

## 2023-05-15 RX ADMIN — ACETAMINOPHEN 975 MG: 325 TABLET ORAL at 13:12

## 2023-05-15 RX ADMIN — FENTANYL CITRATE 50 MCG: 50 INJECTION INTRAMUSCULAR; INTRAVENOUS at 16:12

## 2023-05-15 RX ADMIN — PROPOFOL 125 MCG/KG/MIN: 10 INJECTION, EMULSION INTRAVENOUS at 14:53

## 2023-05-15 RX ADMIN — LIDOCAINE HYDROCHLORIDE 80 MG: 20 INJECTION, SOLUTION INFILTRATION; PERINEURAL at 14:52

## 2023-05-15 RX ADMIN — SODIUM CHLORIDE, POTASSIUM CHLORIDE, SODIUM LACTATE AND CALCIUM CHLORIDE: 600; 310; 30; 20 INJECTION, SOLUTION INTRAVENOUS at 14:45

## 2023-05-15 RX ADMIN — DOXYCYCLINE 200 MG: 100 INJECTION, POWDER, LYOPHILIZED, FOR SOLUTION INTRAVENOUS at 13:44

## 2023-05-15 ASSESSMENT — ACTIVITIES OF DAILY LIVING (ADL)
ADLS_ACUITY_SCORE: 35
ADLS_ACUITY_SCORE: 35

## 2023-05-15 NOTE — DISCHARGE INSTRUCTIONS

## 2023-05-15 NOTE — OP NOTE
[facility]  OPERATIVE NOTE: DILATION AND EVACUATION     PATIENT: Hodan Iniguez  MRN: [id number]     DATE OF PROCEDURE: 5/15/2023     PROCEDURE: EUA, Dilation and Evacuation under ultrasound guidance  PRE-OPERATIVE DIAGNOSIS:   1. Single intrauterine pregnancy at 15w2d   2. Fetus with trisomy 13  3. Desire for termination of pregnancy    POST-OPERATIVE DIAGNOSIS:   Same, s/p procedure    SURGEON: Rupali De Luna MD   ASSISTANT(S): Karoline Odonnell MD  ANESTHESIA: MAC, cervical block  EBL: 50 mL   IVF: 200 mL LR   UOP: not measured   COMPLICATIONS: None apparent   SPECIMEN(S):   1. Products of conception    INDICATIONS: Hodan Iniguez is a 42 year old  at 15w2d with fetal diagnosis of trisomy 13. Patient desired termination of pregnancy.   We reviewed alternatives including expectant management and induction of labor. After counseling regarding these findings, the patient has decided to terminate the pregnancy with dilation and evacuation.  The patient was counseled on risks, benefits and alternatives to the above listed procedure. Reviewed risks of procedure including but not limited to, risk of anesthesia, bleeding, infection, injury to cervix, vagina or uterus, including perforation and the possible need for a more invasive surgery like an exploratory laparotomy, incomplete procedure, risk for intrauterine scarring.  Informed consent was signed prior to the procedure.    FINDINGS:   Normal appearing external genitalia and vaginal mucosa  Nulliparous cervix,  uterus in anteverted position, uterine size consistent with gestational age  Fetal tissue inspection at end was complete for gestational age - 4 fetal limbs, fetal spine fetal calvarium and placental tissue identified.   Thin EMS and fundus firm at end of D&E. Minimal bleeding.    PROCEDURE:   The patient was taken to the OR where she was induced under MAC anesthesia. She received misoprostol 800mcg buccal and doxycycline IV  prior the the start of the procedure. She was prepped and draped in the normal sterile fashion in low lithotomy position.     An open sided speculum was placed with good visualization of the cervix. The anterior lip of the cervix grasped with a tenaculum  Lidocaine 1% with epinephrine was injected to achieve a paracervical block, 10mL at each 4 and 8 o'clock. The cervix was easily dilated using the Bello dilators to 51 Palestinian under ultrasound guidance. Ultrasound guidance was used during all subsequent intrauterine instrumentation.     The 14mm curved suction cannula was placed without difficulty. Fluid was evacuated with suction at 60-70mm Hg. The narrow sopher forceps were introduced. Placenta and fetal parts were extracted from the uterus with the forceps under ultrasound guidance.  Suction reintroduced, uterus clamped down. A 10mm firm curved suction curette introduced and the suction cannula was rotated until a gritty texture was appreciated circumferentially. Sharp curettage was performed to confirm an empty uterine cavity. The suction was reintroduced to clear the uterus of any remaining blood and debris. All instruments were removed from the uterus. The tenaculum was removed. Silver nitrate and pressure applied to achieve hemostasis at tenaculum site. No bleeding from cervical os. The speculum was removed from the vagina. Ultrasound showed thin endometrial stripe.     Sponge, lap, instrument, and needle counts were correct x 2.  The patient tolerated the procedure well. After anesthesia reversal, patient transported to the PACU in stable condition.    Patient is Rh positive and Rhogam is note indicated.    The family was provided with a memory box and foot prints. They desire private disposition of fetal remains to a  home.  Dr. Brenda gray for hayward portions of this case. Surgical assistant was vital for ultrasound guidance as radiology nor a resident was available to assist.     Rupali COLLIER  MD [facility]

## 2023-05-15 NOTE — OR NURSING
Pt would like fetal remains transported to [facility]  [place] [address], [place] [address], phone [phone]

## 2023-05-15 NOTE — ANESTHESIA CARE TRANSFER NOTE
Patient: Hodan Iniguez    Procedure: Procedure(s):  Dilation and evacuation under ultrasound guidance       Diagnosis: Known fetal anomaly, antepartum, single or unspecified fetus [O35.9XX0]  Diagnosis Additional Information: No value filed.    Anesthesia Type:   MAC     Note:    Oropharynx: oropharynx clear of all foreign objects and spontaneously breathing  Level of Consciousness: awake  Oxygen Supplementation: room air    Independent Airway: airway patency satisfactory and stable  Dentition: dentition unchanged  Vital Signs Stable: post-procedure vital signs reviewed and stable  Report to RN Given: handoff report given  Patient transferred to: Phase II    Handoff Report: Identifed the Patient, Identified the Reponsible Provider, Reviewed the pertinent medical history, Discussed the surgical course, Reviewed Intra-OP anesthesia mangement and issues during anesthesia, Set expectations for post-procedure period and Allowed opportunity for questions and acknowledgement of understanding      Vitals:  Vitals Value Taken Time   /52 05/15/23 1547   Temp 36.5  C (97.7  F) 05/15/23 1547   Pulse 76 05/15/23 1547   Resp 16 05/15/23 1547   SpO2 94 % 05/15/23 1549   Vitals shown include unvalidated device data.    Electronically Signed By: JOSE JUAN Chavez CRNA  May 15, 2023  3:50 PM

## 2023-05-15 NOTE — H&P
[facility] History and Physical    Hodan Iniguez MRN# [id number]   Age: 42 year old YOB: 1980     Date of Admission:  5/15/2023  Primary care provider: Toña Beckham          Assessment and Plan:   Assessment:  Hodan Iniguez is a 42 year old  at 15w2d here for D&E for pregnancy termination for fetal anomalies, trisomy 13.     Plan:  Plan dilation and evacuation under ultrasound guidance  Already had genetic testing  Desires disposition to private  home for cremation  Reviewed risks, benefits, alternatives, procedure and recovery.  Risks include bleeding, infection, uterine perforation, cervical laceration, incomplete procedure, injury to other organs, VTE, risks of anesthesia.             Chief Complaint:   Pre op D&E             History of Present Illness:   This patient is a 42 year old  at 15w2d here for D&E for pregnancy termination for trisomy 13.     Ultrasound 34 showed  1) Mac intrauterine pregnancy at 12w 2d gestational age.  2) A cystic hygroma is noted.  3) Fetal orofacial clefting is noted on today's US.    [facility] showed trisomy 13  47,XY,+13            Past Medical History:     Past Medical History:   Diagnosis Date     Anxiety disorder     No Comments Provided     Depressive disorder      Gastro-esophageal reflux disease without esophagitis     No Comments Provided     Hypothyroidism     No Comments Provided     Infertility, female     Never had a full work up, thought she may have PCOS     Migraines      Varicella     as a child             Past Surgical History:     Past Surgical History:   Procedure Laterality Date      SECTION N/A 2022    Procedure:  SECTION;  Surgeon: Paulette Arrieta MD;  Location: [facility] OR     CHOLECYSTECTOMY      No Comments Provided             Obstetrical History:       OB History    Para Term  AB Living   2 1 1 0 0 1   SAB IAB Ectopic Multiple Live Births   0 0 0 0 1      # Outcome  "Date GA Lbr Rommel/2nd Weight Sex Delivery Anes PTL Lv   2 Current            1 Term 05/19/22 39w3d  4.023 kg (8 lb 13.9 oz) M CS-LTranv Spinal N PATRICIA      Name: CARLA KELSEY-LEONEL      Apgar1: 7  Apgar5: 8               Social History:   Works as a  at [employer]  Lives with parents, aunt and child  Social History     Tobacco Use     Smoking status: Never     Smokeless tobacco: Never   Vaping Use     Vaping status: Never Used     Passive vaping exposure: Yes   Substance Use Topics     Alcohol use: Not Currently     Alcohol/week: 24.0 standard drinks of alcohol     Types: 24 Cans of beer per week             Family History:     Family History   Problem Relation Age of Onset     Hypertension Mother         Hypertension     Mental Illness Mother         Mental illness,sees a therapist     Other - See Comments Mother         MTHFR     Cancer Mother         uterine/endometrial     Prostate Cancer Father         Cancer-prostate     Hypertension Father         Hypertension     Infertility Sister      Thyroid Disease Sister      Infertility Sister      Thyroid Disease Sister      Mental Illness Brother         Mental illness,anger, therapy     Cancer Paternal Grandmother 80        Cancer,lung cancer smoker     Lymphoma Paternal Aunt              Allergies:   No Known Allergies           Medications:     Current Facility-Administered Medications   Medication     doxycycline (VIBRAMYCIN) 200 mg in sodium chloride 0.9 % 250 mL intermittent infusion             Review of Systems:    ROS: 10 point ROS neg other than the symptoms noted above in the HPI.         Physical Exam:   All vitals have been reviewed  Patient Vitals for the past 24 hrs:   BP Temp Temp src Pulse Resp SpO2 Height Weight   05/15/23 1259 128/83 97.9  F (36.6  C) Oral 84 20 100 % 1.626 m (5' 4\") 104.4 kg (230 lb 2.6 oz)     Gen: alert, oriented, no distress, very pleasant, appears stated age, well groomed  CV: normal heart sounds, regular rate and rhythm, no " murmurs  Resp: good inspiratory effort, lungs clear to ascultation bilaterally, no wheezes or rhonchi  Extr: warm, well perfused, nontender, no edema  Psych: affect bright, cooperative, responds appropriately           Data:   All laboratory data reviewed  Results for orders placed or performed during the hospital encounter of 05/15/23   CBC with platelets and differential     Status: None   Result Value Ref Range    WBC Count 9.3 4.0 - 11.0 10e3/uL    RBC Count 4.25 3.80 - 5.20 10e6/uL    Hemoglobin 12.7 11.7 - 15.7 g/dL    Hematocrit 38.0 35.0 - 47.0 %    MCV 89 78 - 100 fL    MCH 29.9 26.5 - 33.0 pg    MCHC 33.4 31.5 - 36.5 g/dL    RDW 13.4 10.0 - 15.0 %    Platelet Count 246 150 - 450 10e3/uL    % Neutrophils 66 %    % Lymphocytes 19 %    % Monocytes 13 %    % Eosinophils 1 %    % Basophils 0 %    % Immature Granulocytes 1 %    NRBCs per 100 WBC 0 <1 /100    Absolute Neutrophils 6.2 1.6 - 8.3 10e3/uL    Absolute Lymphocytes 1.7 0.8 - 5.3 10e3/uL    Absolute Monocytes 1.2 0.0 - 1.3 10e3/uL    Absolute Eosinophils 0.1 0.0 - 0.7 10e3/uL    Absolute Basophils 0.0 0.0 - 0.2 10e3/uL    Absolute Immature Granulocytes 0.1 <=0.4 10e3/uL    Absolute NRBCs 0.0 10e3/uL   Adult Type and Screen     Status: None (Preliminary result)   Result Value Ref Range    SPECIMEN EXPIRATION DATE [id number]    ABO/Rh type and screen     Status: None (In process)    Narrative    The following orders were created for panel order ABO/Rh type and screen.  Procedure                               Abnormality         Status                     ---------                               -----------         ------                     Adult Type and Screen[[id number]]                            Preliminary result           Please view results for these tests on the individual orders.   CBC with platelets differential     Status: None    Narrative    The following orders were created for panel order CBC with platelets differential.  Procedure                                Abnormality         Status                     ---------                               -----------         ------                     CBC with platelets and d...[[id number]]                      Final result                 Please view results for these tests on the individual orders.     Attestation:  I have reviewed today's vital signs, notes, medications, labs and imaging.  Amount of time performed on this history and physical: 20 minutes.    Rupali De Luna MD

## 2023-05-15 NOTE — ANESTHESIA POSTPROCEDURE EVALUATION
Patient: Hodan Iniguez    Procedure: Procedure(s):  Dilation and evacuation under ultrasound guidance       Anesthesia Type:  MAC    Note:  Disposition: Outpatient   Postop Pain Control: Uneventful            Sign Out: Well controlled pain   PONV: No   Neuro/Psych: Uneventful            Sign Out: Acceptable/Baseline neuro status   Airway/Respiratory: Uneventful            Sign Out: Acceptable/Baseline resp. status   CV/Hemodynamics: Uneventful            Sign Out: Acceptable CV status; No obvious hypovolemia; No obvious fluid overload   Other NRE: NONE   DID A NON-ROUTINE EVENT OCCUR? No           Last vitals:  Vitals Value Taken Time   /75 05/15/23 1645   Temp 36.7  C (98  F) 05/15/23 1630   Pulse 82 05/15/23 1645   Resp 16 05/15/23 1645   SpO2 96 % 05/15/23 1645       Electronically Signed By: Freddie Conway MD  May 15, 2023  4:45 PM

## 2023-05-18 LAB
PATH REPORT.COMMENTS IMP SPEC: NORMAL
PATH REPORT.COMMENTS IMP SPEC: NORMAL
PATH REPORT.FINAL DX SPEC: NORMAL
PATH REPORT.GROSS SPEC: NORMAL
PATH REPORT.RELEVANT HX SPEC: NORMAL
PHOTO IMAGE: NORMAL

## 2023-07-10 DIAGNOSIS — Z98.891 S/P CESAREAN SECTION: ICD-10-CM

## 2023-07-10 RX ORDER — ACETAMINOPHEN AND CODEINE PHOSPHATE 120; 12 MG/5ML; MG/5ML
SOLUTION ORAL
Qty: 84 TABLET | Refills: 4 | Status: SHIPPED | OUTPATIENT
Start: 2023-07-10 | End: 2023-12-22

## 2023-07-10 NOTE — TELEPHONE ENCOUNTER
Jac[facility]  sent Rx request for the following:      Requested Prescriptions   Pending Prescriptions Disp Refills     norethindrone (MICRONOR) 0.35 MG tablet [Pharmacy Med Name: NORETHINDRONE 0.35MG TABLETS 28S] 84 tablet 4     Sig: TAKE 1 TABLET(0.35 MG) BY MOUTH DAILY       Contraceptives Protocol Failed - 7/10/2023 11:21 AM        Failed - No active pregnancy on record        Last Prescription Date:   06/1/22  Last Fill Qty/Refills:         84, R-4  Last Office Visit:              03/27/23   Future Office visit:           None    Hallie Epstein, RN on 7/10/2023 at 11:49 AM

## 2023-07-13 ENCOUNTER — TELEPHONE (OUTPATIENT)
Dept: OBGYN | Facility: CLINIC | Age: 43
End: 2023-07-13
Payer: COMMERCIAL

## 2023-07-13 NOTE — TELEPHONE ENCOUNTER
Moriah from financing for the [employer] is calling as there is not a medical necessity form on file for Hodan. I do not have one retained in the office at all. Is this something we can get filled out soon for Hodan?    Completed forms can be sent to [phone] via fax. Moriah can be reached for questions at [phone]

## 2023-07-14 NOTE — TELEPHONE ENCOUNTER
I just printed and signed the form. We will fax it from [facility] to [facility] and to [place].   Rupali De Luna MD

## 2023-10-12 DIAGNOSIS — E03.9 HYPOTHYROIDISM, UNSPECIFIED TYPE: ICD-10-CM

## 2023-10-12 RX ORDER — LEVOTHYROXINE SODIUM 175 UG/1
TABLET ORAL
Qty: 90 TABLET | Refills: 1 | Status: SHIPPED | OUTPATIENT
Start: 2023-10-12 | End: 2023-12-22 | Stop reason: DRUGHIGH

## 2023-10-12 NOTE — TELEPHONE ENCOUNTER
Jacoby sent Rx request for the following:      Requested Prescriptions   Pending Prescriptions Disp Refills    levothyroxine (SYNTHROID/LEVOTHROID) 175 MCG tablet [Pharmacy Med Name: LEVOTHYROXINE 0.175MG (175MCG) TABS] 90 tablet 1     Sig: TAKE 1 TABLET(175 MCG) BY MOUTH DAILY       Thyroid Protocol Failed - 10/12/2023  1:09 PM   Last Prescription Date:   4/24/23  Last Fill Qty/Refills:         90, R-1    Last Office Visit:              10/28/22   Future Office visit:           none     This patient is not pregnant. Routing to PCP for consideration.    Gregoria Kamara RN on 10/12/2023 at 1:21 PM

## 2023-10-31 DIAGNOSIS — Z32.01 PREGNANCY TEST POSITIVE: ICD-10-CM

## 2023-11-07 RX ORDER — PRENATAL VIT/IRON FUM/FOLIC AC 27MG-0.8MG
1 TABLET ORAL DAILY
Qty: 90 TABLET | Refills: 3 | Status: SHIPPED | OUTPATIENT
Start: 2023-11-07

## 2023-11-07 NOTE — TELEPHONE ENCOUNTER
Prenatal Vit-Fe Fumarate-FA (PRENATAL MULTIVITAMIN W/IRON) 27-0.8 MG tablet     Sig: Take 1 tablet by mouth daily         Last Written Prescription Date:  11/30/23  Last Fill Quantity: 90,   # refills: 3  Last Office Visit: 3/27/23  Future Office visit:       Routing refill request to provider for review/approval because:  Drug not on the [facility], [facility] or [facility] refill protocol or controlled substance Andreina Ramirez RN on 11/7/2023 at 9:36 AM

## 2023-11-22 DIAGNOSIS — F33.1 MODERATE EPISODE OF RECURRENT MAJOR DEPRESSIVE DISORDER (H): ICD-10-CM

## 2023-11-22 DIAGNOSIS — F41.9 MODERATE ANXIETY: ICD-10-CM

## 2023-11-22 RX ORDER — SERTRALINE HYDROCHLORIDE 100 MG/1
200 TABLET, FILM COATED ORAL DAILY
Qty: 180 TABLET | Refills: 0 | Status: SHIPPED | OUTPATIENT
Start: 2023-11-22 | End: 2023-12-22

## 2023-11-22 NOTE — TELEPHONE ENCOUNTER
Called patient and is aware. Will call back to schedule . Hilary Jorge LPN ....................11/22/2023  4:38 PM

## 2023-11-22 NOTE — TELEPHONE ENCOUNTER
[facility]oby [place] sent Rx request for the following:      Requested Prescriptions   Pending Prescriptions Disp Refills    sertraline (ZOLOFT) 100 MG tablet [Pharmacy Med Name: SERTRALINE 100MG TABLETS] 180 tablet 3     Sig: TAKE 2 TABLETS(200 MG) BY MOUTH DAILY       SSRIs Protocol Failed - 11/22/2023  9:49 AM        Failed - PHQ-9 score less than 5 in past 6 months     Please review last PHQ-9 score.           Failed - Recent (6 mo) or future (30 days) visit within the authorizing provider's specialty       Last Prescription Date:   12/5/23  Last Fill Qty/Refills:         180, R-3    Last Office Visit:              10/28/22   Future Office visit:           none    Routing refill request to provider for review/approval because:  Drug not on the [facility] refill protocol     Hillary Coretz RN on 11/22/2023 at 2:05 PM

## 2023-11-22 NOTE — TELEPHONE ENCOUNTER
Refill medication.  Due for physical and med recheck.  Toña Beckham PA-C.......... 11/22/2023  4:32 PM

## 2023-12-15 ASSESSMENT — ANXIETY QUESTIONNAIRES
5. BEING SO RESTLESS THAT IT IS HARD TO SIT STILL: NEARLY EVERY DAY
1. FEELING NERVOUS, ANXIOUS, OR ON EDGE: NEARLY EVERY DAY
7. FEELING AFRAID AS IF SOMETHING AWFUL MIGHT HAPPEN: NEARLY EVERY DAY
IF YOU CHECKED OFF ANY PROBLEMS ON THIS QUESTIONNAIRE, HOW DIFFICULT HAVE THESE PROBLEMS MADE IT FOR YOU TO DO YOUR WORK, TAKE CARE OF THINGS AT HOME, OR GET ALONG WITH OTHER PEOPLE: SOMEWHAT DIFFICULT
6. BECOMING EASILY ANNOYED OR IRRITABLE: NEARLY EVERY DAY
GAD7 TOTAL SCORE: 21
2. NOT BEING ABLE TO STOP OR CONTROL WORRYING: NEARLY EVERY DAY
4. TROUBLE RELAXING: NEARLY EVERY DAY
3. WORRYING TOO MUCH ABOUT DIFFERENT THINGS: NEARLY EVERY DAY

## 2023-12-21 ASSESSMENT — PATIENT HEALTH QUESTIONNAIRE - PHQ9
SUM OF ALL RESPONSES TO PHQ QUESTIONS 1-9: 11
SUM OF ALL RESPONSES TO PHQ QUESTIONS 1-9: 11
10. IF YOU CHECKED OFF ANY PROBLEMS, HOW DIFFICULT HAVE THESE PROBLEMS MADE IT FOR YOU TO DO YOUR WORK, TAKE CARE OF THINGS AT HOME, OR GET ALONG WITH OTHER PEOPLE: SOMEWHAT DIFFICULT

## 2023-12-21 ASSESSMENT — ANXIETY QUESTIONNAIRES
1. FEELING NERVOUS, ANXIOUS, OR ON EDGE: SEVERAL DAYS
GAD7 TOTAL SCORE: 7
3. WORRYING TOO MUCH ABOUT DIFFERENT THINGS: SEVERAL DAYS
7. FEELING AFRAID AS IF SOMETHING AWFUL MIGHT HAPPEN: SEVERAL DAYS
GAD7 TOTAL SCORE: 7
2. NOT BEING ABLE TO STOP OR CONTROL WORRYING: SEVERAL DAYS
6. BECOMING EASILY ANNOYED OR IRRITABLE: SEVERAL DAYS
5. BEING SO RESTLESS THAT IT IS HARD TO SIT STILL: SEVERAL DAYS
IF YOU CHECKED OFF ANY PROBLEMS ON THIS QUESTIONNAIRE, HOW DIFFICULT HAVE THESE PROBLEMS MADE IT FOR YOU TO DO YOUR WORK, TAKE CARE OF THINGS AT HOME, OR GET ALONG WITH OTHER PEOPLE: SOMEWHAT DIFFICULT
4. TROUBLE RELAXING: SEVERAL DAYS

## 2023-12-22 ENCOUNTER — OFFICE VISIT (OUTPATIENT)
Dept: FAMILY MEDICINE | Facility: OTHER | Age: 43
End: 2023-12-22
Attending: NURSE PRACTITIONER
Payer: COMMERCIAL

## 2023-12-22 ENCOUNTER — MYC MEDICAL ADVICE (OUTPATIENT)
Dept: PULMONOLOGY | Facility: OTHER | Age: 43
End: 2023-12-22

## 2023-12-22 VITALS
HEART RATE: 84 BPM | OXYGEN SATURATION: 98 % | HEIGHT: 65 IN | DIASTOLIC BLOOD PRESSURE: 90 MMHG | SYSTOLIC BLOOD PRESSURE: 136 MMHG | RESPIRATION RATE: 18 BRPM | WEIGHT: 211 LBS | TEMPERATURE: 97.7 F | BODY MASS INDEX: 35.16 KG/M2

## 2023-12-22 DIAGNOSIS — E28.2 PCOS (POLYCYSTIC OVARIAN SYNDROME): ICD-10-CM

## 2023-12-22 DIAGNOSIS — E55.9 VITAMIN D DEFICIENCY: ICD-10-CM

## 2023-12-22 DIAGNOSIS — E66.09 CLASS 2 OBESITY DUE TO EXCESS CALORIES WITHOUT SERIOUS COMORBIDITY WITH BODY MASS INDEX (BMI) OF 35.0 TO 35.9 IN ADULT: ICD-10-CM

## 2023-12-22 DIAGNOSIS — F33.1 MODERATE EPISODE OF RECURRENT MAJOR DEPRESSIVE DISORDER (H): ICD-10-CM

## 2023-12-22 DIAGNOSIS — Z00.00 ROUTINE GENERAL MEDICAL EXAMINATION AT A HEALTH CARE FACILITY: Primary | ICD-10-CM

## 2023-12-22 DIAGNOSIS — E66.812 CLASS 2 OBESITY DUE TO EXCESS CALORIES WITHOUT SERIOUS COMORBIDITY WITH BODY MASS INDEX (BMI) OF 35.0 TO 35.9 IN ADULT: ICD-10-CM

## 2023-12-22 DIAGNOSIS — Z11.3 SCREEN FOR STD (SEXUALLY TRANSMITTED DISEASE): ICD-10-CM

## 2023-12-22 DIAGNOSIS — F41.9 MODERATE ANXIETY: ICD-10-CM

## 2023-12-22 DIAGNOSIS — Z12.4 CERVICAL CANCER SCREENING: ICD-10-CM

## 2023-12-22 DIAGNOSIS — E03.9 HYPOTHYROIDISM, UNSPECIFIED TYPE: ICD-10-CM

## 2023-12-22 DIAGNOSIS — R06.83 SNORING: ICD-10-CM

## 2023-12-22 PROBLEM — Z36.89 ENCOUNTER FOR TRIAGE IN PREGNANT PATIENT: Status: RESOLVED | Noted: 2022-05-06 | Resolved: 2023-12-22

## 2023-12-22 LAB
ALBUMIN SERPL BCG-MCNC: 4.7 G/DL (ref 3.5–5.2)
ALP SERPL-CCNC: 104 U/L (ref 40–150)
ALT SERPL W P-5'-P-CCNC: 19 U/L (ref 0–50)
ANION GAP SERPL CALCULATED.3IONS-SCNC: 10 MMOL/L (ref 7–15)
AST SERPL W P-5'-P-CCNC: 19 U/L (ref 0–45)
BACTERIAL VAGINOSIS VAG-IMP: NEGATIVE
BASOPHILS # BLD AUTO: 0 10E3/UL (ref 0–0.2)
BASOPHILS NFR BLD AUTO: 1 %
BILIRUB SERPL-MCNC: 0.2 MG/DL
BUN SERPL-MCNC: 18.3 MG/DL (ref 6–20)
C TRACH DNA SPEC QL PROBE+SIG AMP: NEGATIVE
CALCIUM SERPL-MCNC: 10.2 MG/DL (ref 8.6–10)
CANDIDA DNA VAG QL NAA+PROBE: NOT DETECTED
CANDIDA GLABRATA / CANDIDA KRUSEI DNA: NOT DETECTED
CHLORIDE SERPL-SCNC: 105 MMOL/L (ref 98–107)
CREAT SERPL-MCNC: 0.71 MG/DL (ref 0.51–0.95)
DEPRECATED HCO3 PLAS-SCNC: 25 MMOL/L (ref 22–29)
EGFRCR SERPLBLD CKD-EPI 2021: >90 ML/MIN/1.73M2
EOSINOPHIL # BLD AUTO: 0.2 10E3/UL (ref 0–0.7)
EOSINOPHIL NFR BLD AUTO: 3 %
ERYTHROCYTE [DISTWIDTH] IN BLOOD BY AUTOMATED COUNT: 13.2 % (ref 10–15)
GLUCOSE SERPL-MCNC: 97 MG/DL (ref 70–99)
HBA1C MFR BLD: 5.4 % (ref 4–6.2)
HCT VFR BLD AUTO: 40.5 % (ref 35–47)
HGB BLD-MCNC: 13.3 G/DL (ref 11.7–15.7)
IMM GRANULOCYTES # BLD: 0 10E3/UL
IMM GRANULOCYTES NFR BLD: 0 %
LYMPHOCYTES # BLD AUTO: 1.9 10E3/UL (ref 0.8–5.3)
LYMPHOCYTES NFR BLD AUTO: 31 %
MCH RBC QN AUTO: 29.6 PG (ref 26.5–33)
MCHC RBC AUTO-ENTMCNC: 32.8 G/DL (ref 31.5–36.5)
MCV RBC AUTO: 90 FL (ref 78–100)
MONOCYTES # BLD AUTO: 0.5 10E3/UL (ref 0–1.3)
MONOCYTES NFR BLD AUTO: 9 %
N GONORRHOEA DNA SPEC QL NAA+PROBE: NEGATIVE
NEUTROPHILS # BLD AUTO: 3.6 10E3/UL (ref 1.6–8.3)
NEUTROPHILS NFR BLD AUTO: 56 %
NRBC # BLD AUTO: 0 10E3/UL
NRBC BLD AUTO-RTO: 0 /100
PLATELET # BLD AUTO: 320 10E3/UL (ref 150–450)
POTASSIUM SERPL-SCNC: 4.4 MMOL/L (ref 3.4–5.3)
PROT SERPL-MCNC: 7.4 G/DL (ref 6.4–8.3)
RBC # BLD AUTO: 4.49 10E6/UL (ref 3.8–5.2)
SODIUM SERPL-SCNC: 140 MMOL/L (ref 135–145)
T VAGINALIS DNA VAG QL NAA+PROBE: NOT DETECTED
T4 FREE SERPL-MCNC: 1.82 NG/DL (ref 0.9–1.7)
TSH SERPL DL<=0.005 MIU/L-ACNC: 0.1 UIU/ML (ref 0.3–4.2)
WBC # BLD AUTO: 6.3 10E3/UL (ref 4–11)

## 2023-12-22 PROCEDURE — 99396 PREV VISIT EST AGE 40-64: CPT | Performed by: NURSE PRACTITIONER

## 2023-12-22 PROCEDURE — 87491 CHLMYD TRACH DNA AMP PROBE: CPT | Mod: ZL | Performed by: NURSE PRACTITIONER

## 2023-12-22 PROCEDURE — 99213 OFFICE O/P EST LOW 20 MIN: CPT | Mod: 25 | Performed by: NURSE PRACTITIONER

## 2023-12-22 PROCEDURE — 90686 IIV4 VACC NO PRSV 0.5 ML IM: CPT

## 2023-12-22 PROCEDURE — 84443 ASSAY THYROID STIM HORMONE: CPT | Mod: ZL | Performed by: NURSE PRACTITIONER

## 2023-12-22 PROCEDURE — 87624 HPV HI-RISK TYP POOLED RSLT: CPT | Mod: ZL | Performed by: NURSE PRACTITIONER

## 2023-12-22 PROCEDURE — 84439 ASSAY OF FREE THYROXINE: CPT | Mod: ZL | Performed by: NURSE PRACTITIONER

## 2023-12-22 PROCEDURE — 87591 N.GONORRHOEAE DNA AMP PROB: CPT | Mod: ZL | Performed by: NURSE PRACTITIONER

## 2023-12-22 PROCEDURE — G0123 SCREEN CERV/VAG THIN LAYER: HCPCS | Performed by: NURSE PRACTITIONER

## 2023-12-22 PROCEDURE — 91320 SARSCV2 VAC 30MCG TRS-SUC IM: CPT

## 2023-12-22 PROCEDURE — 0352U MULTIPLEX VAGINAL PANEL BY PCR: CPT | Mod: ZL | Performed by: NURSE PRACTITIONER

## 2023-12-22 PROCEDURE — 83036 HEMOGLOBIN GLYCOSYLATED A1C: CPT | Mod: ZL | Performed by: NURSE PRACTITIONER

## 2023-12-22 PROCEDURE — 80053 COMPREHEN METABOLIC PANEL: CPT | Mod: ZL | Performed by: NURSE PRACTITIONER

## 2023-12-22 PROCEDURE — 36415 COLL VENOUS BLD VENIPUNCTURE: CPT | Mod: ZL | Performed by: NURSE PRACTITIONER

## 2023-12-22 PROCEDURE — G0463 HOSPITAL OUTPT CLINIC VISIT: HCPCS | Mod: 25

## 2023-12-22 PROCEDURE — G0008 ADMIN INFLUENZA VIRUS VAC: HCPCS

## 2023-12-22 PROCEDURE — 82306 VITAMIN D 25 HYDROXY: CPT | Mod: ZL | Performed by: NURSE PRACTITIONER

## 2023-12-22 PROCEDURE — 85025 COMPLETE CBC W/AUTO DIFF WBC: CPT | Mod: ZL | Performed by: NURSE PRACTITIONER

## 2023-12-22 RX ORDER — METFORMIN HCL 500 MG
500 TABLET, EXTENDED RELEASE 24 HR ORAL 2 TIMES DAILY WITH MEALS
Qty: 180 TABLET | Refills: 3 | Status: SHIPPED | OUTPATIENT
Start: 2023-12-22

## 2023-12-22 RX ORDER — SERTRALINE HYDROCHLORIDE 100 MG/1
200 TABLET, FILM COATED ORAL DAILY
Qty: 180 TABLET | Refills: 3 | Status: SHIPPED | OUTPATIENT
Start: 2023-12-22

## 2023-12-22 RX ORDER — LEVOTHYROXINE SODIUM 150 UG/1
150 TABLET ORAL DAILY
Qty: 90 TABLET | Refills: 0 | Status: SHIPPED | OUTPATIENT
Start: 2023-12-22

## 2023-12-22 ASSESSMENT — PAIN SCALES - GENERAL: PAINLEVEL: NO PAIN (0)

## 2023-12-22 NOTE — NURSING NOTE
"Chief Complaint   Patient presents with    Recheck Medication     Recheck and refill medication    STD check       Medication reconciliation completed.    FOOD SECURITY SCREENING QUESTIONS:    The next two questions are to help us understand your food security.  If you are feeling you need any assistance in this area, we have resources available to support you today.    Hunger Vital Signs:  Within the past 12 months we worried whether our food would run out before we got money to buy more. Never  Within the past 12 months the food we bought just didn't last and we didn't have money to get more. Never    Initial BP (!) 136/90 (BP Location: Right arm, Patient Position: Sitting, Cuff Size: Adult Regular)   Pulse 84   Temp 97.7  F (36.5  C) (Temporal)   Resp 18   Ht 1.638 m (5' 4.5\")   Wt 95.7 kg (211 lb)   LMP 12/13/2023 (Exact Date)   SpO2 98%   Breastfeeding No   BMI 35.66 kg/m   Estimated body mass index is 35.66 kg/m  as calculated from the following:    Height as of this encounter: 1.638 m (5' 4.5\").    Weight as of this encounter: 95.7 kg (211 lb).       Judith Miner LPN .......  12/22/2023  8:57 AM    "

## 2023-12-22 NOTE — PROGRESS NOTES
SUBJECTIVE:   Hodan is a 43 year old, presenting for the following:  Recheck Medication (Recheck and refill medication    STD check)        HPI    Today's PHQ-9 Score:       12/21/2023     1:00 PM   PHQ-9 SCORE   PHQ-9 Total Score Brendonhart 11 (Moderate depression)   PHQ-9 Total Score 11     She presents to clinic today for annual wellness exam.  She reports has been taking metformin twice daily, does not feel that this has been helping her lose weight.  She does not do any routine exercise but is busy at work and home.  She walks between 5 and 15,000 steps a day.  She reports she is eating less than normal, eats a lot of chicken at dinner, does snack on cookies and beef jerky throughout the day.  She has been having diarrhea several times a day, history of gallbladder surgery.  Unsure if this started about the time metformin started or not.  She has been snoring, wakes herself up by snoring at times.  She is fatigued throughout the day but she is also dealing with depression that has not been well-controlled.  She currently takes sertraline 200 mg daily.  She is not working with any mental health providers but is interested in getting this set up.  She reports that mental health has been affected by having a busy toddler, not sleeping well, losing her baby earlier this year.  Social History     Tobacco Use    Smoking status: Never    Smokeless tobacco: Never   Substance Use Topics    Alcohol use: Not Currently     Alcohol/week: 24.0 standard drinks of alcohol     Types: 24 Cans of beer per week             5/23/2023     9:42 AM   Alcohol Use   Prescreen: >3 drinks/day or >7 drinks/week? No     Reviewed orders with patient.  Reviewed health maintenance and updated orders accordingly - Yes  BP Readings from Last 3 Encounters:   12/22/23 (!) 136/90   05/15/23 119/75   03/27/23 128/76    Wt Readings from Last 3 Encounters:   12/22/23 95.7 kg (211 lb)   05/15/23 104.4 kg (230 lb 2.6 oz)   03/27/23 104.7 kg (230 lb  14.4 oz)                  Patient Active Problem List   Diagnosis    Hypothyroidism    Major depression, recurrent (H24)    Alcohol dependence (H)    Hypothyroidism affecting pregnancy in third trimester    Polyhydramnios in third trimester complication, single or unspecified fetus    Morbid obesity (H)    PCOS (polycystic ovarian syndrome)    Moderate anxiety     Past Surgical History:   Procedure Laterality Date     SECTION N/A 2022    Procedure:  SECTION;  Surgeon: Paulette Arrieta MD;  Location: [facility] OR    CHOLECYSTECTOMY      No Comments Provided    DILATION AND EVACUATION N/A 5/15/2023    Procedure: Dilation and evacuation under ultrasound guidance;  Surgeon: Rupali De Luna MD;  Location:  OR       Social History     Tobacco Use    Smoking status: Never    Smokeless tobacco: Never   Substance Use Topics    Alcohol use: Not Currently     Alcohol/week: 24.0 standard drinks of alcohol     Types: 24 Cans of beer per week     Family History   Problem Relation Age of Onset    Hypertension Mother         Hypertension    Mental Illness Mother         Mental illness,sees a therapist    Other - See Comments Mother         MTHFR    Cancer Mother         uterine/endometrial    Prostate Cancer Father         Cancer-prostate    Hypertension Father         Hypertension    Infertility Sister     Thyroid Disease Sister     Infertility Sister     Thyroid Disease Sister     Mental Illness Brother         Mental illness,anger, therapy    Cancer Paternal Grandmother 80        Cancer,lung cancer smoker    Lymphoma Paternal Aunt          Current Outpatient Medications   Medication Sig Dispense Refill    acetaminophen (TYLENOL) 325 MG tablet Take 3 tablets (975 mg) by mouth every 6 hours as needed for mild pain 50 tablet 0    ibuprofen (ADVIL/MOTRIN) 800 MG tablet Take 1 tablet (800 mg) by mouth every 6 hours as needed for other (mild and/or inflammatory pain) 30 tablet 0    levothyroxine  (SYNTHROID/LEVOTHROID) 175 MCG tablet TAKE 1 TABLET(175 MCG) BY MOUTH DAILY 90 tablet 1    metFORMIN (GLUCOPHAGE XR) 500 MG 24 hr tablet Take 1 tablet (500 mg) by mouth 2 times daily (with meals) 180 tablet 3    Prenatal Vit-Fe Fumarate-FA (PRENATAL MULTIVITAMIN W/IRON) 27-0.8 MG tablet TAKE 1 TABLET BY MOUTH DAILY 90 tablet 3    sertraline (ZOLOFT) 100 MG tablet Take 2 tablets (200 mg) by mouth daily 180 tablet 3     No Known Allergies    Breast Cancer Screening:    FHS-7:        No data to display                Mammogram Screening - Offered annual screening and updated Health Maintenance for mutual plan based on risk factor consideration  Pertinent mammograms are reviewed under the imaging tab.    History of abnormal Pap smear: NO - age 30-65 PAP every 5 years with negative HPV co-testing recommended      Latest Ref Rng & Units 2019     9:50 AM   PAP / HPV   PAP (Historical)  NIL    HPV 16 DNA NEG^Negative Negative    HPV 18 DNA NEG^Negative Negative    Other HR HPV NEG^Negative Negative      Reviewed and updated as needed this visit by clinical staff   Tobacco  Allergies  Meds  Problems  Med Hx  Surg Hx  Fam Hx  Soc   Hx        Reviewed and updated as needed this visit by Provider   Tobacco  Allergies  Meds  Problems  Med Hx  Surg Hx  Fam Hx         Past Medical History:   Diagnosis Date    Anxiety disorder     No Comments Provided    Depressive disorder     Gastro-esophageal reflux disease without esophagitis     No Comments Provided    Hypothyroidism     No Comments Provided    Infertility, female     Never had a full work up, thought she may have PCOS    Migraines     Varicella     as a child      Past Surgical History:   Procedure Laterality Date     SECTION N/A 2022    Procedure:  SECTION;  Surgeon: Paulette Arrieta MD;  Location: [facility] OR    CHOLECYSTECTOMY      No Comments Provided    DILATION AND EVACUATION N/A 5/15/2023    Procedure: Dilation and evacuation under  "ultrasound guidance;  Surgeon: Rupali De Luna MD;  Location: [facility]       Review of Systems  Normal other than listed above     OBJECTIVE:   BP (!) 136/90 (BP Location: Right arm, Patient Position: Sitting, Cuff Size: Adult Regular)   Pulse 84   Temp 97.7  F (36.5  C) (Temporal)   Resp 18   Ht 1.638 m (5' 4.5\")   Wt 95.7 kg (211 lb)   LMP 12/13/2023 (Exact Date)   SpO2 98%   Breastfeeding No   BMI 35.66 kg/m    Physical Exam  GENERAL: healthy, alert and no distress  EYES: Eyes grossly normal to inspection, PERRL and conjunctivae and sclerae normal  HENT: ear canals and TM's normal, nose and mouth without ulcers or lesions  NECK: no adenopathy, no asymmetry, masses, or scars and thyroid normal to palpation  RESP: lungs clear to auscultation - no rales, rhonchi or wheezes  BREAST: normal without masses, tenderness or nipple discharge and no palpable axillary masses or adenopathy  CV: regular rate and rhythm, normal S1 S2, no S3 or S4, no murmur, click or rub, no peripheral edema and peripheral pulses strong  ABDOMEN: soft, nontender, no hepatosplenomegaly, no masses and bowel sounds normal   (female): normal female external genitalia, normal urethral meatus, vaginal mucosa pink, moist, well rugated, and normal cervix/adnexa/uterus without masses.  Moderate amount white discharge appreciated.  Pap smear with HPV, vaginal swabs for yeast, bacterial vaginosis and STD screening obtained.  MS: no gross musculoskeletal defects noted, no edema  SKIN: no suspicious lesions or rashes  NEURO: Normal strength and tone, mentation intact and speech normal  PSYCH: mentation appears normal, affect normal/bright    Diagnostic Test Results:  Labs reviewed in Epic    ASSESSMENT/PLAN:       ICD-10-CM    1. Routine general medical examination at a health care facility  Z00.00 CBC and Differential      2. Moderate episode of recurrent major depressive disorder (H)  F33.1 sertraline (ZOLOFT) 100 MG tablet      3. " Moderate anxiety  F41.9 sertraline (ZOLOFT) 100 MG tablet      4. Snoring  R06.83 Adult Sleep Eval & Management  Referral      5. Hypothyroidism, unspecified type  E03.9 TSH Reflex [facility]      6. Class 2 obesity due to excess calories without serious comorbidity with body mass index (BMI) of 35.0 to 35.9 in adult  E66.09 Hemoglobin A1c    Z68.35 Comprehensive Metabolic Panel      7. PCOS (polycystic ovarian syndrome)  E28.2 metFORMIN (GLUCOPHAGE XR) 500 MG 24 hr tablet      8. Vitamin D deficiency  E55.9 Vitamin D Total      9. Screen for STD (sexually transmitted disease)  Z11.3 GC/Chlamydia by PCR     Multiplex Vaginal Panel by PCR     CANCELED: Multiplex Vaginal Panel by PCR      10. Cervical cancer screening  Z12.4 Pap Screen with HPV - recommended age 30 - 65 years        Routine labs updated today including TSH, A1c, CBC and CMP.  Will follow-up with results when available.  TSH is low, updated prescription for levothyroxine 150 mcg daily.  Plan to recheck this as a lab only appointment in 6 weeks.  Refilled sertraline, if labs are all stable, discussed further medication adjustment either through primary care or mental health  Class II obesity, PCOS.  Metformin may be the cause of her diarrhea, will do metformin 500 mg extended release twice daily to see if this helps with diarrhea.  Will follow-up with vitamin D level when available.  STD screening, follow-up with results when available  Cervical cancer screening completed, we will follow-up with results when available  With snoring and daytime fatigue as well as trouble losing weight, sleep referral placed.  COVID and influenza vaccines updated today    Patient has been advised of split billing requirements and indicates understanding: Yes    Depression Screening Follow Up        12/21/2023     1:00 PM   PHQ   PHQ-9 Total Score 11   Q9: Thoughts of better off dead/self-harm past 2 weeks Several days   F/U: Thoughts of suicide or self-harm Yes   F/U:  "Self harm-plan No   F/U: Self-harm action No   F/U: Safety concerns No         Follow Up      Follow Up Actions Taken  Crisis resource information provided in the After Visit Summary  Patient declined referral.    Discussed the following ways the patient can remain in a safe environment:  be around others    COUNSELING:  Reviewed preventive health counseling, as reflected in patient instructions       Regular exercise       Healthy diet/nutrition       Vision screening      BMI:   Estimated body mass index is 35.66 kg/m  as calculated from the following:    Height as of this encounter: 1.638 m (5' 4.5\").    Weight as of this encounter: 95.7 kg (211 lb).   Weight management plan: Discussed healthy diet and exercise guidelines      She reports that she has never smoked. She has never used smokeless tobacco.          JOSE JUAN [facility] AND [facility]  "

## 2023-12-23 LAB — VIT D+METAB SERPL-MCNC: 29 NG/ML (ref 20–50)

## 2023-12-23 ASSESSMENT — ANXIETY QUESTIONNAIRES: GAD7 TOTAL SCORE: 7

## 2023-12-28 LAB
BKR LAB AP GYN ADEQUACY: NORMAL
BKR LAB AP GYN INTERPRETATION: NORMAL
BKR LAB AP HPV REFLEX: NORMAL
BKR LAB AP LMP: NORMAL
BKR LAB AP PREVIOUS ABNORMAL: NORMAL
PATH REPORT.COMMENTS IMP SPEC: NORMAL
PATH REPORT.COMMENTS IMP SPEC: NORMAL
PATH REPORT.RELEVANT HX SPEC: NORMAL

## 2023-12-31 ENCOUNTER — MYC MEDICAL ADVICE (OUTPATIENT)
Dept: FAMILY MEDICINE | Facility: OTHER | Age: 43
End: 2023-12-31
Payer: COMMERCIAL

## 2023-12-31 DIAGNOSIS — F41.9 MODERATE ANXIETY: ICD-10-CM

## 2023-12-31 DIAGNOSIS — F33.1 MODERATE EPISODE OF RECURRENT MAJOR DEPRESSIVE DISORDER (H): Primary | ICD-10-CM

## 2023-12-31 ASSESSMENT — SLEEP AND FATIGUE QUESTIONNAIRES
HOW LIKELY ARE YOU TO NOD OFF OR FALL ASLEEP WHILE WATCHING TV: SLIGHT CHANCE OF DOZING
HOW LIKELY ARE YOU TO NOD OFF OR FALL ASLEEP WHILE SITTING AND TALKING TO SOMEONE: SLIGHT CHANCE OF DOZING
HOW LIKELY ARE YOU TO NOD OFF OR FALL ASLEEP WHILE LYING DOWN TO REST IN THE AFTERNOON WHEN CIRCUMSTANCES PERMIT: HIGH CHANCE OF DOZING
HOW LIKELY ARE YOU TO NOD OFF OR FALL ASLEEP IN A CAR, WHILE STOPPED FOR A FEW MINUTES IN TRAFFIC: SLIGHT CHANCE OF DOZING
HOW LIKELY ARE YOU TO NOD OFF OR FALL ASLEEP WHILE SITTING QUIETLY AFTER LUNCH WITHOUT ALCOHOL: MODERATE CHANCE OF DOZING
HOW LIKELY ARE YOU TO NOD OFF OR FALL ASLEEP WHEN YOU ARE A PASSENGER IN A CAR FOR AN HOUR WITHOUT A BREAK: HIGH CHANCE OF DOZING
HOW LIKELY ARE YOU TO NOD OFF OR FALL ASLEEP WHILE SITTING AND READING: SLIGHT CHANCE OF DOZING
HOW LIKELY ARE YOU TO NOD OFF OR FALL ASLEEP WHILE SITTING INACTIVE IN A PUBLIC PLACE: SLIGHT CHANCE OF DOZING

## 2024-01-02 LAB
HUMAN PAPILLOMA VIRUS 16 DNA: NEGATIVE
HUMAN PAPILLOMA VIRUS 18 DNA: NEGATIVE
HUMAN PAPILLOMA VIRUS FINAL DIAGNOSIS: NORMAL
HUMAN PAPILLOMA VIRUS OTHER HR: NEGATIVE

## 2024-01-03 DIAGNOSIS — O99.281 HYPOTHYROIDISM AFFECTING PREGNANCY IN FIRST TRIMESTER: ICD-10-CM

## 2024-01-03 DIAGNOSIS — E03.9 HYPOTHYROIDISM AFFECTING PREGNANCY IN FIRST TRIMESTER: ICD-10-CM

## 2024-01-03 NOTE — PROGRESS NOTES
12/31/2023     2:06 PM    Three [place]aks Sleepiness Scale ( ADEEL Golden  [id number]<br>ESS - [place]/English - Final version - 21 Nov 07 - [facility] [place].)   Sitting and reading Slight chance of dozing   Watching TV Slight chance of dozing   Sitting, inactive in a public place (e.g. a theatre or a meeting) Slight chance of dozing   As a passenger in a car for an hour without a break High chance of dozing   Lying down to rest in the afternoon when circumstances permit High chance of dozing   Sitting and talking to someone Slight chance of dozing   Sitting quietly after a lunch without alcohol Moderate chance of dozing   In a car, while stopped for a few minutes in traffic Slight chance of dozing   Three [place]aks Score (MC) 13   Three [place]aks Score (Sleep) 13         12/31/2023     2:04 PM   Insomnia Severity Index (ARYA)   Difficulty falling asleep 2   Difficulty staying asleep 2   Problems waking up too early 2   How SATISFIED/DISSATISFIED are you with your CURRENT sleep pattern? 3   How NOTICEABLE to others do you think your sleep problem is in terms of impairing the quality of your life? 2   How WORRIED/DISTRESSED are you about your current sleep problem? 3   To what extent do you consider your sleep problem to INTERFERE with your daily functioning (e.g. daytime fatigue, mood, ability to function at work/daily chores, concentration, memory, mood, etc.) CURRENTLY? 4   ARYA Total Score 18         12/31/2023     2:05 PM   STOP BANG Questionnaire (  2008, the American Society of Anesthesiologists, Inc[employer]   1. Snoring - Do you snore loudly (louder than talking or loud enough to be heard through closed doors)? Yes   2. Tired - Do you often feel tired, fatigued, or sleepy during daytime? Yes   3. Observed - Has anyone observed you stop breathing during your sleep? No   4. Blood pressure - Do you have or are you being treated for high blood pressure? No   5. BMI - BMI more than 35 kg/m2? Yes   6. Age  - Age over 50 yr old? No   7. Neck circumference - Neck circumference greater than 40 cm? No   8. Gender - Gender male? No   STOP BANG Score (MC): 4 (High risk of PAT)       12/31/23 1404   Reason For Your Visit   Please briefly describe the main reason(s) for your sleep visit Snoring and just feeling exhausted all day   Approximately when did this problem start Cant remember   What are your goals for this visit Hoping for the implant for sleep apnea to stop snoring and help me sleep better   Time in Bed - Work Or School Days   Do you work or go to school Yes   What time do you usually get into bed It varies every night   About how long does it take you to fall asleep Depending on howntired a couple hours to a few minutes   How often do you have trouble falling asleep 4 to 5   How often do you wake up during the night 4 to 5 maybe   Do you work days/evenings/nights/rotating shifts Days   What wakes you up at night Snorting self awake;External stimuli (bed partner, pets, noise, etc)   How often do you have trouble falling back to sleep Depends   About how long does it take to fall back to sleep Depends   What do you usually do if you have trouble getting back to sleep Get on my phone   What time do you usually get out of bed to start your day 7 to 8 am   Do you use an alarm Yes   Time in Bed - Weekends/Non-work Days/All Other Days   What time do you usually get into bed Varies   About how long does it take you to fall asleep From hours to minutes   What time do you usually get out of bed to start your day 7 to 9 am   Do you use an alarm Yes   Sleep Need   On average, about how much sleep do you think you get 6 to 7 hrs   About how much sleep do you think you need 8 to 9 hrs   Sleep Position   Which sleep positions do you prefer Stomach   Do you do any of the following activities in bed Read;Eat;Watch TV;Use phone, computer, or tablet   How often do you take a nap on purpose Maybe 1 day a week if im marly   About how  long are your naps 1 to 2 hrs   Do you feel better after naps No   How often do you doze off unintentionally 0   Have you ever had a driving accident or near-miss due to sleepiness/drowsiness No   Sleep Disruptions - Breathing/Snoring   Do you snore Yes   Do other people complain about your snoring Yes   Have you been told you stop breathing in your sleep No   Do you have issues with any of the following Morning headaches;Heartburn or reflux at night;Getting up to urinate more than once   Sleep Disruptions - Movement   Do you get pain, discomfort, with an urge to move Yes   Does it happen when you are resting Yes   Does it get better if you move around No   Does it happen more at night Yes   Have you been told you kick your legs at night Yes   Sleep Disruptions - Behaviours in Sleep   Have you ever experienced any of the following during your sleep Recurring Nightmares;Sleep-talking;Teeth grinding;Kicking or punching;Waking up paralyzed;See or hear things that are not really there upon awakening or just falling asleep   Do you ever experience sudden muscle weakness during the day Yes   Caffeine, Alcohol and Other Substances   How many caffeinated beverages (coffee, tea, soda, energy drinks) per day 1 to 2   What time of day is your last caffeine use Varies   Do you drink alcohol to help you sleep No   Do you drink alcohol near bedtime No   Family History   Has any family member been diagnosed with a sleep disorder Yes   If yes, please indicate Sleep apnea   In the last TWO WEEKS have you experienced any of the following symptoms?   Sore Throat in Morning Yes   Heart Racing at Night Yes   Diarrhea at Night Yes   Heartburn at Night Yes   Urinating More than Once at Night Yes   Headaches in Morning Yes   Weakness in Arms or Legs Yes   Depressed Mood Yes   Anxiety Yes

## 2024-01-03 NOTE — TELEPHONE ENCOUNTER
"Chart review prior to sleep testing.    Patient Summary:  43 year old female who is referred for sleep-disordered breathing.    Patient Active Problem List    Diagnosis Date Noted    PCOS (polycystic ovarian syndrome) 10/29/2022     Priority: Medium    Moderate anxiety 10/29/2022     Priority: Medium    Morbid obesity (H) 10/28/2022     Priority: Medium    Hypothyroidism affecting pregnancy in third trimester 05/18/2022     Priority: Medium    Polyhydramnios in third trimester complication, single or unspecified fetus 05/18/2022     Priority: Medium    Alcohol dependence (H) 01/12/2022     Priority: Medium    Major depression, recurrent (H24) 01/04/2013     Priority: Medium    Hypothyroidism 09/24/2010     Priority: Medium       Current Outpatient Medications   Medication    acetaminophen (TYLENOL) 325 MG tablet    ibuprofen (ADVIL/MOTRIN) 800 MG tablet    levothyroxine (SYNTHROID/LEVOTHROID) 150 MCG tablet    metFORMIN (GLUCOPHAGE XR) 500 MG 24 hr tablet    Prenatal Vit-Fe Fumarate-FA (PRENATAL MULTIVITAMIN W/IRON) 27-0.8 MG tablet    sertraline (ZOLOFT) 100 MG tablet     No current facility-administered medications for this visit.       Pertinent PMHx of PCOS, obesity, alcohol dependence, depression.    STOP-BANG score of 4, with unknown neck circumference.  White score of 13.  ARYA: 18    BMI of Estimated body mass index is 35.66 kg/m  as calculated from the following:    Height as of an earlier encounter on 12/22/23: 1.638 m (5' 4.5\").    Weight as of an earlier encounter on 12/22/23: 95.7 kg (211 lb).     Chief concern per questionnaire: \"Snoring and just feeling exhausted all day \"    Duration of symptoms:  \"Cant remember \"    Goals for visit per questionnaire: \"Hoping for the implant for sleep apnea to stop snoring and help me sleep better \"    Sleep pattern:  Workdays.  \"Varies every night\" to 7-8am.  Weekends.  \"Varies\" to 7-9am.  Time to fall asleep: ~few minutes to hours.  Awakenings: 4-5 times per " night, ? minutes to return to sleep.  Yes for use of phone during awakenings.  Average total sleep time:  6-7 hours  Napping.  0-1 days per week, 1-2 hours per nap.    No for RLS screen.  No for sleep walking.  Yes for dream enactment behavior.  No for bruxism.  Yes to sleep paralysis, possible yes to hypnagogic / hypnopompic hallucinations    Yes for morning headaches.  Yes for snoring.  No for observed apnea.  Yes for FHx of PAT.    Caffeine use:  No for 3+ per day.  No for within 6 hours of bed.    A/P:    1.)  High likelihood of PAT with STOP-BANG score of 4.  - Unclear patient preference regarding upper airway stimulation, but given as a potential consideration for upper airway stimulation, recommend diagnostic in-lab PSG.  Orders placed.    Recommended Sleep Testing/Procedures:    Adult and PSG/Diagnostic (Bed 2 or Bed 1)              ---  This note was written with the assistance of the Dragon voice-dictation technology software. The final document, although reviewed, may contain errors. For corrections, please contact the office.    Kuldeep Diaz MD    Sleep Medicine  [facility]  - [place], [place]  Main Office: [phone]  [facility] [address], [address]  Schedule visits: [phone]  Main Office: [phone]  Fax: [phone]

## 2024-01-05 ENCOUNTER — TELEPHONE (OUTPATIENT)
Dept: PULMONOLOGY | Facility: OTHER | Age: 44
End: 2024-01-05

## 2024-01-08 RX ORDER — LEVOTHYROXINE SODIUM 150 UG/1
150 TABLET ORAL DAILY
Qty: 90 TABLET | Refills: 0 | Status: SHIPPED | OUTPATIENT
Start: 2024-01-08 | End: 2024-06-09

## 2024-01-08 NOTE — TELEPHONE ENCOUNTER
LEVOTHYROXINE 0.075MG (75MCG) TABS   Last Written Prescription Date:  12/22/23  Last Fill Quantity: 90,   # refills: 0  Last Office Visit: 12/22/23  Future Office visit:       Routing refill request to provider for review/approval because:  Drug not on the [facility], [facility] or [facility] refill protocol or controlled substance.  Patient has orders for future TSH.  Will forward to provider for consideration and request  to assist with lab appointment and follow up. Unable to complete prescription refill per RNMedication Refill Policy.................... Noelle Mcqueen RN ....................  1/8/2024   11:33 AM

## 2024-01-16 ENCOUNTER — LAB (OUTPATIENT)
Dept: LAB | Facility: OTHER | Age: 44
End: 2024-01-16
Payer: COMMERCIAL

## 2024-01-16 DIAGNOSIS — E03.9 HYPOTHYROIDISM, UNSPECIFIED TYPE: ICD-10-CM

## 2024-01-16 LAB — TSH SERPL DL<=0.005 MIU/L-ACNC: 0.52 UIU/ML (ref 0.3–4.2)

## 2024-01-16 PROCEDURE — 84443 ASSAY THYROID STIM HORMONE: CPT | Mod: ZL

## 2024-01-16 PROCEDURE — 36415 COLL VENOUS BLD VENIPUNCTURE: CPT | Mod: ZL

## 2024-01-17 NOTE — TELEPHONE ENCOUNTER
Marta Morel is a 51year old female underwent a transanal excision of anal canal polyp, excision and fulguration of multiple anal margin and anal mucosal skin lesions on March 29, 2019.  Final pathology revealed the lesion at the anal margin to be a Fibr Telephone visit scheduled with Toña Beckham NP to discuss symptoms and medication.    Zina Whitfield LPN............4/17/2020 3:41 PM     VIOLENT/AGGRESSIVE BEHAVIOR

## 2024-02-08 DIAGNOSIS — F41.9 MODERATE ANXIETY: ICD-10-CM

## 2024-02-08 DIAGNOSIS — F33.1 MODERATE EPISODE OF RECURRENT MAJOR DEPRESSIVE DISORDER (H): ICD-10-CM

## 2024-02-09 RX ORDER — SERTRALINE HYDROCHLORIDE 100 MG/1
TABLET, FILM COATED ORAL
Qty: 180 TABLET | Refills: 3 | OUTPATIENT
Start: 2024-02-09

## 2024-02-09 NOTE — TELEPHONE ENCOUNTER
[facility]      Requested Prescriptions     sertraline (ZOLOFT) 100 MG tablet 180 tablet 3 12/22/2023 -- No   Sig - Route: Take 2 tablets (200 mg) by mouth daily - Oral       Called pharmacy, they will transfer the refills.    Hillary Cortez RN on 2/9/2024 at 2:03 PM

## 2024-02-24 ENCOUNTER — HEALTH MAINTENANCE LETTER (OUTPATIENT)
Age: 44
End: 2024-02-24

## 2024-06-09 ENCOUNTER — MYC REFILL (OUTPATIENT)
Dept: FAMILY MEDICINE | Facility: OTHER | Age: 44
End: 2024-06-09
Payer: COMMERCIAL

## 2024-06-09 DIAGNOSIS — O99.281 HYPOTHYROIDISM AFFECTING PREGNANCY IN FIRST TRIMESTER: ICD-10-CM

## 2024-06-09 DIAGNOSIS — E03.9 HYPOTHYROIDISM AFFECTING PREGNANCY IN FIRST TRIMESTER: ICD-10-CM

## 2024-06-12 ENCOUNTER — MYC MEDICAL ADVICE (OUTPATIENT)
Dept: FAMILY MEDICINE | Facility: OTHER | Age: 44
End: 2024-06-12
Payer: COMMERCIAL

## 2024-06-12 RX ORDER — LEVOTHYROXINE SODIUM 150 UG/1
150 TABLET ORAL DAILY
Qty: 90 TABLET | Refills: 1 | Status: SHIPPED | OUTPATIENT
Start: 2024-06-12

## 2024-06-12 NOTE — TELEPHONE ENCOUNTER
[facility] [place] sent Rx request for the following:      Requested Prescriptions   Pending Prescriptions Disp Refills    levothyroxine (SYNTHROID/LEVOTHROID) 150 MCG tablet 90 tablet 0     Sig: Take 1 tablet (150 mcg) by mouth daily       Thyroid Protocol Passed - 6/12/2024  2:01 PM       Last Prescription Date:   1/8/24  Last Fill Qty/Refills:         90, R-0    Last Office Visit:              12/22/23   Future Office visit:                Prescription approved per [facility] Refill Protocol.  Nuvia Arrieta RN on 6/12/2024 at 2:03 PM

## 2024-08-28 DIAGNOSIS — Z98.891 S/P CESAREAN SECTION: ICD-10-CM

## 2024-08-28 RX ORDER — ACETAMINOPHEN AND CODEINE PHOSPHATE 120; 12 MG/5ML; MG/5ML
0.35 SOLUTION ORAL DAILY
Qty: 84 TABLET | Refills: 4 | OUTPATIENT
Start: 2024-08-28

## 2024-08-28 NOTE — TELEPHONE ENCOUNTER
[facility]  sent Rx request for the following:      Requested Prescriptions   Pending Prescriptions Disp Refills    norethindrone (MICRONOR) 0.35 MG tablet [Pharmacy Med Name: NORETHINDRONE 0.35MG TABLETS 28S] 84 tablet 4     Sig: TAKE 1 TABLET BY MOUTH DAILY     Last Prescription Date:   07/10/23  Last Fill Qty/Refills:         84, R-4        This Order Has Been Discontinued    Order Status Reason By On   Discontinued None Lizet Alfaro APRN CNP 12/22/23 0910     Called and spoke to Patient after verifying last name and date of birth. Patient stated that she did not need a refill of the medication.     Hallie Epstein RN on 8/28/2024 at 1:56 PM

## 2024-10-07 DIAGNOSIS — Z32.01 PREGNANCY TEST POSITIVE: ICD-10-CM

## 2024-10-09 RX ORDER — PRENATAL VIT/IRON FUM/FOLIC AC 27MG-0.8MG
1 TABLET ORAL DAILY
Qty: 90 TABLET | Refills: 0 | Status: SHIPPED | OUTPATIENT
Start: 2024-10-09

## 2024-10-09 NOTE — TELEPHONE ENCOUNTER
[facility] sent Rx request for the following:      Requested Prescriptions   Pending Prescriptions Disp Refills    Prenatal Vit-Fe Fumarate-FA (PRENATAL MULTIVITAMIN W/IRON) 27-0.8 MG tablet 90 tablet 3     Sig: Take 1 tablet by mouth daily.       There is no refill protocol information for this order          Last Prescription Date:   11/7/23  Last Fill Qty/Refills:         90, R-3    Last Office Visit:              12/22/23   Future Office visit:                Routing refill request to provider for review/approval because:  Drug not on the [facility] refill protocol     Nuvia Arrieta RN on 10/9/2024 at 3:38 PM

## 2024-12-27 DIAGNOSIS — F41.9 MODERATE ANXIETY: ICD-10-CM

## 2024-12-27 DIAGNOSIS — F33.1 MODERATE EPISODE OF RECURRENT MAJOR DEPRESSIVE DISORDER (H): ICD-10-CM

## 2024-12-31 NOTE — TELEPHONE ENCOUNTER
[facility]  sent Rx request for the following:      Requested Prescriptions   Pending Prescriptions Disp Refills    sertraline (ZOLOFT) 100 MG tablet [Pharmacy Med Name: SERTRALINE 100MG TABLETS] 120 tablet      Sig: TAKE 2 TABLETS(200MG) BY MOUTH DAILY       SSRIs Protocol Failed - 12/31/2024 12:45 PM        Failed - PHQ-9 score less than 5 in past 6 months     Please review last PHQ-9 score.           Failed - JUAN CARLOS-7 score of less than 5 in past 6 months.     Please review last JUAN CARLOS-7 score.           Failed - Recent (12 mo) or future (90 days) visit within the authorizing provider's specialty     The patient must have completed an in-person or virtual visit within the past 12 months or has a future visit scheduled within the next 90 days with the authorizing provider s specialty.  Urgent care and e-visits do not qualify as an office visit for this protocol.       Last Prescription Date:   12/22/23  Last Fill Qty/Refills:         180, R-3    Last Office Visit:              12/22/23 ( Physical )    Future Office visit:                Unable to complete prescription refill per RN Medication Refill Policy.   Nasima Main RN on 12/31/2024 at 12:48 PM

## 2025-01-02 RX ORDER — SERTRALINE HYDROCHLORIDE 100 MG/1
TABLET, FILM COATED ORAL
Qty: 180 TABLET | Refills: 0 | Status: SHIPPED | OUTPATIENT
Start: 2025-01-02

## 2025-01-02 NOTE — TELEPHONE ENCOUNTER
Called and spoke with patient.  Relayed message re: dosing. Patient states she does not take more than 200 mg per day. Usually is taking less.    Wants appt to discuss migraines.  Scheduled fro [facility] on Monday 1/6/25.  Camila Abebe CMA     Medication refill given for 90 tablets.    Zina Whitfield LPN............4/30/2020 2:24 PM

## 2025-01-03 DIAGNOSIS — Z32.01 PREGNANCY TEST POSITIVE: ICD-10-CM

## 2025-01-04 NOTE — TELEPHONE ENCOUNTER
Prenatal Vit-Fe Fumarate-FA (PRENATAL MULTIVITAMIN W/IRON) 27-0.8 MG tablet         Last Written Prescription Date:  10/29/24  Last Fill Quantity: 90,   # refills: 0  Last Office Visit: 12/22/23  Future Office visit:       Routing refill request to provider for review/approval because:  Drug not on the [facility], [facility] or [facility] refill protocol or controlled substance Andreina Ramirez RN on 1/4/2025 at 8:08 AM       Spoke with pt about bx results, pt referred out to [facility] breast clinic, pt states understanding.

## 2025-01-06 RX ORDER — PRENATAL VIT/IRON FUM/FOLIC AC 27MG-0.8MG
1 TABLET ORAL DAILY
Qty: 90 TABLET | Refills: 0 | Status: SHIPPED | OUTPATIENT
Start: 2025-01-06

## 2025-02-01 ENCOUNTER — OFFICE VISIT (OUTPATIENT)
Dept: FAMILY MEDICINE | Facility: OTHER | Age: 45
End: 2025-02-01
Payer: COMMERCIAL

## 2025-02-01 VITALS
RESPIRATION RATE: 20 BRPM | HEART RATE: 88 BPM | TEMPERATURE: 97.7 F | WEIGHT: 218.4 LBS | DIASTOLIC BLOOD PRESSURE: 70 MMHG | SYSTOLIC BLOOD PRESSURE: 112 MMHG | OXYGEN SATURATION: 96 % | BODY MASS INDEX: 36.39 KG/M2 | HEIGHT: 65 IN

## 2025-02-01 DIAGNOSIS — J11.1 INFLUENZA-LIKE ILLNESS: ICD-10-CM

## 2025-02-01 DIAGNOSIS — J06.9 VIRAL URI WITH COUGH: Primary | ICD-10-CM

## 2025-02-01 DIAGNOSIS — J02.9 SORE THROAT: ICD-10-CM

## 2025-02-01 DIAGNOSIS — R12 HEART BURN: ICD-10-CM

## 2025-02-01 DIAGNOSIS — R11.2 NAUSEA AND VOMITING, UNSPECIFIED VOMITING TYPE: ICD-10-CM

## 2025-02-01 LAB
FLUAV RNA SPEC QL NAA+PROBE: NEGATIVE
FLUBV RNA RESP QL NAA+PROBE: NEGATIVE
RSV RNA SPEC NAA+PROBE: NEGATIVE
S PYO DNA THROAT QL NAA+PROBE: NOT DETECTED
SARS-COV-2 RNA RESP QL NAA+PROBE: NEGATIVE

## 2025-02-01 PROCEDURE — 99214 OFFICE O/P EST MOD 30 MIN: CPT

## 2025-02-01 PROCEDURE — G0463 HOSPITAL OUTPT CLINIC VISIT: HCPCS

## 2025-02-01 PROCEDURE — 87651 STREP A DNA AMP PROBE: CPT | Mod: ZL

## 2025-02-01 PROCEDURE — 87637 SARSCOV2&INF A&B&RSV AMP PRB: CPT | Mod: ZL

## 2025-02-01 RX ORDER — OMEPRAZOLE 20 MG/1
20 CAPSULE, DELAYED RELEASE ORAL DAILY
Qty: 14 CAPSULE | Refills: 0 | Status: SHIPPED | OUTPATIENT
Start: 2025-02-01 | End: 2025-02-15

## 2025-02-01 RX ORDER — ONDANSETRON 4 MG/1
4 TABLET, ORALLY DISINTEGRATING ORAL EVERY 6 HOURS PRN
Qty: 30 TABLET | Refills: 0 | Status: SHIPPED | OUTPATIENT
Start: 2025-02-01

## 2025-02-01 ASSESSMENT — ENCOUNTER SYMPTOMS
VOMITING: 1
SORE THROAT: 1
DIARRHEA: 1
SINUS PAIN: 0
NAUSEA: 1
CHILLS: 0
FEVER: 0
COUGH: 1
MUSCULOSKELETAL NEGATIVE: 1
CARDIOVASCULAR NEGATIVE: 1
SINUS PRESSURE: 0

## 2025-02-01 ASSESSMENT — PATIENT HEALTH QUESTIONNAIRE - PHQ9
10. IF YOU CHECKED OFF ANY PROBLEMS, HOW DIFFICULT HAVE THESE PROBLEMS MADE IT FOR YOU TO DO YOUR WORK, TAKE CARE OF THINGS AT HOME, OR GET ALONG WITH OTHER PEOPLE: NOT DIFFICULT AT ALL
SUM OF ALL RESPONSES TO PHQ QUESTIONS 1-9: 0
SUM OF ALL RESPONSES TO PHQ QUESTIONS 1-9: 0

## 2025-02-01 ASSESSMENT — PAIN SCALES - GENERAL: PAINLEVEL_OUTOF10: MILD PAIN (3)

## 2025-02-01 NOTE — NURSING NOTE
Pt here for nausea, diarrhea and HA for a couple weeks.  Cough and sore throat yesterday.  Son was dx with RSV and strep yesterday.  Arelis Doshi CMA ([facility])......................2/1/2025  10:27 AM       Medication Reconciliation: complete    Arelis Doshi CMA  2/1/2025 10:27 AM      FOOD SECURITY SCREENING QUESTIONS:    The next two questions are to help us understand your food security.  If you are feeling you need any assistance in this area, we have resources available to support you today.    Hunger Vital Signs:  Within the past 12 months we worried whether our food would run out before we got money to buy more. Never  Within the past 12 months the food we bought just didn't last and we didn't have money to get more. Never  Arelis Doshi CMA,LPN on 2/1/2025 at 10:27 AM

## 2025-02-01 NOTE — PROGRESS NOTES
Hodan Iniguez  1980    ASSESSMENT/PLAN    1. Viral URI with cough (Primary)  2. Influenza-like illness  - Influenza A/B, RSV and SARS-CoV2 PCR (COVID-19) Nose  3. Nausea and vomiting, unspecified vomiting type  - ondansetron (ZOFRAN ODT) 4 MG ODT tab; Take 1 tablet (4 mg) by mouth every 6 hours as needed for nausea or vomiting.  Dispense: 30 tablet; Refill: 0  4. Sore throat  - Group A Streptococcus PCR Throat Swab  5. Heart burn  - omeprazole (PRILOSEC) 20 MG DR capsule; Take 1 capsule (20 mg) by mouth daily for 14 days.  Dispense: 14 capsule; Refill: 0      -Influenza, COVID, RSV testing negative.  Strep testing negative.  -Omeprazole provided for heartburn.  Recommend take daily for the next 14 days and to follow-up with PCP for further evaluation.  -Zofran provided for nausea and vomiting.  Side effects reviewed.  - Discussed with patient that symptoms and exam are consistent with viral illness.    - No clinical indications for antibiotic treatment at this time.  - Symptomatic treatment - Encouraged fluids, salt water gargles, honey, humidifier, saline nasal spray, lozenges, tea, soup, smoothies, popsicles, topical vapor rub, rest, etc   - May use over-the-counter Tylenol or ibuprofen PRN  - Follow up as needed for new or worsening symptoms          *Explanation of diagnosis, treatment options and risk and benefits of medications reviewed with patient. Patient agrees with plan of care.  *All questions were answered.    *Red flags symptoms were discussed and patient was advised when they should return for reevaluation or for prompt emergency evaluation.   *Patient was given verbal and written instructions on plan of care. Instructions were printed or are available on [employer] on electronic AVS.   *We discussed potential side effects of any prescribed or recommended therapies, as well as expectations for response to treatments.  *Patient discharged in stable condition    Lenny Lemos, ARAMIS, APRN,  "FNP-C  [facility]    SUBJECTIVE  CHIEF COMPLAINT/ REASON FOR VISIT  Patient presents with:  Cough  Pharyngitis  Headache  Diarrhea  Nausea     HISTORY OF PRESENT ILLNESS  Hodan Iniguez is a pleasant 44 year old female presents to [facility] today with cough and sore throat.  Patient reports that over the last week she has been having some vomiting, headache, and diarrhea.  Since yesterday patient has been coughing and had a scratchy throat.  She was exposed to her son who has RSV and strep.  She reports the vomiting has been associated with acid reflux.  She is currently not on any medications.  She reports has been taking Tums every night with minimal effectiveness.  Additionally, she reports she took multiple pregnancy tests which were all negative.    History provided by patient      I have reviewed the nursing notes.  I have reviewed allergies, medication list, problem list, and past medical history.    REVIEW OF SYSTEMS  Review of Systems   Constitutional:  Negative for chills and fever.   HENT:  Positive for sore throat. Negative for congestion, ear discharge, ear pain, sinus pressure and sinus pain.    Respiratory:  Positive for cough.    Cardiovascular: Negative.    Gastrointestinal:  Positive for diarrhea, nausea and vomiting.   Musculoskeletal: Negative.    Skin: Negative.         VITAL SIGNS  Vitals:    02/01/25 1031   BP: 112/70   BP Location: Left arm   Patient Position: Sitting   Cuff Size: Adult Large   Pulse: 88   Resp: 20   Temp: 97.7  F (36.5  C)   TempSrc: Tympanic   SpO2: 96%   Weight: 99.1 kg (218 lb 6.4 oz)   Height: 1.638 m (5' 4.5\")      Body mass index is 36.91 kg/m .      OBJECTIVE  PHYSICAL EXAM  Physical Exam  Vitals and nursing note reviewed.   Constitutional:       General: She is not in acute distress.     Appearance: Normal appearance. She is normal weight. She is not ill-appearing, toxic-appearing or diaphoretic.   HENT:      Head: Normocephalic and atraumatic. "      Right Ear: Tympanic membrane, ear canal and external ear normal. There is no impacted cerumen.      Left Ear: Tympanic membrane, ear canal and external ear normal. There is no impacted cerumen.      Nose: Nose normal. No congestion or rhinorrhea.      Mouth/Throat:      Mouth: Mucous membranes are moist.      Pharynx: Oropharynx is clear. Posterior oropharyngeal erythema present. No oropharyngeal exudate.   Eyes:      Conjunctiva/sclera: Conjunctivae normal.   Cardiovascular:      Rate and Rhythm: Normal rate and regular rhythm.      Pulses: Normal pulses.      Heart sounds: Normal heart sounds.   Pulmonary:      Effort: Pulmonary effort is normal. No respiratory distress.      Breath sounds: Normal breath sounds. No wheezing or rhonchi.   Musculoskeletal:      Cervical back: Normal range of motion. No rigidity or tenderness.   Lymphadenopathy:      Cervical: No cervical adenopathy.   Neurological:      Mental Status: She is alert.            DIAGNOSTICS  Results for orders placed or performed in visit on 02/01/25   Influenza A/B, RSV and SARS-CoV2 PCR (COVID-19) Nose     Status: Normal    Specimen: Nose; Swab   Result Value Ref Range    Influenza A PCR Negative Negative    Influenza B PCR Negative Negative    RSV PCR Negative Negative    SARS CoV2 PCR Negative Negative    Narrative    Testing was performed using the Xpert Xpress CoV2/Flu/RSV Assay on the [employer] Gene[employer]pert Instrument. This test should be ordered for the detection of SARS-CoV2, influenza, and RSV viruses in individuals with signs and symptoms of respiratory tract infection. This test is for in vitro diagnostic use under the US FDA for laboratories certified under CLIA to perform high or moderate complexity testing. This test has been US FDA cleared. A negative result does not rule out the presence of PCR inhibitors in the specimen or target RNA in concentration below the limit of detection for the assay. If only one viral target is positive but  coinfection with multiple targets is suspected, the sample should be re-tested with another FDA cleared, approved, or authorized test, if coninfection would change clinical management. This test was validated by the [facility]. These laboratories are certified under the Clinical Laboratory Improvement Amendments of 1988 (CLIA-88) as qualified to perfom high complexity laboratory testing.   Group A Streptococcus PCR Throat Swab     Status: Normal    Specimen: Throat; Swab   Result Value Ref Range    Group A strep by PCR Not Detected Not Detected    Narrative    The Xpert Xpress Strep A test, performed on the [employer], is a rapid, qualitative in vitro diagnostic test for the detection of Streptococcus pyogenes (Group A ß-hemolytic Streptococcus, Strep A) in throat swab specimens from patients with signs and symptoms of pharyngitis. The Xpert Xpress Strep A test can be used as an aid in the diagnosis of Group A Streptococcal pharyngitis. The assay is not intended to monitor treatment for Group A Streptococcus infections. The Xpert Xpress Strep A test utilizes an automated real-time polymerase chain reaction (PCR) to detect Streptococcus pyogenes DNA.

## 2025-02-05 ENCOUNTER — OFFICE VISIT (OUTPATIENT)
Dept: FAMILY MEDICINE | Facility: OTHER | Age: 45
End: 2025-02-05
Payer: COMMERCIAL

## 2025-02-05 VITALS
RESPIRATION RATE: 21 BRPM | WEIGHT: 221 LBS | HEIGHT: 65 IN | HEART RATE: 113 BPM | OXYGEN SATURATION: 96 % | DIASTOLIC BLOOD PRESSURE: 72 MMHG | TEMPERATURE: 98.3 F | SYSTOLIC BLOOD PRESSURE: 110 MMHG | BODY MASS INDEX: 36.82 KG/M2

## 2025-02-05 DIAGNOSIS — J21.0 RSV BRONCHIOLITIS: Primary | ICD-10-CM

## 2025-02-05 DIAGNOSIS — R05.1 ACUTE COUGH: ICD-10-CM

## 2025-02-05 DIAGNOSIS — J02.9 SORE THROAT: ICD-10-CM

## 2025-02-05 LAB
FLUAV RNA SPEC QL NAA+PROBE: NEGATIVE
FLUBV RNA RESP QL NAA+PROBE: NEGATIVE
RSV RNA SPEC NAA+PROBE: POSITIVE
S PYO DNA THROAT QL NAA+PROBE: NOT DETECTED
SARS-COV-2 RNA RESP QL NAA+PROBE: NEGATIVE

## 2025-02-05 PROCEDURE — 87651 STREP A DNA AMP PROBE: CPT | Mod: ZL

## 2025-02-05 PROCEDURE — 87637 SARSCOV2&INF A&B&RSV AMP PRB: CPT | Mod: ZL

## 2025-02-05 PROCEDURE — G0463 HOSPITAL OUTPT CLINIC VISIT: HCPCS

## 2025-02-05 ASSESSMENT — PAIN SCALES - GENERAL: PAINLEVEL_OUTOF10: NO PAIN (0)

## 2025-02-05 NOTE — PROGRESS NOTES
ASSESSMENT/PLAN:    I have reviewed the nursing notes.  I have reviewed the findings, diagnosis, plan and need for follow up with the patient.    1. RSV bronchiolitis (Primary)  2. Acute cough  3. Sore throat  - Group A Streptococcus PCR Throat Swab  - Influenza A/B, RSV and SARS-CoV2 PCR (COVID-19) Nose    Patient presents with upper respiratory symptoms.  Patient's vitals are stable and she appears nontoxic.  Patient tested positive for RSV.  Discussed that there is no antiviral for RSV. Discussed symptomatic treatment - Encouraged fluids, salt water gargles, honey (only if greater than 1 year in age due to risk of botulism), elevation, humidifier, sinus rinse/netti pot, lozenges, tea, topical vapor rub, popsicles, rest, etc. May use over-the-counter Tylenol or ibuprofen PRN.    Discussed warning signs/symptoms indicative of need to f/u    Follow up if symptoms persist or worsen or concerns    I explained my diagnostic considerations and recommendations to the patient, who voiced understanding and agreement with the treatment plan. All questions were answered. We discussed potential side effects of any prescribed or recommended therapies, as well as expectations for response to treatments.    Michael Crump, JOSE JUAN CNP  2/5/2025  5:34 PM    HPI:    Hodan Iniguez is a 44 year old female  who [facility] for concerns of URI symptoms    URI, x 5 days    Symptoms:  No fevers or chills.   YES: +  sore throat/pharyngitis/tonsillitis.   YES: +  allergy/URI Symptoms  YES: +  muffled sounds/change in hearing  YES: +  sensation of fullness in ear(s)  No ringing in ears/tinnitus  No dizziness  YES: +  congestion (head/nasal/chest)  YES: +  cough/productive cough  YES: +  post nasal drip   YES: +  headache  YES: +  sinus pain/pressure  No myalgias  No otalgia  No rash  Activity Level Changes: No  Appetite/Liquid Intake Changes: Yes: decreased  Changes to Bowel Habits: No  Changes to Bladder Habits:  No  Additional Symptoms to Report: No  Prior workup: Yes: Patient was seen in the rapid clinic on 2025 and her strep and multiplex tests were negative at that time.  She states that her symptoms have worsened.    Treatments tried: OTC Cough med, Fluids, and Rest    Site of exposure: son  Type of exposure: RSV and strep    Other Pertinent History: none    Allergies: NKA    PCP: FREIDA Espinal    Past Medical History:   Diagnosis Date    Anxiety disorder     No Comments Provided    Depressive disorder     Gastro-esophageal reflux disease without esophagitis     No Comments Provided    Hypothyroidism     No Comments Provided    Infertility, female     Never had a full work up, thought she may have PCOS    Migraines     Varicella     as a child     Past Surgical History:   Procedure Laterality Date     SECTION N/A 2022    Procedure:  SECTION;  Surgeon: Paulette Arrieta MD;  Location: [facility] OR    CHOLECYSTECTOMY      No Comments Provided    DILATION AND EVACUATION N/A 5/15/2023    Procedure: Dilation and evacuation under ultrasound guidance;  Surgeon: Rupali De Luna MD;  Location:  OR     Social History     Tobacco Use    Smoking status: Never     Passive exposure: Never    Smokeless tobacco: Never   Substance Use Topics    Alcohol use: Not Currently     Alcohol/week: 24.0 standard drinks of alcohol     Types: 24 Cans of beer per week     Current Outpatient Medications   Medication Sig Dispense Refill    acetaminophen (TYLENOL) 325 MG tablet Take 3 tablets (975 mg) by mouth every 6 hours as needed for mild pain 50 tablet 0    ibuprofen (ADVIL/MOTRIN) 800 MG tablet Take 1 tablet (800 mg) by mouth every 6 hours as needed for other (mild and/or inflammatory pain) 30 tablet 0    levothyroxine (SYNTHROID/LEVOTHROID) 150 MCG tablet TAKE 1 TABLET(150MCG) BY MOUTH DAILY 90 tablet 0    metFORMIN (GLUCOPHAGE XR) 500 MG 24 hr tablet TAKE 1 TABLET(500 MG) BY MOUTH TWICE DAILY WITH MEALS 180  "tablet 0    omeprazole (PRILOSEC) 20 MG DR capsule Take 1 capsule (20 mg) by mouth daily for 14 days. 14 capsule 0    ondansetron (ZOFRAN ODT) 4 MG ODT tab Take 1 tablet (4 mg) by mouth every 6 hours as needed for nausea or vomiting. 30 tablet 0    Prenatal Vit-Fe Fumarate-FA (PRENATAL MULTIVITAMIN W/IRON) 27-0.8 MG tablet Take 1 tablet by mouth daily. 90 tablet 0    sertraline (ZOLOFT) 100 MG tablet TAKE 2 TABLETS(200MG) BY MOUTH DAILY 180 tablet 0     No Known Allergies  Past medical history, past surgical history, current medications and allergies reviewed and accurate to the best of my knowledge.      ROS:  Refer to HPI    /72   Pulse 113   Temp 98.3  F (36.8  C) (Tympanic)   Resp 21   Ht 1.638 m (5' 4.5\")   Wt 100.2 kg (221 lb)   LMP 01/23/2025   SpO2 96%   BMI 37.35 kg/m      EXAM:  General Appearance: Well appearing 44 year old female, appropriate appearance for age. No acute distress   Ears: Left TM intact, translucent with bony landmarks appreciated, no erythema, no effusion, no bulging, no purulence.  Right TM intact, translucent with bony landmarks appreciated, no erythema, no effusion, no bulging, no purulence.  Left auditory canal clear.  Right auditory canal clear.  Normal external ears, non tender.  Eyes: conjunctivae normal without erythema or irritation, corneas clear, no drainage or crusting, no eyelid swelling, pupils equal   Oropharynx: moist mucous membranes, posterior pharynx without erythema, tonsils symmetric and 1+, no erythema, no exudates or petechiae, no post nasal drip seen, no trismus, voice clear.    Nose:  Bilateral nares: no erythema, no edema, moderate congestion noted  Neck: supple with bilateral submandibular adenopathy  Respiratory: normal chest wall and respirations.  Normal effort.  Clear to auscultation bilaterally, no wheezing, crackles or rhonchi.  No increased work of breathing.  Mild cough appreciated.  Cardiac: RRR with no murmurs  Musculoskeletal:  Equal " movement of bilateral upper extremities.  Equal movement of bilateral lower extremities.  Normal gait.    Dermatological: no rashes noted of exposed skin  Neuro: Alert and oriented to person, place, and time.    Psychological: normal affect, alert, oriented, and pleasant.     Labs:  Results for orders placed or performed in visit on 02/05/25   Influenza A/B, RSV and SARS-CoV2 PCR (COVID-19) Nose     Status: Abnormal    Specimen: Nose; Swab   Result Value Ref Range    Influenza A PCR Negative Negative    Influenza B PCR Negative Negative    RSV PCR Positive (A) Negative    SARS CoV2 PCR Negative Negative    Narrative    Testing was performed using the Xpert Xpress CoV2/Flu/RSV Assay on the [employer] Instrument. This test should be ordered for the detection of SARS-CoV2, influenza, and RSV viruses in individuals with signs and symptoms of respiratory tract infection. This test is for in vitro diagnostic use under the US FDA for laboratories certified under CLIA to perform high or moderate complexity testing. This test has been US FDA cleared. A negative result does not rule out the presence of PCR inhibitors in the specimen or target RNA in concentration below the limit of detection for the assay. If only one viral target is positive but coinfection with multiple targets is suspected, the sample should be re-tested with another FDA cleared, approved, or authorized test, if coninfection would change clinical management. This test was validated by the [facility]. These laboratories are certified under the Clinical Laboratory Improvement Amendments of 1988 (CLIA-88) as qualified to perfom high complexity laboratory testing.   Group A Streptococcus PCR Throat Swab     Status: Normal    Specimen: Throat; Swab   Result Value Ref Range    Group A strep by PCR Not Detected Not Detected    Narrative    The Xpert Xpress Strep A test, performed on the [employer] Systems, is a rapid,  qualitative in vitro diagnostic test for the detection of Streptococcus pyogenes (Group A ß-hemolytic Streptococcus, Strep A) in throat swab specimens from patients with signs and symptoms of pharyngitis. The Xpert Xpress Strep A test can be used as an aid in the diagnosis of Group A Streptococcal pharyngitis. The assay is not intended to monitor treatment for Group A Streptococcus infections. The Xpert Xpress Strep A test utilizes an automated real-time polymerase chain reaction (PCR) to detect Streptococcus pyogenes DNA.

## 2025-02-05 NOTE — NURSING NOTE
"Chief Complaint   Patient presents with    Cough    Pharyngitis    Nasal Congestion     Patient here for cough, sore throat, and congestion. Was seen in  on 2/1. Feels she has gotten worse. Son has strep and RSV.     Initial /72   Pulse 113   Temp 98.3  F (36.8  C) (Tympanic)   Resp 21   Ht 1.638 m (5' 4.5\")   Wt 100.2 kg (221 lb)   LMP 01/23/2025   SpO2 96%   BMI 37.35 kg/m   Estimated body mass index is 37.35 kg/m  as calculated from the following:    Height as of this encounter: 1.638 m (5' 4.5\").    Weight as of this encounter: 100.2 kg (221 lb).  Medication Review: complete    The next two questions are to help us understand your food security.  If you are feeling you need any assistance in this area, we have resources available to support you today.          2/5/2025   SDOH- Food Insecurity   Within the past 12 months, did you worry that your food would run out before you got money to buy more? N   Within the past 12 months, did the food you bought just not last and you didn t have money to get more? N         Health Care Directive:  Patient does not have a Health Care Directive: Discussed advance care planning with patient; however, patient declined at this time.    Shruthi Gordon LPN      "

## 2025-02-09 ENCOUNTER — HEALTH MAINTENANCE LETTER (OUTPATIENT)
Age: 45
End: 2025-02-09

## 2025-02-23 DIAGNOSIS — E03.9 HYPOTHYROIDISM AFFECTING PREGNANCY IN FIRST TRIMESTER: ICD-10-CM

## 2025-02-23 DIAGNOSIS — O99.281 HYPOTHYROIDISM AFFECTING PREGNANCY IN FIRST TRIMESTER: ICD-10-CM

## 2025-02-27 RX ORDER — LEVOTHYROXINE SODIUM 150 UG/1
TABLET ORAL
Qty: 90 TABLET | Refills: 0 | Status: SHIPPED | OUTPATIENT
Start: 2025-02-27

## 2025-03-03 DIAGNOSIS — E28.2 PCOS (POLYCYSTIC OVARIAN SYNDROME): ICD-10-CM

## 2025-03-04 RX ORDER — METFORMIN HYDROCHLORIDE 500 MG/1
500 TABLET, EXTENDED RELEASE ORAL 2 TIMES DAILY WITH MEALS
Qty: 180 TABLET | Refills: 0 | Status: SHIPPED | OUTPATIENT
Start: 2025-03-04

## 2025-03-04 NOTE — TELEPHONE ENCOUNTER
[facility] - [place], [place] - [address] sent Rx request for the following:      Requested Prescriptions   Pending Prescriptions Disp Refills    metFORMIN (GLUCOPHAGE XR) 500 MG 24 hr tablet 180 tablet 0     Sig: Take 1 tablet (500 mg) by mouth 2 times daily (with meals).       Biguanide Agents Failed - 3/4/2025 11:37 AM        Failed - Has GFR on file in past 12 months and most recent value is normal        Failed - Recent (12 mo) or future (90 days) visit within the authorizing provider's specialty     The patient must have completed an in-person or virtual visit within the past 12 months or has a future visit scheduled within the next 90 days with the authorizing provider s specialty.  Urgent care and e-visits do not qualify as an office visit for this protocol.       Last Prescription Date:   12/4/24  Last Fill Qty/Refills:         180, R-0    Last Office Visit:              12/22/23   Future Office visit:            None    Unable to complete prescription refill per RN Medication Refill Policy.     Pt due for annual. Routing to provider for refill consideration. Routing to Unit scheduling pool, to assist Pt in scheduling appointment.     Nghia Monroe RN on 3/4/2025 at 11:39 AM

## 2025-03-14 ENCOUNTER — TELEPHONE (OUTPATIENT)
Dept: FAMILY MEDICINE | Facility: OTHER | Age: 45
End: 2025-03-14
Payer: COMMERCIAL

## 2025-03-14 NOTE — TELEPHONE ENCOUNTER
Left message for patient to call back. Would like to change her appointment to annual preventative that she is due for.  Vero Young MA on 3/14/2025 at 2:58 PM  Ext [phone]

## 2025-03-24 DIAGNOSIS — F33.1 MODERATE EPISODE OF RECURRENT MAJOR DEPRESSIVE DISORDER (H): ICD-10-CM

## 2025-03-24 DIAGNOSIS — F41.9 MODERATE ANXIETY: ICD-10-CM

## 2025-03-24 RX ORDER — SERTRALINE HYDROCHLORIDE 100 MG/1
200 TABLET, FILM COATED ORAL DAILY
Qty: 180 TABLET | Refills: 0 | Status: SHIPPED | OUTPATIENT
Start: 2025-03-24

## 2025-03-24 NOTE — TELEPHONE ENCOUNTER
[facility] sent Rx request for the following:      Requested Prescriptions   Pending Prescriptions Disp Refills    sertraline (ZOLOFT) 100 MG tablet 180 tablet 0     Sig: Take 2 tablets (200 mg) by mouth daily.       SSRIs Protocol Failed - 3/24/2025  2:50 PM        Failed - Medication is active on med list and the sig matches. RN to manually verify dose and sig if red X/fail.     If the protocol passes (green check), you do not need to verify med dose and sig.    A prescription matches if they are the same clinical intention.    For Example: once daily and every morning are the same.    The protocol can not identify upper and lower case letters as matching and will fail.     For Example: Take 1 tablet (50 mg) by mouth daily     TAKE 1 TABLET (50 MG) BY MOUTH DAILY    For all fails (red x), verify dose and sig.    If the refill does match what is on file, the RN can still proceed to approve the refill request.       If they do not match, route to the appropriate provider.             Failed - JUAN CARLOS-7 score of less than 5 in past 6 months.     Please review last JUAN CARLOS-7 score.       3/14/2023    11:13 PM 12/15/2023     9:08 AM 12/21/2023     1:01 PM   JUAN CARLOS-7 SCORE   Total Score 7 (mild anxiety) 21 (severe anxiety) 7 (mild anxiety)   Total Score 7 21 7             Failed - Recent (12 mo) or future (90 days) visit within the authorizing provider's specialty     The patient must have completed an in-person or virtual visit within the past 12 months or has a future visit scheduled within the next 90 days with the authorizing provider s specialty.  Urgent care and e-visits do not qualify as an office visit for this protocol.         Last Prescription Date:   1/2/25  Last Fill Qty/Refills:         180, R-0    Last Office Visit:              12/22/23   Future Office visit:            none    Will route to unit schedulers,patient is due for annual wellness visit.    Routing refill request to provider for  review/approval because:  Drug not on the [facility] refill protocol     Hillary Cortez RN on 3/24/2025 at 2:53 PM

## 2025-03-31 DIAGNOSIS — Z32.01 PREGNANCY TEST POSITIVE: ICD-10-CM

## 2025-04-02 RX ORDER — PRENATAL VIT/IRON FUM/FOLIC AC 27MG-0.8MG
1 TABLET ORAL DAILY
Qty: 90 TABLET | Refills: 0 | Status: SHIPPED | OUTPATIENT
Start: 2025-04-02

## 2025-04-02 NOTE — TELEPHONE ENCOUNTER
[facility] sent Rx request for the following:      Requested Prescriptions   Pending Prescriptions Disp Refills    Prenatal Vit-Fe Fumarate-FA (PRENATAL MULTIVITAMIN W/IRON) 27-0.8 MG tablet 90 tablet 0     Sig: Take 1 tablet by mouth daily.       There is no refill protocol information for this order          Last Prescription Date:   1/6/25  Last Fill Qty/Refills:         90, R-0    Last Office Visit:              12/22/23   Future Office visit:            none    Routing refill request to provider for review/approval because:  Drug not on the [facility] refill protocol     Hillary Cortez RN on 4/2/2025 at 11:01 AM

## 2025-04-24 ENCOUNTER — OFFICE VISIT (OUTPATIENT)
Dept: FAMILY MEDICINE | Facility: OTHER | Age: 45
End: 2025-04-24
Attending: PHYSICIAN ASSISTANT
Payer: COMMERCIAL

## 2025-04-24 VITALS
TEMPERATURE: 97.9 F | WEIGHT: 226.6 LBS | HEART RATE: 89 BPM | DIASTOLIC BLOOD PRESSURE: 79 MMHG | SYSTOLIC BLOOD PRESSURE: 132 MMHG | BODY MASS INDEX: 38.3 KG/M2 | OXYGEN SATURATION: 97 %

## 2025-04-24 DIAGNOSIS — R53.83 FATIGUE, UNSPECIFIED TYPE: Primary | ICD-10-CM

## 2025-04-24 DIAGNOSIS — F33.1 MODERATE EPISODE OF RECURRENT MAJOR DEPRESSIVE DISORDER (H): ICD-10-CM

## 2025-04-24 DIAGNOSIS — E28.2 PCOS (POLYCYSTIC OVARIAN SYNDROME): ICD-10-CM

## 2025-04-24 DIAGNOSIS — F41.9 MODERATE ANXIETY: ICD-10-CM

## 2025-04-24 DIAGNOSIS — Z12.31 ENCOUNTER FOR SCREENING MAMMOGRAM FOR MALIGNANT NEOPLASM OF BREAST: ICD-10-CM

## 2025-04-24 DIAGNOSIS — Z12.11 SCREEN FOR COLON CANCER: ICD-10-CM

## 2025-04-24 DIAGNOSIS — R06.83 SNORING: ICD-10-CM

## 2025-04-24 DIAGNOSIS — E03.9 HYPOTHYROIDISM, UNSPECIFIED TYPE: ICD-10-CM

## 2025-04-24 DIAGNOSIS — G47.10 EXCESSIVE SLEEPINESS: ICD-10-CM

## 2025-04-24 DIAGNOSIS — M79.645 PAIN OF FINGER OF LEFT HAND: ICD-10-CM

## 2025-04-24 DIAGNOSIS — G43.109 MIGRAINE WITH AURA AND WITHOUT STATUS MIGRAINOSUS, NOT INTRACTABLE: ICD-10-CM

## 2025-04-24 DIAGNOSIS — E66.01 MORBID OBESITY (H): ICD-10-CM

## 2025-04-24 PROBLEM — F10.20 ALCOHOL DEPENDENCE (H): Status: RESOLVED | Noted: 2022-01-12 | Resolved: 2025-04-24

## 2025-04-24 LAB
ALBUMIN SERPL BCG-MCNC: 4.6 G/DL (ref 3.5–5.2)
ALP SERPL-CCNC: 99 U/L (ref 40–150)
ALT SERPL W P-5'-P-CCNC: 18 U/L (ref 0–50)
ANION GAP SERPL CALCULATED.3IONS-SCNC: 13 MMOL/L (ref 7–15)
AST SERPL W P-5'-P-CCNC: 23 U/L (ref 0–45)
BASOPHILS # BLD AUTO: 0.1 10E3/UL (ref 0–0.2)
BASOPHILS NFR BLD AUTO: 1 %
BILIRUB SERPL-MCNC: 0.2 MG/DL
BUN SERPL-MCNC: 14.8 MG/DL (ref 6–20)
CALCIUM SERPL-MCNC: 10.5 MG/DL (ref 8.8–10.4)
CHLORIDE SERPL-SCNC: 102 MMOL/L (ref 98–107)
CREAT SERPL-MCNC: 0.7 MG/DL (ref 0.51–0.95)
EGFRCR SERPLBLD CKD-EPI 2021: >90 ML/MIN/1.73M2
EOSINOPHIL # BLD AUTO: 0.3 10E3/UL (ref 0–0.7)
EOSINOPHIL NFR BLD AUTO: 5 %
ERYTHROCYTE [DISTWIDTH] IN BLOOD BY AUTOMATED COUNT: 13.3 % (ref 10–15)
EST. AVERAGE GLUCOSE BLD GHB EST-MCNC: 111 MG/DL
GLUCOSE SERPL-MCNC: 94 MG/DL (ref 70–99)
HBA1C MFR BLD: 5.5 %
HCO3 SERPL-SCNC: 23 MMOL/L (ref 22–29)
HCT VFR BLD AUTO: 40.3 % (ref 35–47)
HGB BLD-MCNC: 13.3 G/DL (ref 11.7–15.7)
IMM GRANULOCYTES # BLD: 0 10E3/UL
IMM GRANULOCYTES NFR BLD: 0 %
LYMPHOCYTES # BLD AUTO: 2.5 10E3/UL (ref 0.8–5.3)
LYMPHOCYTES NFR BLD AUTO: 34 %
MCH RBC QN AUTO: 30.1 PG (ref 26.5–33)
MCHC RBC AUTO-ENTMCNC: 33 G/DL (ref 31.5–36.5)
MCV RBC AUTO: 91 FL (ref 78–100)
MONOCYTES # BLD AUTO: 0.6 10E3/UL (ref 0–1.3)
MONOCYTES NFR BLD AUTO: 9 %
NEUTROPHILS # BLD AUTO: 3.8 10E3/UL (ref 1.6–8.3)
NEUTROPHILS NFR BLD AUTO: 52 %
NRBC # BLD AUTO: 0 10E3/UL
NRBC BLD AUTO-RTO: 0 /100
PLATELET # BLD AUTO: 345 10E3/UL (ref 150–450)
POTASSIUM SERPL-SCNC: 4.6 MMOL/L (ref 3.4–5.3)
PROT SERPL-MCNC: 7.9 G/DL (ref 6.4–8.3)
RBC # BLD AUTO: 4.42 10E6/UL (ref 3.8–5.2)
SODIUM SERPL-SCNC: 138 MMOL/L (ref 135–145)
TSH SERPL DL<=0.005 MIU/L-ACNC: 10.82 UIU/ML (ref 0.3–4.2)
WBC # BLD AUTO: 7.3 10E3/UL (ref 4–11)

## 2025-04-24 PROCEDURE — 83036 HEMOGLOBIN GLYCOSYLATED A1C: CPT | Mod: ZL | Performed by: PHYSICIAN ASSISTANT

## 2025-04-24 PROCEDURE — 84443 ASSAY THYROID STIM HORMONE: CPT | Mod: ZL | Performed by: PHYSICIAN ASSISTANT

## 2025-04-24 PROCEDURE — 82040 ASSAY OF SERUM ALBUMIN: CPT | Mod: ZL | Performed by: PHYSICIAN ASSISTANT

## 2025-04-24 PROCEDURE — G0463 HOSPITAL OUTPT CLINIC VISIT: HCPCS

## 2025-04-24 PROCEDURE — 85004 AUTOMATED DIFF WBC COUNT: CPT | Mod: ZL | Performed by: PHYSICIAN ASSISTANT

## 2025-04-24 PROCEDURE — 36415 COLL VENOUS BLD VENIPUNCTURE: CPT | Mod: ZL | Performed by: PHYSICIAN ASSISTANT

## 2025-04-24 RX ORDER — LEVOTHYROXINE SODIUM 150 UG/1
150 TABLET ORAL
Qty: 90 TABLET | Refills: 4 | Status: SHIPPED | OUTPATIENT
Start: 2025-04-24

## 2025-04-24 RX ORDER — SUMATRIPTAN 50 MG/1
50 TABLET, FILM COATED ORAL
Qty: 12 TABLET | Refills: 11 | Status: SHIPPED | OUTPATIENT
Start: 2025-04-24

## 2025-04-24 RX ORDER — AMITRIPTYLINE HYDROCHLORIDE 10 MG/1
10 TABLET ORAL AT BEDTIME
Qty: 90 TABLET | Refills: 3 | Status: SHIPPED | OUTPATIENT
Start: 2025-04-24

## 2025-04-24 RX ORDER — SERTRALINE HYDROCHLORIDE 100 MG/1
200 TABLET, FILM COATED ORAL DAILY
Qty: 180 TABLET | Refills: 4 | Status: SHIPPED | OUTPATIENT
Start: 2025-04-24

## 2025-04-24 RX ORDER — METFORMIN HYDROCHLORIDE 500 MG/1
500 TABLET, EXTENDED RELEASE ORAL 2 TIMES DAILY WITH MEALS
Qty: 180 TABLET | Refills: 4 | Status: SHIPPED | OUTPATIENT
Start: 2025-04-24

## 2025-04-24 RX ORDER — PHENTERMINE HYDROCHLORIDE 30 MG/1
30 CAPSULE ORAL EVERY MORNING
Qty: 30 CAPSULE | Refills: 2 | Status: SHIPPED | OUTPATIENT
Start: 2025-04-24

## 2025-04-24 ASSESSMENT — ENCOUNTER SYMPTOMS: HEADACHES: 1

## 2025-04-24 NOTE — PATIENT INSTRUCTIONS
Cologuard Patient Instructions    You received an order for a Cologuard test. You will receive your kit in the mail. Please follow the instructions that are provided in the kit.      Reminder: Ship Cologuard test the same day or the next day to allow enough delivery time. The lab must receive your specimen within 4 days for successful testing. If not delivered in time, you may have to complete the process again.    Home Pick-up:  Once you complete the kit, call [facility] at [phone] and they will schedule a UPS pickup for you.    OR    Drop Off Locations:  Once you have completed the collection and have it ready to be shipped, bring it to one of the following sites listed below. *Note: none of the sites ship out later than mid-morning. Please do not drop off Fridays, as they will not go out until Monday which will make your specimen inacceptable.     - [facility] ([address], [place] [address])      Drop off: Monday-Thursday, 8:00am-4:30pm at the [address] check-in desk      - [facility] ([address], [place] [address])   Drop off: Monday-Thursday, 8:00am-5:45pm

## 2025-04-24 NOTE — NURSING NOTE
"Chief Complaint   Patient presents with    Medication Follow-up    Headache     Patient would like to have some labs done. She would also like to restart migraine medication. She was on sumatriptan before pregnancy.  Initial /79   Pulse 89   Temp 97.9  F (36.6  C) (Tympanic)   Wt 102.8 kg (226 lb 9.6 oz)   LMP 04/15/2025 (Approximate)   SpO2 97%   BMI 38.30 kg/m   Estimated body mass index is 38.3 kg/m  as calculated from the following:    Height as of 2/5/25: 1.638 m (5' 4.5\").    Weight as of this encounter: 102.8 kg (226 lb 9.6 oz).  Medication Review: complete    The next two questions are to help us understand your food security.  If you are feeling you need any assistance in this area, we have resources available to support you today.          2/5/2025   SDOH- Food Insecurity   Within the past 12 months, did you worry that your food would run out before you got money to buy more? N   Within the past 12 months, did the food you bought just not last and you didn t have money to get more? N         Health Care Directive:  Patient does not have a Health Care Directive: Discussed advance care planning with patient; however, patient declined at this time.    Vero Young MA      "

## 2025-04-24 NOTE — PROGRESS NOTES
Assessment & Plan   Problem List Items Addressed This Visit          Digestive    Morbid obesity (H)    Relevant Medications    metFORMIN (GLUCOPHAGE XR) 500 MG 24 hr tablet    phentermine 30 MG capsule    Other Relevant Orders    TSH    Vitamin D Total    CBC and Differential    Hemoglobin A1c    Comprehensive Metabolic Panel       Endocrine    Hypothyroidism    Relevant Medications    levothyroxine (SYNTHROID/LEVOTHROID) 150 MCG tablet    Other Relevant Orders    TSH    PCOS (polycystic ovarian syndrome)    Relevant Medications    metFORMIN (GLUCOPHAGE XR) 500 MG 24 hr tablet       Behavioral    Major depression, recurrent    Relevant Medications    sertraline (ZOLOFT) 100 MG tablet    amitriptyline (ELAVIL) 10 MG tablet       Other    Moderate anxiety    Relevant Medications    sertraline (ZOLOFT) 100 MG tablet    amitriptyline (ELAVIL) 10 MG tablet     Other Visit Diagnoses       Fatigue, unspecified type    -  Primary    Relevant Orders    TSH    Vitamin D Total    CBC and Differential    Hemoglobin A1c    Comprehensive Metabolic Panel    Adult Sleep Eval & Management Referral    Snoring        Relevant Orders    Adult Sleep Eval & Management Referral    Excessive sleepiness        Relevant Orders    Adult Sleep Eval & Management Referral    Migraine with aura and without status migrainosus, not intractable        Relevant Medications    sertraline (ZOLOFT) 100 MG tablet    SUMAtriptan (IMITREX) 50 MG tablet    amitriptyline (ELAVIL) 10 MG tablet    Encounter for screening mammogram for malignant neoplasm of breast        Relevant Orders    MA Screening Bilateral w/ Freddy    Screen for colon cancer        Relevant Orders    COLOGJOANIE([employer])    Pain of finger of left hand        Relevant Orders    CRW BLACK WRIST SPLINT (Completed)           Pain in the left hand and finger: Patient was given a wrist brace for possible carpal tunnel syndrome.  Offered EMG however patient declined at this time.  Would   Vini Rodgers.     "like to closely monitor symptoms.  Encouraged ice, heat, ibuprofen as needed.  Encouraged to use the wrist brace with repetitive activities and while sleeping.    Order Cologuard for colon cancer screening.  Ordered mammogram for breast cancer screening.    Migraines: Patient was given a refill of the Imitrex to use as needed for severe migraine.  Also started on amitriptyline 10 mg at bedtime for migraine prevention.    Excessive sleepiness, snoring, fatigue: Ordered sleep study to rule out concerns.    PCOS: Refilled metformin.  Stable.    Hypothyroidism: Completed lab work for monitoring.  Refilled levothyroxine.  No medication dose changes pending lab results.    Morbid obesity: Completed thorough lab workup in order to rule out concerns.  Continue metformin.  Patient was prescribed phentermine 30 mg capsules quantity 30 with 2 refills to assist with losing weight.  Losing weight will help improve her thyroid level and decrease depression and anxiety.  PDMP Review         Value Time User    State PDMP site checked  Yes 4/24/2025  1:54 PM Toña Beckham PA-C            The longitudinal plan of care for the diagnosis(es)/condition(s) as documented were addressed during this visit. Due to the added complexity in care, I will continue to support Hodan in the subsequent management and with ongoing continuity of care.    Moderate episode of recurrent major depressive disorder and moderate anxiety: Refilled medication.  Stable.  No medication changes are warranted at this time.    Recheck in 3 months for monitoring.       BMI  Estimated body mass index is 38.3 kg/m  as calculated from the following:    Height as of 2/5/25: 1.638 m (5' 4.5\").    Weight as of this encounter: 102.8 kg (226 lb 9.6 oz).   Weight management plan: Discussed healthy diet and exercise guidelines        Subjective   Hodan is a 44 year old, presenting for the following health issues:  Medication Follow-up and Headache        4/24/2025     " "1:10 PM   Additional Questions   Roomed by Vero ROBERTS CMA     History of Present Illness       Hypothyroidism:     Since last visit, patient describes the following symptoms::  Anxiety, Depression, Fatigue and Weight gain    Weight gain::  11-15 lbs.    Headaches:   Since the patient's last clinic visit, headaches are: worsened  The patient is getting headaches:  4 days a week on average  She is able to do normal daily activities when she has a migraine.  The patient is taking the following rescue/relief medications:  Tylenol   Patient states \"I get only a small amount of relief\" from the rescue/relief medications.   The patient is taking the following medications to prevent migraines:  No medications to prevent migraines  In the past 4 weeks, the patient has gone to an Urgent Care or Emergency Room 0 times times due to headaches.    She eats 2-3 servings of fruits and vegetables daily.She consumes 2 sweetened beverage(s) daily.She exercises with enough effort to increase her heart rate 30 to 60 minutes per day.  She exercises with enough effort to increase her heart rate 5 days per week.   She is taking medications regularly.      Patient is coming today for several concerns.  Having migraines more frequently.  Gets headaches daily.  Has severe migraines a few times per week.  Up-and-down severity.  Had a call to work yesterday due to bad migraine.  Using Tylenol as needed.  History of using Imitrex.  Wondering about restarting.    Has had more frequent numbness and burning in her hands.  Alternates in the hands.  More on the left currently.  Wakes her up from sleeping.  Hard time feeling her hands to do anything.  Burning in the thumb and arm.  Has been present for years however it is getting worse over the last few months.    Feels tired frequently.  Tired in the morning even after sleeping all night.  Snores loudly.  Father has history of sleep apnea.  Excessively tired in the daytime.    Patient needs her thyroid " checked for monitoring.  Currently taking her medication.    Patient has been trying to lose weight.  Currently taking metformin.  Unfortunate it is not helping to lose weight.  Wondering what her treatment options are.    Moderate anxiety depression.  Currently stable with the sertraline.  No acute concerns at this time.    Due for breast cancer and colon cancer screening.            Review of Systems  Constitutional, neuro, ENT, endocrine, pulmonary, cardiac, gastrointestinal, genitourinary, musculoskeletal, integument and psychiatric systems are negative, except as otherwise noted.      Objective    /79   Pulse 89   Temp 97.9  F (36.6  C) (Tympanic)   Wt 102.8 kg (226 lb 9.6 oz)   LMP 04/15/2025 (Approximate)   SpO2 97%   BMI 38.30 kg/m    Body mass index is 38.3 kg/m .  Physical Exam  Vitals and nursing note reviewed.   Constitutional:       Appearance: Normal appearance.   HENT:      Head: Normocephalic and atraumatic.   Musculoskeletal:         General: No swelling, tenderness or signs of injury. Normal range of motion.      Cervical back: Normal range of motion.      Comments: No arm or extremity pain with palpation.  Negative Tinel's sign.  No pain with bilateral wrist flexion or extension.   Skin:     General: Skin is warm and dry.   Neurological:      General: No focal deficit present.      Mental Status: She is alert and oriented to person, place, and time.      Motor: No weakness.      Coordination: Coordination normal.      Gait: Gait normal.      Deep Tendon Reflexes: Reflexes normal.   Psychiatric:         Mood and Affect: Mood normal.         Behavior: Behavior normal.            No results found for any visits on 04/24/25.        Signed Electronically by: Toña Beckham PA-C

## 2025-05-03 ENCOUNTER — MYC MEDICAL ADVICE (OUTPATIENT)
Dept: FAMILY MEDICINE | Facility: OTHER | Age: 45
End: 2025-05-03
Payer: COMMERCIAL

## 2025-05-03 DIAGNOSIS — E66.01 MORBID OBESITY (H): ICD-10-CM

## 2025-05-03 DIAGNOSIS — G43.109 MIGRAINE WITH AURA AND WITHOUT STATUS MIGRAINOSUS, NOT INTRACTABLE: ICD-10-CM

## 2025-05-05 RX ORDER — PHENTERMINE HYDROCHLORIDE 15 MG/1
15 CAPSULE ORAL EVERY MORNING
Qty: 30 CAPSULE | Refills: 2 | Status: SHIPPED | OUTPATIENT
Start: 2025-05-05

## 2025-05-05 NOTE — TELEPHONE ENCOUNTER
Has been having nausea from taking all her meds.     Per OV from 4/24:      Recommend decreasing phentermine to 15 mg dose x1 month then increase. Chavo'd up order for 15 mg dose. Also recommend taking 1/2 (5 mg) of Elavil x1-2 weeks then increasing to 10 mg.     Routing to provider to review and respond.  Flores Bermudez RN on 5/5/2025 at 9:11 AM

## 2025-05-12 ENCOUNTER — MYC MEDICAL ADVICE (OUTPATIENT)
Dept: FAMILY MEDICINE | Facility: OTHER | Age: 45
End: 2025-05-12
Payer: COMMERCIAL

## 2025-05-12 DIAGNOSIS — R11.2 NAUSEA AND VOMITING, UNSPECIFIED VOMITING TYPE: ICD-10-CM

## 2025-05-12 RX ORDER — ONDANSETRON 4 MG/1
4 TABLET, ORALLY DISINTEGRATING ORAL EVERY 6 HOURS PRN
Qty: 30 TABLET | Refills: 0 | Status: SHIPPED | OUTPATIENT
Start: 2025-05-12

## 2025-05-12 NOTE — TELEPHONE ENCOUNTER
5/11 stopped both Amitriptyline and Phentermine and got a migraine same day.   Imitrex taken for migraine yesterday.     5/12 she took the Amitriptyline this morning and has been throwing up.     My Thoughts:  It's hard to tell if the vomiting is from the migraine or the Amitriptyline- but we would recommend stopping the Amitriptyline and not restarting at this time.     Anything else that can be done to break migraine cycle?      ______________________________________________________________________________________    4/24/25  LOV with Laure Beckham.         Jayna Pruitt RN on 5/12/2025 at 10:52 AM

## 2025-05-24 ENCOUNTER — MYC MEDICAL ADVICE (OUTPATIENT)
Dept: FAMILY MEDICINE | Facility: OTHER | Age: 45
End: 2025-05-24
Payer: COMMERCIAL

## 2025-05-24 DIAGNOSIS — G43.109 MIGRAINE WITH AURA AND WITHOUT STATUS MIGRAINOSUS, NOT INTRACTABLE: ICD-10-CM

## 2025-05-24 DIAGNOSIS — F41.9 MODERATE ANXIETY: ICD-10-CM

## 2025-05-24 DIAGNOSIS — E66.01 MORBID OBESITY (H): ICD-10-CM

## 2025-05-24 DIAGNOSIS — F33.1 MODERATE EPISODE OF RECURRENT MAJOR DEPRESSIVE DISORDER (H): ICD-10-CM

## 2025-05-24 DIAGNOSIS — E03.9 HYPOTHYROIDISM AFFECTING PREGNANCY IN THIRD TRIMESTER: ICD-10-CM

## 2025-05-24 DIAGNOSIS — O99.283 HYPOTHYROIDISM AFFECTING PREGNANCY IN THIRD TRIMESTER: ICD-10-CM

## 2025-05-24 DIAGNOSIS — E28.2 PCOS (POLYCYSTIC OVARIAN SYNDROME): ICD-10-CM

## 2025-05-24 DIAGNOSIS — Z32.01 PREGNANCY TEST POSITIVE: ICD-10-CM

## 2025-05-27 RX ORDER — PHENTERMINE HYDROCHLORIDE 15 MG/1
15 CAPSULE ORAL EVERY MORNING
Qty: 30 CAPSULE | Refills: 2 | Status: SHIPPED | OUTPATIENT
Start: 2025-05-27

## 2025-05-27 RX ORDER — SERTRALINE HYDROCHLORIDE 100 MG/1
200 TABLET, FILM COATED ORAL DAILY
Qty: 180 TABLET | Refills: 4 | Status: SHIPPED | OUTPATIENT
Start: 2025-05-27

## 2025-05-27 RX ORDER — PRENATAL VIT/IRON FUM/FOLIC AC 27MG-0.8MG
1 TABLET ORAL DAILY
Qty: 90 TABLET | Refills: 0 | Status: SHIPPED | OUTPATIENT
Start: 2025-05-27

## 2025-05-27 RX ORDER — LEVOTHYROXINE SODIUM 175 UG/1
175 TABLET ORAL
Qty: 90 TABLET | Refills: 0 | Status: SHIPPED | OUTPATIENT
Start: 2025-05-27

## 2025-05-27 RX ORDER — METFORMIN HYDROCHLORIDE 500 MG/1
500 TABLET, EXTENDED RELEASE ORAL 2 TIMES DAILY WITH MEALS
Qty: 180 TABLET | Refills: 4 | Status: SHIPPED | OUTPATIENT
Start: 2025-05-27

## 2025-05-27 RX ORDER — AMITRIPTYLINE HYDROCHLORIDE 10 MG/1
10 TABLET ORAL AT BEDTIME
Qty: 90 TABLET | Refills: 3 | Status: SHIPPED | OUTPATIENT
Start: 2025-05-27

## 2025-05-27 RX ORDER — SUMATRIPTAN 50 MG/1
50 TABLET, FILM COATED ORAL
Qty: 12 TABLET | Refills: 11 | Status: SHIPPED | OUTPATIENT
Start: 2025-05-27

## 2025-05-27 NOTE — TELEPHONE ENCOUNTER
Pt would like all Rx transferred to [facility].     Phentermine would need a new signature so I zoya'd them all up for you to sign. Adjusted the refills on the phentermine to 1.     Routing to provider to review and respond.  Flores Bermudez RN on 5/27/2025 at 8:15 AM

## 2025-05-27 NOTE — TELEPHONE ENCOUNTER
Refilled Rx.   Toña Beckham PA-C ..................5/27/2025 1:12 PM       PDMP Review         Value Time User    State PDMP site checked  Yes 5/27/2025  1:12 PM Toña Beckham PA-C

## 2025-07-22 ENCOUNTER — OFFICE VISIT (OUTPATIENT)
Dept: FAMILY MEDICINE | Facility: OTHER | Age: 45
End: 2025-07-22
Attending: NURSE PRACTITIONER
Payer: COMMERCIAL

## 2025-07-22 VITALS
BODY MASS INDEX: 36.99 KG/M2 | OXYGEN SATURATION: 96 % | WEIGHT: 222 LBS | SYSTOLIC BLOOD PRESSURE: 120 MMHG | HEART RATE: 92 BPM | RESPIRATION RATE: 18 BRPM | DIASTOLIC BLOOD PRESSURE: 84 MMHG | TEMPERATURE: 97.7 F | HEIGHT: 65 IN

## 2025-07-22 DIAGNOSIS — M25.561 ACUTE PAIN OF RIGHT KNEE: ICD-10-CM

## 2025-07-22 DIAGNOSIS — K12.2 UVULITIS: Primary | ICD-10-CM

## 2025-07-22 LAB — S PYO DNA THROAT QL NAA+PROBE: NOT DETECTED

## 2025-07-22 PROCEDURE — 87651 STREP A DNA AMP PROBE: CPT | Mod: ZL | Performed by: STUDENT IN AN ORGANIZED HEALTH CARE EDUCATION/TRAINING PROGRAM

## 2025-07-22 PROCEDURE — 250N000009 HC RX 250: Performed by: STUDENT IN AN ORGANIZED HEALTH CARE EDUCATION/TRAINING PROGRAM

## 2025-07-22 RX ORDER — DEXAMETHASONE SODIUM PHOSPHATE 4 MG/ML
10 VIAL (ML) INJECTION ONCE
Status: COMPLETED | OUTPATIENT
Start: 2025-07-22 | End: 2025-07-22

## 2025-07-22 RX ADMIN — DEXAMETHASONE SODIUM PHOSPHATE 10 MG: 4 INJECTION, SOLUTION INTRAMUSCULAR; INTRAVENOUS at 10:39

## 2025-07-22 ASSESSMENT — PAIN SCALES - GENERAL: PAINLEVEL_OUTOF10: MILD PAIN (3)

## 2025-07-22 NOTE — PROGRESS NOTES
"  Assessment & Plan     (K12.2) Uvulitis  (primary encounter diagnosis)    Comment: Uvulitis.  Most likely from irritation from sleeping with open mouth.  She has talked to her PCP regarding sleep study, but has not scheduled at this time.  Strep test was negative today.  No difficulty breathing or swallowing at this time.    Plan: dexAMETHasone (DECADRON) injectable solution         used ORALLY 10 mg, Group A Streptococcus PCR         Throat Swab            One-time dose of dexamethasone given in the office today.  Continue over-the-counter management at this time.  Follow-up if not improving.  Return to rapid clinic or ER if worsening or changing.  She is comfortable and agreeable with this plan.      (M25.561) Acute pain of right knee  Comment: Persistent pain of the right knee x 4 weeks.  Comes and goes.  At this time, no specific injury, deferring x-ray imaging.  Plan: Orthopedic  Referral          Plan to continue rest, ice, elevation.  Follow-up with orthopedics if not improving.  Return to rapid clinic or ER if symptoms worsen or change.  She is comfortable and agreeable with this plan.      Jarod Pettit is a 44 year old, presenting for the following health issues:  Throat Problem    HPI     Patient presents today with concerns of sore throat.  She notes she does snore when she sleeps, has her mouth open.  She notes today when she woke up there was a covering of mucus in her throat and then when she cleared it there was pain in the uvula as well as swelling.  She notes that she notices the uvula as she does have a strong gag reflex.  No fevers.  No exposures to strep throat.  No difficulty swallowing or breathing.      Review of Systems  Constitutional, HEENT, cardiovascular, pulmonary, gi and gu systems are negative, except as otherwise noted.        Objective    /84   Pulse 92   Temp 97.7  F (36.5  C)   Resp 18   Ht 1.638 m (5' 4.5\")   Wt 100.7 kg (222 lb)   SpO2 96%   BMI " 37.52 kg/m    Body mass index is 37.52 kg/m .    Physical Exam   GENERAL: alert and no distress  EYES: Eyes grossly normal to inspection, PERRL and conjunctivae and sclerae normal  HENT: ear canals and TM's normal, nose clear, uvula with slight edema, moderate erythema, tonsils 1+, pharynx symmetric  NECK: no adenopathy, no asymmetry, masses, or scars  RESP: lungs clear to auscultation - no rales, rhonchi or wheezes  CV: regular rate and rhythm, normal S1 S2, no S3 or S4, no murmur, click or rub, no peripheral edema  MS: no gross musculoskeletal defects noted, no edema, erythema, ecchymosis noted of the right knee.  Decreased range of motion with both flexion and extension due to pain.  Slightly tender over the lateral/lateral posterior aspect of the knee.  Distal pulses intact.    Results for orders placed or performed in visit on 07/22/25   Group A Streptococcus PCR Throat Swab     Status: Normal    Specimen: Throat; Swab   Result Value Ref Range    Group A strep by PCR Not Detected Not Detected    Narrative    The Xpert Xpress Strep A test, performed on the [employer], is a rapid, qualitative in vitro diagnostic test for the detection of Streptococcus pyogenes (Group A ß-hemolytic Streptococcus, Strep A) in throat swab specimens from patients with signs and symptoms of pharyngitis. The Xpert Xpress Strep A test can be used as an aid in the diagnosis of Group A Streptococcal pharyngitis. The assay is not intended to monitor treatment for Group A Streptococcus infections. The Xpert Xpress Strep A test utilizes an automated real-time polymerase chain reaction (PCR) to detect Streptococcus pyogenes DNA.         Signed Electronically by: Toña Catalan PA-C

## 2025-07-22 NOTE — NURSING NOTE
Patient presents today for sore throat that started last night. Patient reports that this morning it felt like she had something stuck in her throat.    Medication Reconciliation Complete    Nona Wilson LPN  7/22/2025 10:04 AM

## 2025-08-05 ENCOUNTER — MYC MEDICAL ADVICE (OUTPATIENT)
Dept: FAMILY MEDICINE | Facility: OTHER | Age: 45
End: 2025-08-05
Payer: COMMERCIAL

## 2025-08-05 DIAGNOSIS — E28.2 PCOS (POLYCYSTIC OVARIAN SYNDROME): ICD-10-CM

## 2025-08-05 RX ORDER — METFORMIN HYDROCHLORIDE 500 MG/1
500 TABLET, EXTENDED RELEASE ORAL 2 TIMES DAILY WITH MEALS
Qty: 180 TABLET | Refills: 4 | Status: SHIPPED | OUTPATIENT
Start: 2025-05-27

## (undated) DEVICE — PAD CHUX UNDERPAD 30X36" P3036C

## (undated) DEVICE — ESU GROUND PAD ADULT W/CORD E7507

## (undated) DEVICE — LINEN TOWEL PACK X5 5464

## (undated) DEVICE — Device

## (undated) DEVICE — GLOVE PROTEXIS BLUE W/NEU-THERA 6.5  2D73EB65

## (undated) DEVICE — PREP DYNA-HEX 4% CHG SCRUB 4OZ BOTTLE MDS098710

## (undated) DEVICE — GLOVE BIOGEL PI MICRO INDICATOR UNDERGLOVE SZ 7.0 48970

## (undated) DEVICE — SOL WATER 1500ML

## (undated) DEVICE — SU MONOCRYL 0 CT-1 36" UND Y946H

## (undated) DEVICE — SUCTION CANNULA UTERINE 10MM CVD 022110-10

## (undated) DEVICE — DRSG STERI STRIP 1/2X4" R1547

## (undated) DEVICE — SUCTION CANNULA UTERINE 14MM CVD 1/2" BASE 022114

## (undated) DEVICE — COVER LIGHT HANDLE LT-F02

## (undated) DEVICE — SU VICRYL 0 CT-1 36" J346H

## (undated) DEVICE — SOL NACL 0.9% IRRIG 1000ML BOTTLE 2F7124

## (undated) DEVICE — GLOVE BIOGEL PI MICRO SZ 6.5 48565

## (undated) DEVICE — PREP CHLORAPREP 26ML TINTED ORANGE  260815

## (undated) DEVICE — DRSG MEDIPORE 3 1/2X8" 3570

## (undated) DEVICE — GLOVE PROTEXIS POWDER FREE SMT 6.5  2D72PT65X

## (undated) DEVICE — SLEEVE COMPRESSION SCD KNEE MED 74022

## (undated) DEVICE — SU VICRYL 4-0 KS 27" UND J662H

## (undated) DEVICE — TUBING SUCTION VACUUM COLLECTION 6FTX3/8" 022310

## (undated) DEVICE — SOL WATER IRRIG 1000ML BOTTLE 2F7114

## (undated) DEVICE — ESU PENCIL SMOKE EVAC W/ROCKER SWITCH 0703-047-000

## (undated) DEVICE — PAD ABD 5X9" STERILE 9190A

## (undated) DEVICE — LINEN GOWN X4 5410

## (undated) DEVICE — SU PDS II 0 CTX 60" Z990G

## (undated) DEVICE — STRAP KNEE/BODY 31143004

## (undated) DEVICE — SUTURE PLAIN GUT 3-0 CT-1 842H

## (undated) DEVICE — PACK C SECTION SMA15CSFCA

## (undated) RX ORDER — HYDROMORPHONE HYDROCHLORIDE 1 MG/ML
INJECTION, SOLUTION INTRAMUSCULAR; INTRAVENOUS; SUBCUTANEOUS
Status: DISPENSED
Start: 2023-05-15

## (undated) RX ORDER — IBUPROFEN 400 MG/1
TABLET, FILM COATED ORAL
Status: DISPENSED
Start: 2023-05-15

## (undated) RX ORDER — MORPHINE SULFATE 0.5 MG/ML
INJECTION, SOLUTION EPIDURAL; INTRATHECAL; INTRAVENOUS
Status: DISPENSED
Start: 2022-05-19

## (undated) RX ORDER — DEXAMETHASONE SODIUM PHOSPHATE 4 MG/ML
INJECTION, SOLUTION INTRA-ARTICULAR; INTRALESIONAL; INTRAMUSCULAR; INTRAVENOUS; SOFT TISSUE
Status: DISPENSED
Start: 2025-07-22

## (undated) RX ORDER — FENTANYL CITRATE 50 UG/ML
INJECTION, SOLUTION INTRAMUSCULAR; INTRAVENOUS
Status: DISPENSED
Start: 2023-05-15

## (undated) RX ORDER — FENTANYL CITRATE-0.9 % NACL/PF 10 MCG/ML
PLASTIC BAG, INJECTION (ML) INTRAVENOUS
Status: DISPENSED
Start: 2022-05-19

## (undated) RX ORDER — DEXAMETHASONE SODIUM PHOSPHATE 4 MG/ML
INJECTION, SOLUTION INTRA-ARTICULAR; INTRALESIONAL; INTRAMUSCULAR; INTRAVENOUS; SOFT TISSUE
Status: DISPENSED
Start: 2022-05-19

## (undated) RX ORDER — MISOPROSTOL 200 UG/1
TABLET ORAL
Status: DISPENSED
Start: 2023-05-15

## (undated) RX ORDER — LIDOCAINE HYDROCHLORIDE AND EPINEPHRINE 10; 10 MG/ML; UG/ML
INJECTION, SOLUTION INFILTRATION; PERINEURAL
Status: DISPENSED
Start: 2023-05-15

## (undated) RX ORDER — ONDANSETRON 2 MG/ML
INJECTION INTRAMUSCULAR; INTRAVENOUS
Status: DISPENSED
Start: 2023-05-15

## (undated) RX ORDER — FENTANYL CITRATE 50 UG/ML
INJECTION, SOLUTION INTRAMUSCULAR; INTRAVENOUS
Status: DISPENSED
Start: 2022-05-19

## (undated) RX ORDER — ACETAMINOPHEN 325 MG/1
TABLET ORAL
Status: DISPENSED
Start: 2023-05-15

## (undated) RX ORDER — KETOROLAC TROMETHAMINE 30 MG/ML
INJECTION, SOLUTION INTRAMUSCULAR; INTRAVENOUS
Status: DISPENSED
Start: 2022-05-19

## (undated) RX ORDER — SODIUM CHLORIDE, SODIUM LACTATE, POTASSIUM CHLORIDE, CALCIUM CHLORIDE 600; 310; 30; 20 MG/100ML; MG/100ML; MG/100ML; MG/100ML
INJECTION, SOLUTION INTRAVENOUS
Status: DISPENSED
Start: 2022-05-19

## (undated) RX ORDER — AZITHROMYCIN 500 MG/5ML
INJECTION, POWDER, LYOPHILIZED, FOR SOLUTION INTRAVENOUS
Status: DISPENSED
Start: 2022-05-19

## (undated) RX ORDER — ONDANSETRON 2 MG/ML
INJECTION INTRAMUSCULAR; INTRAVENOUS
Status: DISPENSED
Start: 2022-05-19

## (undated) RX ORDER — DEXMEDETOMIDINE HYDROCHLORIDE 4 UG/ML
INJECTION, SOLUTION INTRAVENOUS
Status: DISPENSED
Start: 2022-05-19